# Patient Record
Sex: FEMALE | Race: WHITE | NOT HISPANIC OR LATINO | Employment: OTHER | URBAN - METROPOLITAN AREA
[De-identification: names, ages, dates, MRNs, and addresses within clinical notes are randomized per-mention and may not be internally consistent; named-entity substitution may affect disease eponyms.]

---

## 2018-04-03 ENCOUNTER — TELEPHONE (OUTPATIENT)
Dept: BARIATRICS | Facility: CLINIC | Age: 78
End: 2018-04-03

## 2018-04-05 ENCOUNTER — TELEPHONE (OUTPATIENT)
Dept: BARIATRICS | Facility: CLINIC | Age: 78
End: 2018-04-05

## 2018-04-20 DIAGNOSIS — Z12.39 SCREENING BREAST EXAMINATION: Primary | ICD-10-CM

## 2018-05-17 ENCOUNTER — HOSPITAL ENCOUNTER (OUTPATIENT)
Dept: RADIOLOGY | Facility: HOSPITAL | Age: 78
Discharge: HOME | End: 2018-05-17
Attending: OBSTETRICS & GYNECOLOGY
Payer: MEDICARE

## 2018-05-17 DIAGNOSIS — Z12.39 SCREENING BREAST EXAMINATION: ICD-10-CM

## 2018-05-17 PROCEDURE — 77067 SCR MAMMO BI INCL CAD: CPT | Mod: 26,,, | Performed by: RADIOLOGY

## 2018-05-17 PROCEDURE — 77067 SCR MAMMO BI INCL CAD: CPT | Mod: TC

## 2018-05-29 ENCOUNTER — LAB VISIT (OUTPATIENT)
Dept: LAB | Facility: HOSPITAL | Age: 78
End: 2018-05-29
Attending: INTERNAL MEDICINE
Payer: MEDICARE

## 2018-05-29 DIAGNOSIS — E78.5 HYPERLIPIDEMIA: Primary | ICD-10-CM

## 2018-05-29 DIAGNOSIS — R53.83 FATIGUE: ICD-10-CM

## 2018-05-29 DIAGNOSIS — M25.50 JOINT PAIN: ICD-10-CM

## 2018-05-29 DIAGNOSIS — R73.01 FASTING HYPERGLYCEMIA: ICD-10-CM

## 2018-05-29 DIAGNOSIS — Z79.899 DRUG THERAPY: ICD-10-CM

## 2018-05-29 DIAGNOSIS — E03.9 HYPOTHYROIDISM: ICD-10-CM

## 2018-05-29 LAB
ALBUMIN SERPL BCP-MCNC: 4.3 G/DL
ALP SERPL-CCNC: 55 U/L
ALT SERPL W/O P-5'-P-CCNC: 31 U/L
ANION GAP SERPL CALC-SCNC: 11 MMOL/L
AST SERPL-CCNC: 28 U/L
BASOPHILS # BLD AUTO: 0.02 K/UL
BASOPHILS NFR BLD: 0.4 %
BILIRUB SERPL-MCNC: 0.7 MG/DL
BUN SERPL-MCNC: 19 MG/DL
CALCIUM SERPL-MCNC: 9.5 MG/DL
CHLORIDE SERPL-SCNC: 98 MMOL/L
CHOLEST SERPL-MCNC: 147 MG/DL
CHOLEST/HDLC SERPL: 1.9 {RATIO}
CO2 SERPL-SCNC: 27 MMOL/L
CREAT SERPL-MCNC: 0.9 MG/DL
DIFFERENTIAL METHOD: ABNORMAL
EOSINOPHIL # BLD AUTO: 0.2 K/UL
EOSINOPHIL NFR BLD: 3.9 %
ERYTHROCYTE [DISTWIDTH] IN BLOOD BY AUTOMATED COUNT: 12.1 %
EST. GFR  (AFRICAN AMERICAN): >60 ML/MIN/1.73 M^2
EST. GFR  (NON AFRICAN AMERICAN): >60 ML/MIN/1.73 M^2
ESTIMATED AVG GLUCOSE: 108 MG/DL
GLUCOSE SERPL-MCNC: 106 MG/DL
HBA1C MFR BLD HPLC: 5.4 %
HCT VFR BLD AUTO: 40.4 %
HDLC SERPL-MCNC: 77 MG/DL
HDLC SERPL: 52.4 %
HGB BLD-MCNC: 13.7 G/DL
IMM GRANULOCYTES # BLD AUTO: 0.01 K/UL
IMM GRANULOCYTES NFR BLD AUTO: 0.2 %
LDLC SERPL CALC-MCNC: 56.6 MG/DL
LYMPHOCYTES # BLD AUTO: 1.4 K/UL
LYMPHOCYTES NFR BLD: 29.1 %
MCH RBC QN AUTO: 31.2 PG
MCHC RBC AUTO-ENTMCNC: 33.9 G/DL
MCV RBC AUTO: 92 FL
MONOCYTES # BLD AUTO: 0.4 K/UL
MONOCYTES NFR BLD: 8.6 %
NEUTROPHILS # BLD AUTO: 2.8 K/UL
NEUTROPHILS NFR BLD: 57.8 %
NONHDLC SERPL-MCNC: 70 MG/DL
NRBC BLD-RTO: 0 /100 WBC
PLATELET # BLD AUTO: 207 K/UL
PMV BLD AUTO: 10.9 FL
POTASSIUM SERPL-SCNC: 3.2 MMOL/L
PROT SERPL-MCNC: 6.9 G/DL
RBC # BLD AUTO: 4.39 M/UL
SODIUM SERPL-SCNC: 136 MMOL/L
TRIGL SERPL-MCNC: 67 MG/DL
TSH SERPL DL<=0.005 MIU/L-ACNC: 2.43 UIU/ML
WBC # BLD AUTO: 4.88 K/UL

## 2018-05-29 PROCEDURE — 80061 LIPID PANEL: CPT

## 2018-05-29 PROCEDURE — 80053 COMPREHEN METABOLIC PANEL: CPT

## 2018-05-29 PROCEDURE — 83036 HEMOGLOBIN GLYCOSYLATED A1C: CPT

## 2018-05-29 PROCEDURE — 36415 COLL VENOUS BLD VENIPUNCTURE: CPT

## 2018-05-29 PROCEDURE — 84443 ASSAY THYROID STIM HORMONE: CPT

## 2018-05-29 PROCEDURE — 85025 COMPLETE CBC W/AUTO DIFF WBC: CPT

## 2018-12-03 ENCOUNTER — LAB VISIT (OUTPATIENT)
Dept: LAB | Facility: HOSPITAL | Age: 78
End: 2018-12-03
Attending: INTERNAL MEDICINE
Payer: MEDICARE

## 2018-12-03 DIAGNOSIS — E78.5 HYPERLIPIDEMIA: Primary | ICD-10-CM

## 2018-12-03 LAB
CHOLEST SERPL-MCNC: 189 MG/DL
CHOLEST/HDLC SERPL: 2.3 {RATIO}
HDLC SERPL-MCNC: 83 MG/DL
HDLC SERPL: 43.9 %
LDLC SERPL CALC-MCNC: 77.6 MG/DL
NONHDLC SERPL-MCNC: 106 MG/DL
TRIGL SERPL-MCNC: 142 MG/DL

## 2018-12-03 PROCEDURE — 80061 LIPID PANEL: CPT

## 2018-12-03 PROCEDURE — 36415 COLL VENOUS BLD VENIPUNCTURE: CPT

## 2019-04-15 DIAGNOSIS — Z13.820 SCREENING FOR OSTEOPOROSIS: ICD-10-CM

## 2019-04-15 DIAGNOSIS — Z12.39 SCREENING BREAST EXAMINATION: Primary | ICD-10-CM

## 2019-04-15 DIAGNOSIS — M53.3 SACRAL MASS: Primary | ICD-10-CM

## 2019-04-15 DIAGNOSIS — Z78.0 POSTMENOPAUSAL STATUS: ICD-10-CM

## 2019-05-07 DIAGNOSIS — J43.9 EMPHYSEMA/COPD: ICD-10-CM

## 2019-05-07 DIAGNOSIS — R91.8 LUNG NODULES: Primary | ICD-10-CM

## 2019-05-20 ENCOUNTER — HOSPITAL ENCOUNTER (OUTPATIENT)
Dept: RADIOLOGY | Facility: HOSPITAL | Age: 79
Discharge: HOME | End: 2019-05-20
Attending: OBSTETRICS & GYNECOLOGY
Payer: MEDICARE

## 2019-05-20 VITALS — BODY MASS INDEX: 23.21 KG/M2 | WEIGHT: 131 LBS | HEIGHT: 63 IN

## 2019-05-20 DIAGNOSIS — Z12.39 SCREENING BREAST EXAMINATION: ICD-10-CM

## 2019-05-20 DIAGNOSIS — M53.3 SACRAL MASS: ICD-10-CM

## 2019-05-20 DIAGNOSIS — Z78.0 POSTMENOPAUSAL STATUS: ICD-10-CM

## 2019-05-20 DIAGNOSIS — Z13.820 SCREENING FOR OSTEOPOROSIS: ICD-10-CM

## 2019-05-20 DIAGNOSIS — J43.9 EMPHYSEMA/COPD: ICD-10-CM

## 2019-05-20 PROCEDURE — 25500020 PHARM REV CODE 255: Performed by: OBSTETRICS & GYNECOLOGY

## 2019-05-20 PROCEDURE — 71260 CT THORAX DX C+: CPT | Mod: 26,,, | Performed by: RADIOLOGY

## 2019-05-20 PROCEDURE — 77063 BREAST TOMOSYNTHESIS BI: CPT | Mod: 26,,, | Performed by: RADIOLOGY

## 2019-05-20 PROCEDURE — 72197 MRI PELVIS W/O & W/DYE: CPT | Mod: 26,,, | Performed by: RADIOLOGY

## 2019-05-20 PROCEDURE — 77080 DEXA BONE DENSITY SPINE HIP: ICD-10-PCS | Mod: 26,,, | Performed by: RADIOLOGY

## 2019-05-20 PROCEDURE — 77067 SCR MAMMO BI INCL CAD: CPT | Mod: 26,,, | Performed by: RADIOLOGY

## 2019-05-20 PROCEDURE — 77080 DXA BONE DENSITY AXIAL: CPT | Mod: TC

## 2019-05-20 PROCEDURE — 77067 MAMMO DIGITAL SCREENING BILAT WITH TOMOSYNTHESIS_CAD: ICD-10-PCS | Mod: 26,,, | Performed by: RADIOLOGY

## 2019-05-20 PROCEDURE — 72197 MRI PELVIS W/O & W/DYE: CPT | Mod: TC

## 2019-05-20 PROCEDURE — 77067 SCR MAMMO BI INCL CAD: CPT | Mod: TC

## 2019-05-20 PROCEDURE — A9585 GADOBUTROL INJECTION: HCPCS | Performed by: OBSTETRICS & GYNECOLOGY

## 2019-05-20 PROCEDURE — 72197 MRI PELVIS W WO CONTRAST: ICD-10-PCS | Mod: 26,,, | Performed by: RADIOLOGY

## 2019-05-20 PROCEDURE — 71260 CT THORAX DX C+: CPT | Mod: TC

## 2019-05-20 PROCEDURE — 77063 MAMMO DIGITAL SCREENING BILAT WITH TOMOSYNTHESIS_CAD: ICD-10-PCS | Mod: 26,,, | Performed by: RADIOLOGY

## 2019-05-20 PROCEDURE — 71260 CT CHEST WITH CONTRAST: ICD-10-PCS | Mod: 26,,, | Performed by: RADIOLOGY

## 2019-05-20 PROCEDURE — 77080 DXA BONE DENSITY AXIAL: CPT | Mod: 26,,, | Performed by: RADIOLOGY

## 2019-05-20 RX ORDER — GADOBUTROL 604.72 MG/ML
6 INJECTION INTRAVENOUS
Status: COMPLETED | OUTPATIENT
Start: 2019-05-20 | End: 2019-05-20

## 2019-05-20 RX ADMIN — GADOBUTROL 6 ML: 604.72 INJECTION INTRAVENOUS at 03:05

## 2019-05-20 RX ADMIN — IOHEXOL 75 ML: 350 INJECTION, SOLUTION INTRAVENOUS at 01:05

## 2019-05-22 DIAGNOSIS — E04.1 LEFT THYROID NODULE: Primary | ICD-10-CM

## 2019-05-23 ENCOUNTER — HOSPITAL ENCOUNTER (OUTPATIENT)
Dept: RADIOLOGY | Facility: HOSPITAL | Age: 79
Discharge: HOME | End: 2019-05-23
Attending: OBSTETRICS & GYNECOLOGY
Payer: MEDICARE

## 2019-05-23 DIAGNOSIS — E04.1 LEFT THYROID NODULE: ICD-10-CM

## 2019-05-23 PROCEDURE — 76536 US EXAM OF HEAD AND NECK: CPT | Mod: TC,PN

## 2019-05-23 PROCEDURE — 76536 US THYROID: ICD-10-PCS | Mod: 26,,, | Performed by: RADIOLOGY

## 2019-05-23 PROCEDURE — 76536 US EXAM OF HEAD AND NECK: CPT | Mod: 26,,, | Performed by: RADIOLOGY

## 2019-06-05 ENCOUNTER — TELEPHONE (OUTPATIENT)
Dept: HEMATOLOGY/ONCOLOGY | Facility: CLINIC | Age: 79
End: 2019-06-05

## 2019-06-05 NOTE — TELEPHONE ENCOUNTER
Message left instructing patient to call [phone] to schedule referral appointment on both lines,

## 2019-06-06 ENCOUNTER — TELEPHONE (OUTPATIENT)
Dept: HEMATOLOGY/ONCOLOGY | Facility: CLINIC | Age: 79
End: 2019-06-06

## 2019-06-06 NOTE — TELEPHONE ENCOUNTER
Message left instructing patient to call [phone] to schedule referral appointment for Multiple  Myeloma. (referral in media 6/3)

## 2019-06-12 ENCOUNTER — TELEPHONE (OUTPATIENT)
Dept: INFUSION THERAPY | Facility: HOSPITAL | Age: 79
End: 2019-06-12

## 2019-06-12 ENCOUNTER — TELEPHONE (OUTPATIENT)
Dept: HEMATOLOGY/ONCOLOGY | Facility: CLINIC | Age: 79
End: 2019-06-12

## 2019-06-12 NOTE — TELEPHONE ENCOUNTER
Spoke with patient and she is out of stated until Sunday and will call me Monday to schedule appointment.

## 2019-06-20 ENCOUNTER — LAB VISIT (OUTPATIENT)
Dept: LAB | Facility: HOSPITAL | Age: 79
End: 2019-06-20
Attending: SURGERY
Payer: MEDICARE

## 2019-06-20 ENCOUNTER — TELEPHONE (OUTPATIENT)
Dept: HEMATOLOGY/ONCOLOGY | Facility: CLINIC | Age: 79
End: 2019-06-20

## 2019-06-20 ENCOUNTER — OFFICE VISIT (OUTPATIENT)
Dept: SURGERY | Facility: CLINIC | Age: 79
End: 2019-06-20
Payer: MEDICARE

## 2019-06-20 VITALS
SYSTOLIC BLOOD PRESSURE: 123 MMHG | HEART RATE: 73 BPM | OXYGEN SATURATION: 97 % | HEIGHT: 63 IN | DIASTOLIC BLOOD PRESSURE: 71 MMHG | RESPIRATION RATE: 18 BRPM | TEMPERATURE: 98 F | BODY MASS INDEX: 24.77 KG/M2 | WEIGHT: 139.81 LBS

## 2019-06-20 DIAGNOSIS — E04.2 MULTIPLE THYROID NODULES: Primary | ICD-10-CM

## 2019-06-20 DIAGNOSIS — E04.2 MULTIPLE THYROID NODULES: ICD-10-CM

## 2019-06-20 LAB
T4 FREE SERPL-MCNC: 0.92 NG/DL (ref 0.71–1.51)
TSH SERPL DL<=0.005 MIU/L-ACNC: 1.88 UIU/ML (ref 0.34–5.6)

## 2019-06-20 PROCEDURE — 84481 FREE ASSAY (FT-3): CPT

## 2019-06-20 PROCEDURE — 99205 OFFICE O/P NEW HI 60 MIN: CPT | Mod: S$GLB,,, | Performed by: SURGERY

## 2019-06-20 PROCEDURE — 99205 PR OFFICE/OUTPT VISIT, NEW, LEVL V, 60-74 MIN: ICD-10-PCS | Mod: S$GLB,,, | Performed by: SURGERY

## 2019-06-20 PROCEDURE — 84439 ASSAY OF FREE THYROXINE: CPT

## 2019-06-20 PROCEDURE — 36415 COLL VENOUS BLD VENIPUNCTURE: CPT

## 2019-06-20 PROCEDURE — 84443 ASSAY THYROID STIM HORMONE: CPT

## 2019-06-20 RX ORDER — TRIAMTERENE AND HYDROCHLOROTHIAZIDE 75; 50 MG/1; MG/1
TABLET ORAL
COMMUNITY
Start: 2019-05-24 | End: 2019-06-20 | Stop reason: SDUPTHER

## 2019-06-20 RX ORDER — VALACYCLOVIR HYDROCHLORIDE 1 G/1
TABLET, FILM COATED ORAL
COMMUNITY
Start: 2019-04-22 | End: 2020-04-21

## 2019-06-20 RX ORDER — ROSUVASTATIN CALCIUM 20 MG/1
TABLET, COATED ORAL
COMMUNITY
End: 2021-08-02 | Stop reason: SDUPTHER

## 2019-06-20 RX ORDER — ESOMEPRAZOLE MAGNESIUM 40 MG/1
CAPSULE, DELAYED RELEASE ORAL
COMMUNITY
End: 2020-07-29

## 2019-06-20 NOTE — PROGRESS NOTES
Subjective:       Patient ID: Liliana Hermosillo is a 78 y.o. female.    Chief Complaint: Consult (Referral-Alondra-Thyroid Nodule)      HPI:  Ms. Hermosillo presents today as a consult from Dr. Cantu for an abnormal thyroid ultrasound.  She recently had a CT scan of her chest for surveillance of a pulmonary nodule.  CT scan detected a nodule in the left lobe of her thyroid.  This precipitated a thyroid ultrasound.  Thyroid ultrasound has confirmed to nodules in the left lobe and 1 nodule in the right lobe.  She is not experiencing any symptoms of hyperthyroidism or hypothyroidism.  She is not experiencing any compressive symptoms.    Thyroid ultrasound films and report reviewed from 5/23/2019.  A 2 mm hypoechoic nodule was found in the right lobe of the thyroid.  A 1.6 cm nodule and a 1.3 cm nodule were found in the left lobe.      Allergies & Meds:  Review of patient's allergies indicates:  No Known Allergies    Current Outpatient Medications   Medication Sig Dispense Refill    esomeprazole (NEXIUM) 40 MG capsule Nexium 40 mg capsule,delayed release   Take 0.5 capsules every day by oral route.      rosuvastatin (CRESTOR) 20 MG tablet rosuvastatin 20 mg tablet   Take 1 tablet every day by oral route.      triamterene-hydrochlorothiazide 37.5-25 mg (MAXZIDE-25) 37.5-25 mg per tablet Take 1 tablet by mouth once daily.      valACYclovir (VALTREX) 1000 MG tablet        No current facility-administered medications for this visit.        PMFSHx:  Past Medical History:   Diagnosis Date    Atypical ductal hyperplasia, breast     Edema     Hypertension     Ovarian cyst        Past Surgical History:   Procedure Laterality Date    ANKLE ARTHROSCOPY W/ OPEN REPAIR      BREAST BIOPSY Left     CHOLECYSTECTOMY      HYSTERECTOMY      KNEE SURGERY      OPEN REDUCTION INTERNAL FIXATION-HIP Right 7/2/2014    Performed by [facility]ites [facility]       Family History   Problem Relation Age of Onset    Colon  cancer Mother     Anuerysm Father     Breast cancer Sister     Breast cancer Maternal Aunt     Collagen disease Neg Hx     Ovarian cancer Neg Hx        Social History     Tobacco Use    Smoking status: Never Smoker    Smokeless tobacco: Never Used   Substance Use Topics    Alcohol use: Yes     Alcohol/week: 1.2 oz     Types: 2 Glasses of wine per week    Drug use: No       Review of Systems   Constitutional: Negative for appetite change, chills, fatigue, fever and unexpected weight change.   HENT: Negative for congestion, dental problem, ear pain, mouth sores, postnasal drip, rhinorrhea, sore throat, tinnitus, trouble swallowing and voice change.    Eyes: Negative for photophobia, pain, discharge and visual disturbance.   Respiratory: Negative for cough, chest tightness, shortness of breath and wheezing.    Cardiovascular: Negative for chest pain, palpitations and leg swelling.   Gastrointestinal: Negative for abdominal pain, blood in stool, constipation, diarrhea, nausea and vomiting.   Endocrine: Negative for cold intolerance, heat intolerance, polydipsia, polyphagia and polyuria.   Genitourinary: Negative for difficulty urinating, dysuria, flank pain, frequency, hematuria and urgency.   Musculoskeletal: Negative for arthralgias, joint swelling and myalgias.   Skin: Negative for color change and rash.   Allergic/Immunologic: Negative for immunocompromised state.   Neurological: Negative for dizziness, tremors, seizures, syncope, speech difficulty, weakness, numbness and headaches.   Hematological: Negative for adenopathy. Does not bruise/bleed easily.   Psychiatric/Behavioral: Negative for agitation, confusion, hallucinations, self-injury and suicidal ideas. The patient is not nervous/anxious.        Objective:      Physical Exam   Constitutional: She is oriented to person, place, and time. She appears well-developed and well-nourished. She is active.  Non-toxic appearance. No distress.   Body mass index  is 24.76 kg/m².     HENT:   Head: Normocephalic and atraumatic.   Right Ear: Hearing and external ear normal. No drainage or tenderness.   Left Ear: Hearing and external ear normal. No drainage or tenderness.   Nose: Nose normal. No rhinorrhea. No epistaxis.   Mouth/Throat: Uvula is midline, oropharynx is clear and moist and mucous membranes are normal. Mucous membranes are not pale, not dry and not cyanotic. No oropharyngeal exudate.   Eyes: Pupils are equal, round, and reactive to light. Conjunctivae and lids are normal. Right eye exhibits no discharge and no exudate. Left eye exhibits no discharge and no exudate. Right conjunctiva is not injected. Right eye exhibits no nystagmus. Left eye exhibits no nystagmus.   Neck: Trachea normal, full passive range of motion without pain and phonation normal. No JVD present. Carotid bruit is not present. No tracheal deviation present. No thyroid mass and no thyromegaly present.   Cardiovascular: Normal rate, regular rhythm, S1 normal, S2 normal, normal heart sounds and intact distal pulses. PMI is not displaced. Exam reveals no gallop and no friction rub.   No murmur heard.  Pulmonary/Chest: Effort normal and breath sounds normal. No accessory muscle usage. No respiratory distress. She exhibits no mass, no tenderness and no crepitus. Right breast exhibits no inverted nipple, no mass, no nipple discharge, no skin change and no tenderness. Left breast exhibits no inverted nipple, no mass, no nipple discharge, no skin change and no tenderness. Breasts are symmetrical.   Abdominal: Soft. Normal appearance and bowel sounds are normal. She exhibits no distension, no fluid wave, no abdominal bruit and no mass. There is no hepatosplenomegaly. There is no tenderness. There is no rebound, no guarding and negative Gomes's sign. No hernia.   Musculoskeletal:        Cervical back: Normal.        Thoracic back: Normal.        Lumbar back: Normal.        Right upper arm: Normal.         Left upper arm: Normal.        Right forearm: Normal.        Left forearm: Normal.        Right hand: Normal.        Left hand: Normal.        Right upper leg: Normal.        Left upper leg: Normal.        Right lower leg: Normal.        Left lower leg: Normal.        Right foot: Normal.        Left foot: Normal.   Lymphadenopathy:        Head (right side): No submental, no submandibular and no posterior auricular adenopathy present.        Head (left side): No submental, no submandibular and no posterior auricular adenopathy present.     She has no cervical adenopathy.     She has no axillary adenopathy.        Right: No inguinal and no supraclavicular adenopathy present.        Left: No inguinal and no supraclavicular adenopathy present.   Neurological: She is alert and oriented to person, place, and time. She has normal strength. No cranial nerve deficit or sensory deficit. Coordination and gait normal. GCS eye subscore is 4. GCS verbal subscore is 5. GCS motor subscore is 6.   Skin: Skin is warm, dry and intact. No rash noted. No cyanosis. Nails show no clubbing.   Psychiatric: She has a normal mood and affect. Her speech is normal. Judgment and thought content normal. Her mood appears not anxious. Her affect is not inappropriate. She is not actively hallucinating. She does not exhibit a depressed mood.         Test Results:  See above    Assessment:       Nontoxic multinodular goiter.  Options at this time include were not limited to nuclear medicine thyroid scan, ultrasound-guided FNA biopsy, second opinion, observation with repeat thyroid ultrasound in November, thyroidectomy.  She is due for TFTs.    1. Multiple thyroid nodules        Plan:   Multiple thyroid nodules  -     TSH; Future; Expected date: 06/20/2019  -     T3, free; Future; Expected date: 06/20/2019  -     T4, free; Future; Expected date: 06/20/2019  -     US Soft Tissue Head Neck Thyroid; Future; Expected date: 11/18/2019        Follow up in  about 5 months (around 11/20/2019) for results.    Counseling/Medical Decision Making:  Ms. Hermosillo was counseled regarding the results of her evaluation thus far, differential diagnosis, and therapeutic options.  Diagnoses discussed included not limited to multinodular goiter, adenomas, follicular carcinoma, papillary carcinoma, anaplastic carcinoma, etc.  Options discussed included were not limited to those outlined above. Risks and benefits of surgery were discussed in detail.  Risks discussed included were not limited to recurrent laryngeal nerve injury, incomplete thyroidectomy, parathyroid injury, etc.  Possible complications of observation with repeat ultrasound discussed included not limited to delay in diagnosis of a malignant thyroid tumor, loss of opportunity to care a thyroid malignancy, need for more radical/aggressive surgery and treatment, etc.  Possible risks of FNA biopsy discussed included not limited to pain, bleeding, infections, false-negative biopsy, etc.  Questions were solicited and answered.  Entire conversation was held in layman's terms.  At the conclusion of the conversation she voiced complete understanding of all we discussed, satisfaction all questions were answered, and stated that she felt fully informed completely capable of making an informed decision.  She requested to proceed with observation and repeat thyroid ultrasound in November.    Total face-to-face encounter time was 60 minutes, 40 minutes spent counseling as outlined/summarized above.

## 2019-06-20 NOTE — LETTER
June 20, 2019      Claudio Cantu MD  [address] [facility] [place] [address]           [facility] - [facility]  [address] [place] [address]  Phone: [phone]  Fax: [phone]          Patient: Liliana Hermosillo   MR Number: [id number]   YOB: 1940   Date of Visit: 6/20/2019       Dear Dr. Claudio Cantu:    Thank you for referring Liliana Hermosillo to me for evaluation. Attached you will find relevant portions of my assessment and plan of care.    If you have questions, please do not hesitate to call me. I look forward to following Liliana Hermosillo along with you.    Sincerely,    Maurice Lake MD    Enclosure  CC:  No Recipients    If you would like to receive this communication electronically, please contact [email or web address] or [phone] to request more information on [employer] Link access.    For providers and/or their staff who would like to refer a patient to [facility], please contact us through our one-stop-shop provider referral line, [facility], at [phone].    If you feel you have received this communication in error or would no longer like to receive these types of communications, please e-mail [email or web address]

## 2019-06-21 LAB — T3FREE SERPL-MCNC: 2.4 PG/ML (ref 2.3–4.2)

## 2019-06-24 ENCOUNTER — TELEPHONE (OUTPATIENT)
Dept: HEMATOLOGY/ONCOLOGY | Facility: CLINIC | Age: 79
End: 2019-06-24

## 2019-06-24 NOTE — TELEPHONE ENCOUNTER
----- Message from Tavo PASCUAL Tima sent at 6/24/2019  1:06 PM CDT -----  Contact: same  Patient called in and stated she wants Dr. Tucker to give her a test to see if she has Multiple Myloma.   Patient stated she missed a call last week from office.  Call back number is [phone]

## 2019-06-24 NOTE — TELEPHONE ENCOUNTER
Spoke to pt to schedule referral apt as requested. Pt confirmed apt date and time scheduled and verbalized understanding.

## 2019-07-05 ENCOUNTER — TELEPHONE (OUTPATIENT)
Dept: SURGERY | Facility: CLINIC | Age: 79
End: 2019-07-05

## 2019-07-05 NOTE — TELEPHONE ENCOUNTER
----- Message from Maurice Lake MD sent at 6/27/2019  5:49 PM CDT -----  Contact: [phone]    Please call the patient and tell her that her lab results are normal.  Also please let her know that she can check all her results on the computer through her MyChart    ----- Message -----  From: Ilene Elena LPN  Sent: 6/27/2019   2:52 PM  To: MD Dr Jaya Wolfe  ----- Message -----  From: Roseline Hinojosa  Sent: 6/27/2019  11:38 AM  To: Jaya Richards Staff    Patient is calling for lab results. Please call patient at [phone]. Thanks!

## 2019-08-14 ENCOUNTER — INITIAL CONSULT (OUTPATIENT)
Dept: HEMATOLOGY/ONCOLOGY | Facility: CLINIC | Age: 79
End: 2019-08-14
Payer: MEDICARE

## 2019-08-14 VITALS
WEIGHT: 136 LBS | RESPIRATION RATE: 20 BRPM | OXYGEN SATURATION: 99 % | BODY MASS INDEX: 24.1 KG/M2 | SYSTOLIC BLOOD PRESSURE: 154 MMHG | HEIGHT: 63 IN | HEART RATE: 72 BPM | TEMPERATURE: 98 F | DIASTOLIC BLOOD PRESSURE: 69 MMHG

## 2019-08-14 DIAGNOSIS — Z80.0 FAMILY HISTORY OF COLON CANCER: ICD-10-CM

## 2019-08-14 DIAGNOSIS — Z80.1 FAMILY HISTORY OF LUNG CANCER: ICD-10-CM

## 2019-08-14 DIAGNOSIS — T14.8XXA BROKEN BONES: Primary | ICD-10-CM

## 2019-08-14 PROCEDURE — 99213 OFFICE O/P EST LOW 20 MIN: CPT | Mod: PBBFAC | Performed by: INTERNAL MEDICINE

## 2019-08-14 PROCEDURE — 99999 PR PBB SHADOW E&M-EST. PATIENT-LVL III: ICD-10-PCS | Mod: PBBFAC,,, | Performed by: INTERNAL MEDICINE

## 2019-08-14 PROCEDURE — 99205 PR OFFICE/OUTPT VISIT, NEW, LEVL V, 60-74 MIN: ICD-10-PCS | Mod: S$PBB,,, | Performed by: INTERNAL MEDICINE

## 2019-08-14 PROCEDURE — 99205 OFFICE O/P NEW HI 60 MIN: CPT | Mod: S$PBB,,, | Performed by: INTERNAL MEDICINE

## 2019-08-14 PROCEDURE — 99999 PR PBB SHADOW E&M-EST. PATIENT-LVL III: CPT | Mod: PBBFAC,,, | Performed by: INTERNAL MEDICINE

## 2019-08-14 RX ORDER — TRIAMTERENE AND HYDROCHLOROTHIAZIDE 75; 50 MG/1; MG/1
TABLET ORAL
COMMUNITY
Start: 2019-07-22 | End: 2020-10-19 | Stop reason: SDUPTHER

## 2019-08-14 NOTE — LETTER
August 14, 2019      Claudio Cantu MD  [address] [address] [facility] [place] [address]           [facility] - Hematology Oncology  [address] [place] [address]  Phone: [phone]          Patient: Liliana Hermosillo   MR Number: [id number]   YOB: 1940   Date of Visit: 8/14/2019       Dear Dr. Claudio Cantu:    Thank you for referring Liliana Hermosillo to me for evaluation. Attached you will find relevant portions of my assessment and plan of care.    If you have questions, please do not hesitate to call me. I look forward to following Liliana Hermosillo along with you.    Sincerely,    Neha Tucker MD    Enclosure  CC:  No Recipients    If you would like to receive this communication electronically, please contact [email or web address] or [phone] to request more information on [employer] Link access.    For providers and/or their staff who would like to refer a patient to [facility], please contact us through our one-stop-shop provider referral line, [facility], at [phone].    If you feel you have received this communication in error or would no longer like to receive these types of communications, please e-mail [email or web address]

## 2019-08-14 NOTE — PROGRESS NOTES
CC I had a compression fracture in my back and I want to rule out myeloma    Liliana Hermosillo is a 79 y.o.    This is a 79 yr old female who was found to have a compression fracture in T7. She had a broken ankle as a child , a broken hip and a broken leg in the past. Last dexa was May 20 2019 revealing osteopenia. She is being monitored for a 1.3 cm thyroid nodule. Pt was offered prolia and pagankevin ornelasot want to take this medicine . She is on algae nichole    Past Medical History:   Diagnosis Date    Atypical ductal hyperplasia, breast     Edema     Hypertension     Ovarian cyst      Past Surgical History:   Procedure Laterality Date    ANKLE ARTHROSCOPY W/ OPEN REPAIR      BREAST BIOPSY Left     CHOLECYSTECTOMY      HYSTERECTOMY      KNEE SURGERY      OPEN REDUCTION INTERNAL FIXATION-HIP Right 7/2/2014    Performed by [facility]ites [facility]       Current Outpatient Medications:     esomeprazole (NEXIUM) 40 MG capsule, Nexium 40 mg capsule,delayed release  Take 0.5 capsules every day by oral route., Disp: , Rfl:     rosuvastatin (CRESTOR) 20 MG tablet, rosuvastatin 20 mg tablet  Take 1 tablet every day by oral route., Disp: , Rfl:     triamterene-hydrochlorothiazide 37.5-25 mg (MAXZIDE-25) 37.5-25 mg per tablet, Take 1 tablet by mouth once daily., Disp: , Rfl:     valACYclovir (VALTREX) 1000 MG tablet, , Disp: , Rfl:   Review of patient's allergies indicates:  No Known Allergies  Social History     Tobacco Use    Smoking status: Never Smoker    Smokeless tobacco: Never Used   Substance Use Topics    Alcohol use: Yes     Alcohol/week: 1.2 oz     Types: 2 Glasses of wine per week    Drug use: No     Family History   Problem Relation Age of Onset    Colon cancer Mother     Anuerysm Father     Breast cancer Sister     Breast cancer Maternal Aunt     Collagen disease Neg Hx     Ovarian cancer Neg Hx        CONSTITUTIONAL: No fevers, chills, night sweats, wt. loss, appetite changes  SKIN: no rashes  "or itching  ENT: No headaches, head trauma, vision changes, or eye pain  LYMPH NODES: None noticed   CV: No chest pain, palpitations.   RESP: No dyspnea on exertion, cough, wheezing, or hemoptysis  GI: No nausea, emesis, diarrhea, constipation, melena, hematochezia, pain.   : No dysuria, hematuria, urgency, or frequency   HEME: No easy bruising, bleeding problems  PSYCHIATRIC: No depression, anxiety, psychosis, hallucinations.  NEURO: No seizures, memory loss, dizziness or difficulty sleeping  MSK: No arthralgias or joint swelling         BP (!) 154/69   Pulse 72   Temp 98.1 °F (36.7 °C) (Oral)   Resp 20   Ht 5' 3" (1.6 m)   Wt 61.7 kg (136 lb)   SpO2 99%   BMI 24.09 kg/m²   Gen: NAD, A and O x3,   Psych: pleasant affect, normal thought process  Eyes: Pupils round and non dilated, EOM intact  Nose: Nares patent  OP clear, mucosa patent  Neck: suppple, no JVD, trachea midline, no palpable mass, no adenopathy  Lungs: CTAB, no wheezes, no use of accessory muscles  CV: S1S2 with RRR, No mrg  Abd: soft, NTND, + BS, No HSM, no ascites  Extr: No CCE, DANIELLE, strength normal, good capillary refill  Neuro: steady gait, CNs grossly intact  Skin: intact, no lesions noted  Rheum: No joint swelling    Lab Results   Component Value Date    WBC 4.88 05/29/2018    HGB 13.7 05/29/2018    HCT 40.4 05/29/2018    MCV 92 05/29/2018     05/29/2018     CMP  Sodium   Date Value Ref Range Status   05/29/2018 136 136 - 145 mmol/L Final     Potassium   Date Value Ref Range Status   05/29/2018 3.2 (L) 3.5 - 5.1 mmol/L Final     Chloride   Date Value Ref Range Status   05/29/2018 98 95 - 110 mmol/L Final     CO2   Date Value Ref Range Status   05/29/2018 27 23 - 29 mmol/L Final     Glucose   Date Value Ref Range Status   05/29/2018 106 70 - 110 mg/dL Final     BUN, Bld   Date Value Ref Range Status   05/20/2019 28 (H) 8 - 23 mg/dL Final     Creatinine   Date Value Ref Range Status   05/20/2019 1.1 0.5 - 1.4 mg/dL Final     Calcium "   Date Value Ref Range Status   05/29/2018 9.5 8.7 - 10.5 mg/dL Final     Total Protein   Date Value Ref Range Status   05/29/2018 6.9 6.0 - 8.4 g/dL Final     Albumin   Date Value Ref Range Status   05/29/2018 4.3 3.5 - 5.2 g/dL Final     Total Bilirubin   Date Value Ref Range Status   05/29/2018 0.7 0.1 - 1.0 mg/dL Final     Comment:     For infants and newborns, interpretation of results should be based  on gestational age, weight and in agreement with clinical  observations.  Premature Infant recommended reference ranges:  Up to 24 hours.............<8.0 mg/dL  Up to 48 hours............<12.0 mg/dL  3-5 days..................<15.0 mg/dL  6-29 days.................<15.0 mg/dL       Alkaline Phosphatase   Date Value Ref Range Status   05/29/2018 55 55 - 135 U/L Final     AST   Date Value Ref Range Status   05/29/2018 28 10 - 40 U/L Final     ALT   Date Value Ref Range Status   05/29/2018 31 10 - 44 U/L Final     Anion Gap   Date Value Ref Range Status   05/29/2018 11 8 - 16 mmol/L Final     eGFR if    Date Value Ref Range Status   05/20/2019 56.4 (A) >60 mL/min/1.73 m^2 Final     eGFR if non    Date Value Ref Range Status   05/20/2019 48.9 (A) >60 mL/min/1.73 m^2 Final     Comment:     Calculation used to obtain the estimated glomerular filtration  rate (eGFR) is the CKD-EPI equation.          Broken bones  -     Protein electrophoresis, serum; Future; Expected date: 08/14/2019  -     Immunofixation electrophoresis; Future; Expected date: 08/14/2019  -     Beta 2 Microglobulin, Serum; Future; Expected date: 08/14/2019  -     Lactate dehydrogenase; Future; Expected date: 08/14/2019  -     Immunoglobulins (IgG, IgA, IgM) Quantitative; Future; Expected date: 08/14/2019  -     FREE LT CHAIN ANAL; Future; Expected date: 08/14/2019  -     Viscosity, serum; Future; Expected date: 08/14/2019  -     X-Ray Metastic Survey; Future; Expected date: 08/14/2019  -     X-Ray Skull Complete Min 4  Views; Future; Expected date: 08/14/2019    Family history of colon cancer    Family history of lung cancer      workup in place for myeloma  Start K 2 for bone health   RTC 2 weeks   Thank you for allowing me to evaluate and participate in the care of this pleasant patient. Please fell free to call me with any questions or concerns.    Neha Bhakta MD    This note was dictated with Dragon and briefly proofread. Please excuse any sentences that may be unclear or words misspelled

## 2019-08-15 ENCOUNTER — HOSPITAL ENCOUNTER (OUTPATIENT)
Dept: RADIOLOGY | Facility: HOSPITAL | Age: 79
Discharge: HOME | End: 2019-08-15
Attending: INTERNAL MEDICINE
Payer: MEDICARE

## 2019-08-15 DIAGNOSIS — T14.8XXA BROKEN BONES: ICD-10-CM

## 2019-08-15 PROCEDURE — 77075 XR METASTATIC SURVEY: ICD-10-PCS | Mod: 26,,, | Performed by: RADIOLOGY

## 2019-08-15 PROCEDURE — 77075 RADEX OSSEOUS SURVEY COMPL: CPT | Mod: 26,,, | Performed by: RADIOLOGY

## 2019-08-15 PROCEDURE — 70260 XR SKULL COMPLETE MIN 4 VIEWS: ICD-10-PCS | Mod: 26,59,, | Performed by: RADIOLOGY

## 2019-08-15 PROCEDURE — 77075 RADEX OSSEOUS SURVEY COMPL: CPT | Mod: TC,PN

## 2019-08-15 PROCEDURE — 70260 X-RAY EXAM OF SKULL: CPT | Mod: 26,59,, | Performed by: RADIOLOGY

## 2019-08-15 PROCEDURE — 70260 X-RAY EXAM OF SKULL: CPT | Mod: TC,PN

## 2019-08-23 ENCOUNTER — LAB VISIT (OUTPATIENT)
Dept: LAB | Facility: HOSPITAL | Age: 79
End: 2019-08-23
Attending: INTERNAL MEDICINE
Payer: MEDICARE

## 2019-08-23 DIAGNOSIS — T14.8XXA BROKEN BONES: ICD-10-CM

## 2019-08-23 LAB
IGA SERPL-MCNC: 167 MG/DL (ref 40–350)
IGG SERPL-MCNC: 687 MG/DL (ref 650–1600)
IGM SERPL-MCNC: 72 MG/DL (ref 50–300)
LDH SERPL L TO P-CCNC: 149 U/L (ref 110–260)

## 2019-08-23 PROCEDURE — 85810 BLOOD VISCOSITY EXAMINATION: CPT

## 2019-08-23 PROCEDURE — 84165 PROTEIN E-PHORESIS SERUM: CPT | Mod: 26,,, | Performed by: PATHOLOGY

## 2019-08-23 PROCEDURE — 36415 COLL VENOUS BLD VENIPUNCTURE: CPT

## 2019-08-23 PROCEDURE — 86334 PATHOLOGIST INTERPRETATION IFE: ICD-10-PCS | Mod: 26,,, | Performed by: PATHOLOGY

## 2019-08-23 PROCEDURE — 83520 IMMUNOASSAY QUANT NOS NONAB: CPT

## 2019-08-23 PROCEDURE — 82232 ASSAY OF BETA-2 PROTEIN: CPT

## 2019-08-23 PROCEDURE — 82784 ASSAY IGA/IGD/IGG/IGM EACH: CPT | Mod: 59

## 2019-08-23 PROCEDURE — 84165 PROTEIN E-PHORESIS SERUM: CPT

## 2019-08-23 PROCEDURE — 86334 IMMUNOFIX E-PHORESIS SERUM: CPT

## 2019-08-23 PROCEDURE — 83615 LACTATE (LD) (LDH) ENZYME: CPT

## 2019-08-23 PROCEDURE — 86334 IMMUNOFIX E-PHORESIS SERUM: CPT | Mod: 26,,, | Performed by: PATHOLOGY

## 2019-08-23 PROCEDURE — 84165 PATHOLOGIST INTERPRETATION SPE: ICD-10-PCS | Mod: 26,,, | Performed by: PATHOLOGY

## 2019-08-24 LAB — B2 MICROGLOB SERPL-MCNC: 2.3 UG/ML (ref 0–2.5)

## 2019-08-26 LAB
ALBUMIN SERPL ELPH-MCNC: 4.68 G/DL (ref 3.35–5.55)
ALPHA1 GLOB SERPL ELPH-MCNC: 0.3 G/DL (ref 0.17–0.41)
ALPHA2 GLOB SERPL ELPH-MCNC: 0.72 G/DL (ref 0.43–0.99)
B-GLOBULIN SERPL ELPH-MCNC: 0.8 G/DL (ref 0.5–1.1)
GAMMA GLOB SERPL ELPH-MCNC: 0.7 G/DL (ref 0.67–1.58)
INTERPRETATION SERPL IFE-IMP: NORMAL
KAPPA LC SER QL IA: 1.32 MG/DL (ref 0.33–1.94)
KAPPA LC/LAMBDA SER IA: 1.13 (ref 0.26–1.65)
LAMBDA LC SER QL IA: 1.17 MG/DL (ref 0.57–2.63)
PATHOLOGIST INTERPRETATION IFE: NORMAL
PATHOLOGIST INTERPRETATION SPE: NORMAL
PROT SERPL-MCNC: 7.2 G/DL (ref 6–8.4)

## 2019-08-27 LAB — VISC SER: 1.27 CP (ref 1.1–1.8)

## 2019-08-28 ENCOUNTER — OFFICE VISIT (OUTPATIENT)
Dept: HEMATOLOGY/ONCOLOGY | Facility: CLINIC | Age: 79
End: 2019-08-28
Payer: MEDICARE

## 2019-08-28 VITALS
OXYGEN SATURATION: 98 % | DIASTOLIC BLOOD PRESSURE: 65 MMHG | HEART RATE: 68 BPM | RESPIRATION RATE: 16 BRPM | HEIGHT: 63 IN | SYSTOLIC BLOOD PRESSURE: 136 MMHG | WEIGHT: 135 LBS | BODY MASS INDEX: 23.92 KG/M2

## 2019-08-28 DIAGNOSIS — M48.50XA VERTEBRAL COMPRESSION FRACTURE: ICD-10-CM

## 2019-08-28 DIAGNOSIS — M85.80 OSTEOPENIA, UNSPECIFIED LOCATION: ICD-10-CM

## 2019-08-28 PROCEDURE — 99213 OFFICE O/P EST LOW 20 MIN: CPT | Mod: PBBFAC | Performed by: INTERNAL MEDICINE

## 2019-08-28 PROCEDURE — 99999 PR PBB SHADOW E&M-EST. PATIENT-LVL III: ICD-10-PCS | Mod: PBBFAC,,, | Performed by: INTERNAL MEDICINE

## 2019-08-28 PROCEDURE — 99214 PR OFFICE/OUTPT VISIT, EST, LEVL IV, 30-39 MIN: ICD-10-PCS | Mod: S$PBB,,, | Performed by: INTERNAL MEDICINE

## 2019-08-28 PROCEDURE — 99214 OFFICE O/P EST MOD 30 MIN: CPT | Mod: S$PBB,,, | Performed by: INTERNAL MEDICINE

## 2019-08-28 PROCEDURE — 99999 PR PBB SHADOW E&M-EST. PATIENT-LVL III: CPT | Mod: PBBFAC,,, | Performed by: INTERNAL MEDICINE

## 2019-08-28 NOTE — PROGRESS NOTES
CC I had a compression fracture in my back     Liliana Hermosillo is a 79 y.o.    This is a 79 yr old female who was found to have a compression fracture in T7. She had a broken ankle as a child , a broken hip and a broken leg in the past. Last dexa was May 20 2019 revealing osteopenia. She is being monitored for a 1.3 cm thyroid nodule. Pt was offered prolia and ej ornelasot want to take this medicine . She is on algae nichole    Here for results pertaining to myeloma   Negative workup   Dexa reveal osteopenia   Past Medical History:   Diagnosis Date    Atypical ductal hyperplasia, breast     Edema     Hypertension     Ovarian cyst      Past Surgical History:   Procedure Laterality Date    ANKLE ARTHROSCOPY W/ OPEN REPAIR      BREAST BIOPSY Left     CHOLECYSTECTOMY      HYSTERECTOMY      KNEE SURGERY      OPEN REDUCTION INTERNAL FIXATION-HIP Right 7/2/2014    Performed by Morteza Benites MD at [facility] OR       Current Outpatient Medications:     esomeprazole (NEXIUM) 40 MG capsule, Nexium 40 mg capsule,delayed release  Take 0.5 capsules every day by oral route., Disp: , Rfl:     rosuvastatin (CRESTOR) 20 MG tablet, rosuvastatin 20 mg tablet  Take 1 tablet twicw weekly by oral route., Disp: , Rfl:     triamterene-hydrochlorothiazide 75-50 mg (MAXZIDE) 75-50 mg per tablet, , Disp: , Rfl:     valACYclovir (VALTREX) 1000 MG tablet, , Disp: , Rfl:   Review of patient's allergies indicates:  No Known Allergies  Social History     Tobacco Use    Smoking status: Never Smoker    Smokeless tobacco: Never Used   Substance Use Topics    Alcohol use: Yes     Alcohol/week: 1.2 oz     Types: 2 Glasses of wine per week    Drug use: No     Family History   Problem Relation Age of Onset    Colon cancer Mother     Anuerysm Father     Breast cancer Sister     Breast cancer Maternal Aunt     Collagen disease Neg Hx     Ovarian cancer Neg Hx        CONSTITUTIONAL: No fevers, chills, night sweats, wt. loss, appetite  "changes  SKIN: no rashes or itching  ENT: No headaches, head trauma, vision changes, or eye pain  LYMPH NODES: None noticed   CV: No chest pain, palpitations.   RESP: No dyspnea on exertion, cough, wheezing, or hemoptysis  GI: No nausea, emesis, diarrhea, constipation, melena, hematochezia, pain.   : No dysuria, hematuria, urgency, or frequency   HEME: No easy bruising, bleeding problems  PSYCHIATRIC: No depression,  psychosis, hallucinations.  Positive anxiety  NEURO: No seizures, memory loss, dizziness or difficulty sleeping  MSK: No arthralgias or joint swelling  Asking why she had a fracture in her back and is this normal       /65   Pulse 68   Resp 16   Ht 5' 3" (1.6 m)   Wt 61.2 kg (135 lb)   SpO2 98%   BMI 23.91 kg/m²   Gen: NAD, A and O x3, well groomed conversant  Psych: pleasant affect, normal thought process  Eyes: Pupils round and non dilated, EOM intact  Nose: Nares patent  OP clear, mucosa patent  Neck: suppple, no JVD, trachea midline, no palpable mass, no adenopathy  Lungs:  No fremitus effort is normal  Abd:  Non protuberant  Extr: No CC nonpitting edema, DANIELLE, strength normal, good capillary refill  Neuro: steady gait, CNs grossly intact  Skin: intact, no lesions noted  Rheum: No joint swelling    Lab Results   Component Value Date    WBC 4.88 05/29/2018    HGB 13.7 05/29/2018    HCT 40.4 05/29/2018    MCV 92 05/29/2018     05/29/2018     CMP  Sodium   Date Value Ref Range Status   05/29/2018 136 136 - 145 mmol/L Final     Potassium   Date Value Ref Range Status   05/29/2018 3.2 (L) 3.5 - 5.1 mmol/L Final     Chloride   Date Value Ref Range Status   05/29/2018 98 95 - 110 mmol/L Final     CO2   Date Value Ref Range Status   05/29/2018 27 23 - 29 mmol/L Final     Glucose   Date Value Ref Range Status   05/29/2018 106 70 - 110 mg/dL Final     BUN, Bld   Date Value Ref Range Status   05/20/2019 28 (H) 8 - 23 mg/dL Final     Creatinine   Date Value Ref Range Status   05/20/2019 1.1 " 0.5 - 1.4 mg/dL Final     Calcium   Date Value Ref Range Status   05/29/2018 9.5 8.7 - 10.5 mg/dL Final     Total Protein   Date Value Ref Range Status   05/29/2018 6.9 6.0 - 8.4 g/dL Final     Albumin   Date Value Ref Range Status   05/29/2018 4.3 3.5 - 5.2 g/dL Final     Total Bilirubin   Date Value Ref Range Status   05/29/2018 0.7 0.1 - 1.0 mg/dL Final     Comment:     For infants and newborns, interpretation of results should be based  on gestational age, weight and in agreement with clinical  observations.  Premature Infant recommended reference ranges:  Up to 24 hours.............<8.0 mg/dL  Up to 48 hours............<12.0 mg/dL  3-5 days..................<15.0 mg/dL  6-29 days.................<15.0 mg/dL       Alkaline Phosphatase   Date Value Ref Range Status   05/29/2018 55 55 - 135 U/L Final     AST   Date Value Ref Range Status   05/29/2018 28 10 - 40 U/L Final     ALT   Date Value Ref Range Status   05/29/2018 31 10 - 44 U/L Final     Anion Gap   Date Value Ref Range Status   05/29/2018 11 8 - 16 mmol/L Final     eGFR if    Date Value Ref Range Status   05/20/2019 56.4 (A) >60 mL/min/1.73 m^2 Final     eGFR if non    Date Value Ref Range Status   05/20/2019 48.9 (A) >60 mL/min/1.73 m^2 Final     Comment:     Calculation used to obtain the estimated glomerular filtration  rate (eGFR) is the CKD-EPI equation.          Vertebral compression fracture    Osteopenia, unspecified location      Explain chronic kidney disease and she has better hydration.  Lengthy discussion that she should start some type of bisphosphonate to help prevent further fractures of her bones despite having only osteopenia.  The vertebral fracture likely was not provoked.  She is at high risk for having another fracture.  Lengthy discussion that she has no evidence of myeloma according to her labs her skull x-ray was normal under metastatic survey was normal. She will not consent to receiving Prolia  normal she take weekly oral bisphosphonates.  She has decided to continue with her calcium vitamin D and vitamin K 2.  She does not need to follow up with Heme-Onc at this time thank you for the ability for me to try and take care of this patient        Warmly,  Neha Tucker MD    This note was dictated with Dragon and briefly proofread. Please excuse any sentences that may be unclear or words misspelled

## 2019-09-12 ENCOUNTER — HOSPITAL ENCOUNTER (OUTPATIENT)
Dept: RADIOLOGY | Facility: HOSPITAL | Age: 79
Discharge: HOME | End: 2019-09-12
Attending: UROLOGY
Payer: MEDICARE

## 2019-09-12 DIAGNOSIS — N23 RENAL COLIC: ICD-10-CM

## 2019-09-12 DIAGNOSIS — N23 RENAL COLIC: Primary | ICD-10-CM

## 2019-09-12 PROCEDURE — 76770 US RETROPERITONEAL COMPLETE: ICD-10-PCS | Mod: 26,,, | Performed by: RADIOLOGY

## 2019-09-12 PROCEDURE — 76770 US EXAM ABDO BACK WALL COMP: CPT | Mod: TC,PN

## 2019-09-12 PROCEDURE — 76770 US EXAM ABDO BACK WALL COMP: CPT | Mod: 26,,, | Performed by: RADIOLOGY

## 2019-09-25 DIAGNOSIS — R10.32 ABDOMINAL PAIN, LEFT LOWER QUADRANT: Primary | ICD-10-CM

## 2019-09-26 DIAGNOSIS — R10.32 LLQ PAIN: ICD-10-CM

## 2019-10-01 ENCOUNTER — HOSPITAL ENCOUNTER (OUTPATIENT)
Dept: RADIOLOGY | Facility: HOSPITAL | Age: 79
Discharge: HOME | End: 2019-10-01
Attending: INTERNAL MEDICINE
Payer: MEDICARE

## 2019-10-01 DIAGNOSIS — R10.32 LLQ PAIN: ICD-10-CM

## 2019-10-01 PROCEDURE — 74177 CT ABDOMEN PELVIS WITH CONTRAST: ICD-10-PCS | Mod: 26,,, | Performed by: RADIOLOGY

## 2019-10-01 PROCEDURE — 25500020 PHARM REV CODE 255: Performed by: INTERNAL MEDICINE

## 2019-10-01 PROCEDURE — 74177 CT ABD & PELVIS W/CONTRAST: CPT | Mod: 26,,, | Performed by: RADIOLOGY

## 2019-10-01 PROCEDURE — 74177 CT ABD & PELVIS W/CONTRAST: CPT | Mod: TC

## 2019-10-01 RX ADMIN — IOHEXOL 15 ML: 300 INJECTION, SOLUTION INTRAVENOUS at 08:10

## 2019-10-01 RX ADMIN — IOHEXOL 15 ML: 300 INJECTION, SOLUTION INTRAVENOUS at 07:10

## 2019-10-01 RX ADMIN — IOHEXOL 75 ML: 350 INJECTION, SOLUTION INTRAVENOUS at 09:10

## 2019-10-10 ENCOUNTER — OFFICE VISIT (OUTPATIENT)
Dept: GASTROENTEROLOGY | Facility: CLINIC | Age: 79
End: 2019-10-10
Payer: MEDICARE

## 2019-10-10 VITALS
WEIGHT: 135 LBS | RESPIRATION RATE: 16 BRPM | HEART RATE: 73 BPM | DIASTOLIC BLOOD PRESSURE: 81 MMHG | BODY MASS INDEX: 23.92 KG/M2 | SYSTOLIC BLOOD PRESSURE: 136 MMHG | OXYGEN SATURATION: 98 % | HEIGHT: 63 IN

## 2019-10-10 DIAGNOSIS — K21.9 GASTROESOPHAGEAL REFLUX DISEASE, ESOPHAGITIS PRESENCE NOT SPECIFIED: Primary | ICD-10-CM

## 2019-10-10 DIAGNOSIS — K59.00 CONSTIPATION, UNSPECIFIED CONSTIPATION TYPE: ICD-10-CM

## 2019-10-10 DIAGNOSIS — E78.5 DYSLIPIDEMIA: ICD-10-CM

## 2019-10-10 DIAGNOSIS — Z80.0 FAMILY HISTORY OF COLON CANCER: ICD-10-CM

## 2019-10-10 DIAGNOSIS — R10.9 ABDOMINAL PAIN, UNSPECIFIED ABDOMINAL LOCATION: ICD-10-CM

## 2019-10-10 PROCEDURE — 99203 OFFICE O/P NEW LOW 30 MIN: CPT | Mod: S$PBB,,, | Performed by: INTERNAL MEDICINE

## 2019-10-10 PROCEDURE — 99203 PR OFFICE/OUTPT VISIT, NEW, LEVL III, 30-44 MIN: ICD-10-PCS | Mod: S$PBB,,, | Performed by: INTERNAL MEDICINE

## 2019-10-10 PROCEDURE — 99999 PR PBB SHADOW E&M-EST. PATIENT-LVL III: CPT | Mod: PBBFAC,,, | Performed by: INTERNAL MEDICINE

## 2019-10-10 PROCEDURE — 99999 PR PBB SHADOW E&M-EST. PATIENT-LVL III: ICD-10-PCS | Mod: PBBFAC,,, | Performed by: INTERNAL MEDICINE

## 2019-10-10 PROCEDURE — 99213 OFFICE O/P EST LOW 20 MIN: CPT | Mod: PBBFAC,PN | Performed by: INTERNAL MEDICINE

## 2019-10-10 RX ORDER — POLYETHYLENE GLYCOL 3350 17 G/17G
17 POWDER, FOR SOLUTION ORAL 2 TIMES DAILY
COMMUNITY
End: 2021-06-14

## 2019-10-10 NOTE — PATIENT INSTRUCTIONS
She will start back on her MiraLax for the constipation. She will add fiber and vegetables to the diet.  She will avoid fatty foods.  Will take colon medications as prescribed.  In retrospect is unclear whether the calcium supplement created the problems.  He would be best if she only takes medications that are prescribed.  She will continue her vitamins and minerals.

## 2019-10-10 NOTE — PROGRESS NOTES
"Subjective:       Patient ID: Liliana Hermosillo is a 79 y.o. female.    Chief Complaint: GI Problem    She states about 3 4 weeks ago she started with an over-the-counter calcium algae preparation that she got online.  At about this time she was having some abdominal pain and also took some Percocet.  She had a  evaluation.  She saw her GI who put her on the MiraLax.  She started it 3 times a day and then decreased  the amount.  She stated that the MiraLax started irritating her anus.  She was offered other medications such as the Linzess but states that she did not want take the medications because "I have over he has had difficulty taking medications ".  She remembers when  she was on the Lipitor she had side effects and it was thought that she had a stroke and now she can only take the Crestor twice a week.  She has small caliber bowel movements.  The right lower quadrant discomfort has resolved.  The CT scan showed she had a cholecystectomy and hysterectomy and extensive diverticulosis.  She has a family history of constipation. A brother had lung cancer.  Her mother had colon cancer.  She had a colonoscopy 2 years ago and was told that was negative but "do not have any more colonoscopies because your tissures are so thin you may have a perforation ".  She denies hematemesis hematochezia jaundice or bleeding.  She is having daily bowel movements with minimal straining.  She has been on various diets including the "keto ".  To reduce weight.  She is not eating the leafy vegetables.  She has minimized her vegetables and is ingesting limited fiber.  The abdominal pain has resolved.  She denies hematemesis hematochezia jaundice or bleeding.  The MiraLax did cause some anal irritation which  has resolved.      Allergies:  Review of patient's allergies indicates:  No Known Allergies    Medications:    Current Outpatient Medications:     esomeprazole (NEXIUM) 40 MG capsule, Nexium 40 mg capsule,delayed release  Take " 0.5 capsules every day by oral route., Disp: , Rfl:     phytonadione, vit K1, (MEPHYTON ORAL), vitamin K, Disp: , Rfl:     polyethylene glycol (GLYCOLAX) 17 gram PwPk, Take 17 g by mouth 2 (two) times daily., Disp: , Rfl:     rosuvastatin (CRESTOR) 20 MG tablet, rosuvastatin 20 mg tablet  Take 1 tablet twicw weekly by oral route., Disp: , Rfl:     triamterene-hydrochlorothiazide 75-50 mg (MAXZIDE) 75-50 mg per tablet, , Disp: , Rfl:     valACYclovir (VALTREX) 1000 MG tablet, , Disp: , Rfl:     Past Medical History:   Diagnosis Date    Atypical ductal hyperplasia, breast     Chronic constipation     Diverticulitis     Diverticulosis     Edema     Hypertension     Ovarian cyst        Past Surgical History:   Procedure Laterality Date    ANKLE ARTHROSCOPY W/ OPEN REPAIR      BREAST BIOPSY Left     CHOLECYSTECTOMY      COLONOSCOPY      HYSTERECTOMY      KNEE SURGERY      UPPER GASTROINTESTINAL ENDOSCOPY           Review of Systems   Constitutional: Negative for appetite change, fever and unexpected weight change.   HENT: Negative for trouble swallowing.    Respiratory: Negative for cough, shortness of breath and wheezing.         She is a former smoker.  She denies pulmonary symptoms.  She denies exertional dyspnea chronic cough aspiration or chronic sputum production.   Cardiovascular: Negative for chest pain.        She denies cardiopulmonary symptoms.  She denies exertional chest or rhythm disturbance.  She states her current medicines have resolved her hyperlipidemia and hypertension.   Gastrointestinal: Positive for constipation. Negative for abdominal distention, abdominal pain, anal bleeding, blood in stool, diarrhea and nausea.   Endocrine:        She is followed by the surgeon for the thyroid nodules.   Musculoskeletal: Positive for arthralgias and back pain. Negative for neck pain.   Skin: Negative for pallor and rash.   Neurological: Negative for dizziness, seizures, syncope, speech  difficulty, weakness and numbness.   Hematological: Negative for adenopathy.   Psychiatric/Behavioral: Negative for confusion.       Objective:      Physical Exam   Constitutional: She is oriented to person, place, and time. She appears well-developed and well-nourished.   Well-nourished well-hydrated thin nonicteric white female.   HENT:   Head: Normocephalic.   Eyes: Pupils are equal, round, and reactive to light. EOM are normal.   Neck: Normal range of motion. Neck supple. No tracheal deviation present. No thyromegaly present.   Cardiovascular: Normal rate, regular rhythm and normal heart sounds.   Pulmonary/Chest: Effort normal and breath sounds normal.   Abdominal: Soft. Bowel sounds are normal. She exhibits no distension and no mass. There is no tenderness. There is no guarding.   Musculoskeletal: Normal range of motion.   She can ambulate normally.  She can go from the sitting to the standing position without difficulty.   Lymphadenopathy:     She has no cervical adenopathy.   Neurological: She is alert and oriented to person, place, and time. No cranial nerve deficit.   Skin: Skin is warm and dry.   Psychiatric: She has a normal mood and affect. Her behavior is normal.   Vitals reviewed.        Plan:       Gastroesophageal reflux disease, esophagitis presence not specified  -     CBC auto differential; Future; Expected date: 10/10/2019    Family history of colon cancer  -     CBC auto differential; Future; Expected date: 10/10/2019  -     Comprehensive metabolic panel; Future; Expected date: 10/10/2019  -     Lipid panel; Future; Expected date: 10/10/2019  -     ESR (SEDIMENTATION RATE, MANUAL); Future; Expected date: 10/10/2019    Dyslipidemia  -     Lipid panel; Future; Expected date: 10/10/2019    Constipation, unspecified constipation type  -     Fecal Immunochemical Test (iFOBT); Future; Expected date: 10/10/2019    Abdominal pain, unspecified abdominal location  -     Fecal Immunochemical Test (iFOBT);  Future; Expected date: 10/10/2019     She will regulate the MiraLax produced daily bowel movements.  She will avoid the OTC medications.  She takes on the medications as prescribed.  Labs will be obtained.

## 2019-10-11 ENCOUNTER — LAB VISIT (OUTPATIENT)
Dept: LAB | Facility: HOSPITAL | Age: 79
End: 2019-10-11
Attending: INTERNAL MEDICINE
Payer: MEDICARE

## 2019-10-11 DIAGNOSIS — Z80.0 FAMILY HISTORY OF COLON CANCER: ICD-10-CM

## 2019-10-11 DIAGNOSIS — E78.5 DYSLIPIDEMIA: ICD-10-CM

## 2019-10-11 DIAGNOSIS — K21.9 GASTROESOPHAGEAL REFLUX DISEASE, ESOPHAGITIS PRESENCE NOT SPECIFIED: ICD-10-CM

## 2019-10-11 PROBLEM — K59.00 CONSTIPATION: Status: ACTIVE | Noted: 2019-10-11

## 2019-10-11 PROBLEM — R10.9 ABDOMINAL PAIN: Status: ACTIVE | Noted: 2019-10-11

## 2019-10-11 LAB
ALBUMIN SERPL BCP-MCNC: 4.4 G/DL (ref 3.5–5.2)
ALP SERPL-CCNC: 54 U/L (ref 55–135)
ALT SERPL W/O P-5'-P-CCNC: 24 U/L (ref 10–44)
ANION GAP SERPL CALC-SCNC: 15 MMOL/L (ref 8–16)
AST SERPL-CCNC: 22 U/L (ref 10–40)
BASOPHILS # BLD AUTO: 0.02 K/UL (ref 0–0.2)
BASOPHILS NFR BLD: 0.5 % (ref 0–1.9)
BILIRUB SERPL-MCNC: 0.8 MG/DL (ref 0.1–1)
BUN SERPL-MCNC: 19 MG/DL (ref 8–23)
CALCIUM SERPL-MCNC: 10.1 MG/DL (ref 8.7–10.5)
CHLORIDE SERPL-SCNC: 97 MMOL/L (ref 95–110)
CHOLEST SERPL-MCNC: 200 MG/DL (ref 120–199)
CHOLEST/HDLC SERPL: 2.4 {RATIO} (ref 2–5)
CO2 SERPL-SCNC: 30 MMOL/L (ref 23–29)
CREAT SERPL-MCNC: 1 MG/DL (ref 0.5–1.4)
DIFFERENTIAL METHOD: NORMAL
EOSINOPHIL # BLD AUTO: 0.2 K/UL (ref 0–0.5)
EOSINOPHIL NFR BLD: 3.6 % (ref 0–8)
ERYTHROCYTE [DISTWIDTH] IN BLOOD BY AUTOMATED COUNT: 11.8 % (ref 11.5–14.5)
ERYTHROCYTE [SEDIMENTATION RATE] IN BLOOD BY WESTERGREN METHOD: 20 MM/HR (ref 0–20)
EST. GFR  (AFRICAN AMERICAN): >60 ML/MIN/1.73 M^2
EST. GFR  (NON AFRICAN AMERICAN): 54.5 ML/MIN/1.73 M^2
GLUCOSE SERPL-MCNC: 106 MG/DL (ref 70–110)
HCT VFR BLD AUTO: 40.8 % (ref 37–48.5)
HDLC SERPL-MCNC: 85 MG/DL (ref 40–75)
HDLC SERPL: 42.5 % (ref 20–50)
HGB BLD-MCNC: 13.5 G/DL (ref 12–16)
IMM GRANULOCYTES # BLD AUTO: 0.01 K/UL (ref 0–0.04)
IMM GRANULOCYTES NFR BLD AUTO: 0.2 % (ref 0–0.5)
LDLC SERPL CALC-MCNC: 92 MG/DL (ref 63–159)
LYMPHOCYTES # BLD AUTO: 1.4 K/UL (ref 1–4.8)
LYMPHOCYTES NFR BLD: 32.6 % (ref 18–48)
MCH RBC QN AUTO: 30.5 PG (ref 27–31)
MCHC RBC AUTO-ENTMCNC: 33.1 G/DL (ref 32–36)
MCV RBC AUTO: 92 FL (ref 82–98)
MONOCYTES # BLD AUTO: 0.4 K/UL (ref 0.3–1)
MONOCYTES NFR BLD: 8.4 % (ref 4–15)
NEUTROPHILS # BLD AUTO: 2.4 K/UL (ref 1.8–7.7)
NEUTROPHILS NFR BLD: 54.7 % (ref 38–73)
NONHDLC SERPL-MCNC: 115 MG/DL
NRBC BLD-RTO: 0 /100 WBC
PLATELET # BLD AUTO: 186 K/UL (ref 150–350)
PMV BLD AUTO: 10.8 FL (ref 9.2–12.9)
POTASSIUM SERPL-SCNC: 3.6 MMOL/L (ref 3.5–5.1)
PROT SERPL-MCNC: 7.1 G/DL (ref 6–8.4)
RBC # BLD AUTO: 4.43 M/UL (ref 4–5.4)
SODIUM SERPL-SCNC: 142 MMOL/L (ref 136–145)
TRIGL SERPL-MCNC: 115 MG/DL (ref 30–150)
WBC # BLD AUTO: 4.42 K/UL (ref 3.9–12.7)

## 2019-10-11 PROCEDURE — 85025 COMPLETE CBC W/AUTO DIFF WBC: CPT

## 2019-10-11 PROCEDURE — 36415 COLL VENOUS BLD VENIPUNCTURE: CPT

## 2019-10-11 PROCEDURE — 85651 RBC SED RATE NONAUTOMATED: CPT

## 2019-10-11 PROCEDURE — 80061 LIPID PANEL: CPT

## 2019-10-11 PROCEDURE — 80053 COMPREHEN METABOLIC PANEL: CPT

## 2019-10-15 ENCOUNTER — LAB VISIT (OUTPATIENT)
Dept: LAB | Facility: HOSPITAL | Age: 79
End: 2019-10-15
Attending: INTERNAL MEDICINE
Payer: MEDICARE

## 2019-10-15 DIAGNOSIS — K59.00 CONSTIPATION, UNSPECIFIED CONSTIPATION TYPE: ICD-10-CM

## 2019-10-15 DIAGNOSIS — R10.9 ABDOMINAL PAIN, UNSPECIFIED ABDOMINAL LOCATION: ICD-10-CM

## 2019-10-15 PROCEDURE — 82274 ASSAY TEST FOR BLOOD FECAL: CPT

## 2019-10-22 LAB — HEMOCCULT STL QL IA: NEGATIVE

## 2019-10-31 ENCOUNTER — OFFICE VISIT (OUTPATIENT)
Dept: GASTROENTEROLOGY | Facility: CLINIC | Age: 79
End: 2019-10-31
Payer: MEDICARE

## 2019-10-31 DIAGNOSIS — K59.00 CONSTIPATION, UNSPECIFIED CONSTIPATION TYPE: ICD-10-CM

## 2019-10-31 DIAGNOSIS — E78.5 DYSLIPIDEMIA: ICD-10-CM

## 2019-10-31 DIAGNOSIS — Z80.0 FAMILY HISTORY OF COLON CANCER: Primary | ICD-10-CM

## 2019-10-31 DIAGNOSIS — K57.90 DIVERTICULOSIS: ICD-10-CM

## 2019-10-31 DIAGNOSIS — K21.9 GASTROESOPHAGEAL REFLUX DISEASE, ESOPHAGITIS PRESENCE NOT SPECIFIED: ICD-10-CM

## 2019-10-31 PROCEDURE — 99213 OFFICE O/P EST LOW 20 MIN: CPT | Mod: S$PBB,,, | Performed by: INTERNAL MEDICINE

## 2019-10-31 PROCEDURE — 99212 OFFICE O/P EST SF 10 MIN: CPT | Mod: PBBFAC,PN | Performed by: INTERNAL MEDICINE

## 2019-10-31 PROCEDURE — 99999 PR PBB SHADOW E&M-EST. PATIENT-LVL II: ICD-10-PCS | Mod: PBBFAC,,, | Performed by: INTERNAL MEDICINE

## 2019-10-31 PROCEDURE — 99999 PR PBB SHADOW E&M-EST. PATIENT-LVL II: CPT | Mod: PBBFAC,,, | Performed by: INTERNAL MEDICINE

## 2019-10-31 PROCEDURE — 99213 PR OFFICE/OUTPT VISIT, EST, LEVL III, 20-29 MIN: ICD-10-PCS | Mod: S$PBB,,, | Performed by: INTERNAL MEDICINE

## 2019-10-31 NOTE — PROGRESS NOTES
Subjective:       Patient ID: Liliana Hermosillo is a 79 y.o. female.    Chief Complaint: Follow-up    His she takes the MiraLax in the fiber she has daily bowel movements.  She tried the probiotics but had some abdominal cramping.  She will avoid medications over-the-counter medications that cause side effects.  She is having daily bowel movements.  She denies GI bleeding.  She has a family history of colon cancer and colon polyps.  With her last colonoscopy she was told by her gastroenterologist that her colon mucosa was to thin and never have another it colonoscopy.  The stool studies were negative for blood.  The labs were essentially normal. She denies hematemesis hematochezia jaundice or bleeding.  She has a history of reflux esophagitis and states that she cannot drink coffee or  chocolate in the evenings but can do so in the afternoons.  She will make a food diary and avoid the off ending foods.  She continues her vitamins and minerals she is usually doing noted daily basis.      Allergies:  Review of patient's allergies indicates:  No Known Allergies    Medications:    Current Outpatient Medications:     esomeprazole (NEXIUM) 40 MG capsule, Nexium 40 mg capsule,delayed release  Take 0.5 capsules every day by oral route., Disp: , Rfl:     phytonadione, vit K1, (MEPHYTON ORAL), vitamin K, Disp: , Rfl:     polyethylene glycol (GLYCOLAX) 17 gram PwPk, Take 17 g by mouth 2 (two) times daily., Disp: , Rfl:     rosuvastatin (CRESTOR) 20 MG tablet, rosuvastatin 20 mg tablet  Take 1 tablet twicw weekly by oral route., Disp: , Rfl:     triamterene-hydrochlorothiazide 75-50 mg (MAXZIDE) 75-50 mg per tablet, , Disp: , Rfl:     valACYclovir (VALTREX) 1000 MG tablet, , Disp: , Rfl:     Past Medical History:   Diagnosis Date    Atypical ductal hyperplasia, breast     Chronic constipation     Diverticulitis     Diverticulosis     Edema     Hypertension     Ovarian cyst        Past Surgical History:   Procedure  Laterality Date    ANKLE ARTHROSCOPY W/ OPEN REPAIR      BREAST BIOPSY Left     CHOLECYSTECTOMY      COLONOSCOPY      HYSTERECTOMY      KNEE SURGERY      UPPER GASTROINTESTINAL ENDOSCOPY           Review of Systems   Respiratory:        She has never used tobacco.  She denies cardiopulmonary symptoms.  She denies chronic cough or sputum production.  She denies hemoptysis she denies dyspnea on exertion.   Cardiovascular:        Her hyperlipidemia is well controlled on the Crestor twice week.  She denies exertional chest pain or rhythm disturbances.   Gastrointestinal: Positive for constipation. Negative for abdominal distention, abdominal pain, anal bleeding, blood in stool, diarrhea and nausea.        She has a history of diverticulosis.  Recent CT scan showed diverticulosis and large amount of stool.  She has had a cholecystectomy.  She denies dysphagia hematemesis hematochezia jaundice or bleeding.   Musculoskeletal: Positive for arthralgias.       Objective:      Physical Exam   Constitutional: She is oriented to person, place, and time. She appears well-developed and well-nourished.   Well-nourished well-hydrated thin nonicteric white female   HENT:   Head: Normocephalic.   Eyes: Pupils are equal, round, and reactive to light. EOM are normal.   Neck: Normal range of motion. Neck supple. No tracheal deviation present. No thyromegaly present.   Cardiovascular: Normal rate, regular rhythm and normal heart sounds.   Pulmonary/Chest: Effort normal and breath sounds normal.   Abdominal: Soft. Bowel sounds are normal. She exhibits no distension and no mass. There is no tenderness. There is no rebound and no guarding. No hernia.   Musculoskeletal: Normal range of motion.   She can ambulate normally.  She can go from the sitting to standing position without difficulty.   Lymphadenopathy:     She has no cervical adenopathy.   Neurological: She is alert and oriented to person, place, and time. No cranial nerve  deficit.   Skin: Skin is warm and dry.   Psychiatric: She has a normal mood and affect. Her behavior is normal.   Vitals reviewed.        Plan:       Family history of colon cancer    Gastroesophageal reflux disease, esophagitis presence not specified    Dyslipidemia    Constipation, unspecified constipation type    Diverticulosis     She will continue her reflux regimen current medications.  She continues her bowel program to avoid constipation. She continues her usual activities.  And vitamins and minerals.

## 2019-10-31 NOTE — PATIENT INSTRUCTIONS
She will continue the MiraLax and fiber.  She continues the Crestor twice a week.  Her lipids are well controlled.  The stool was negative for blood.  The other labs were normal.  She continues her vitamins minerals and usual activities.

## 2019-11-18 ENCOUNTER — IMMUNIZATION (OUTPATIENT)
Dept: FAMILY MEDICINE | Facility: CLINIC | Age: 79
End: 2019-11-18
Payer: MEDICARE

## 2019-11-18 ENCOUNTER — HOSPITAL ENCOUNTER (OUTPATIENT)
Dept: RADIOLOGY | Facility: HOSPITAL | Age: 79
Discharge: HOME | End: 2019-11-18
Attending: SURGERY
Payer: MEDICARE

## 2019-11-18 DIAGNOSIS — E04.2 MULTIPLE THYROID NODULES: ICD-10-CM

## 2019-11-18 PROCEDURE — 76536 US EXAM OF HEAD AND NECK: CPT | Mod: 26,,, | Performed by: RADIOLOGY

## 2019-11-18 PROCEDURE — 90662 IIV NO PRSV INCREASED AG IM: CPT | Mod: PBBFAC,PN

## 2019-11-18 PROCEDURE — 76536 US EXAM OF HEAD AND NECK: CPT | Mod: TC,PN

## 2019-11-18 PROCEDURE — 76536 US SOFT TISSUE HEAD NECK THYROID: ICD-10-PCS | Mod: 26,,, | Performed by: RADIOLOGY

## 2019-11-26 ENCOUNTER — OFFICE VISIT (OUTPATIENT)
Dept: SURGERY | Facility: CLINIC | Age: 79
End: 2019-11-26
Payer: MEDICARE

## 2019-11-26 VITALS
RESPIRATION RATE: 11 BRPM | SYSTOLIC BLOOD PRESSURE: 124 MMHG | BODY MASS INDEX: 24.29 KG/M2 | WEIGHT: 137.06 LBS | TEMPERATURE: 98 F | HEART RATE: 72 BPM | HEIGHT: 63 IN | DIASTOLIC BLOOD PRESSURE: 76 MMHG | OXYGEN SATURATION: 97 %

## 2019-11-26 DIAGNOSIS — E04.2 MULTIPLE THYROID NODULES: Primary | ICD-10-CM

## 2019-11-26 PROCEDURE — 1126F AMNT PAIN NOTED NONE PRSNT: CPT | Mod: S$GLB,,, | Performed by: SURGERY

## 2019-11-26 PROCEDURE — 1159F PR MEDICATION LIST DOCUMENTED IN MEDICAL RECORD: ICD-10-PCS | Mod: S$GLB,,, | Performed by: SURGERY

## 2019-11-26 PROCEDURE — 1159F MED LIST DOCD IN RCRD: CPT | Mod: S$GLB,,, | Performed by: SURGERY

## 2019-11-26 PROCEDURE — 99213 OFFICE O/P EST LOW 20 MIN: CPT | Mod: S$GLB,,, | Performed by: SURGERY

## 2019-11-26 PROCEDURE — 1126F PR PAIN SEVERITY QUANTIFIED, NO PAIN PRESENT: ICD-10-PCS | Mod: S$GLB,,, | Performed by: SURGERY

## 2019-11-26 PROCEDURE — 99213 PR OFFICE/OUTPT VISIT, EST, LEVL III, 20-29 MIN: ICD-10-PCS | Mod: S$GLB,,, | Performed by: SURGERY

## 2019-11-26 RX ORDER — OLOPATADINE HYDROCHLORIDE 2 MG/ML
SOLUTION/ DROPS OPHTHALMIC
COMMUNITY
Start: 2019-11-08 | End: 2020-04-21

## 2019-11-26 NOTE — PROGRESS NOTES
Subjective:       Patient ID: Liliana Hermosillo is a 79 y.o. female.    Chief Complaint: Follow-up (Thyroid US 11-18-19)      HPI:  Ms. Hermosillo returns today with no complaints.  She is followed for a multiple nontoxic thyroid nodules.  In the interval since her last visit she had a repeat thyroid ultrasound.  She is not experiencing any signs or symptoms of hyperthyroidism or hypothyroidism.  No compressive symptoms.  TFTs 6/20/2019 indicated she is euthyroid.  Thyroid ultrasound films and report reviewed from 11/18/2019.  Study was compared with images from 5/20 3/19.  Multiple bilateral cystic and solid nodules are present.  The dominant nodule on the right measures 0.4 cm in maximal diameter.  The dominant nodule on the left measures 1.  7 cm in maximal diameter.  None of the nodules me criteria for biopsy.  There has been no significant interval change in comparison with the prior study.      Allergies & Meds:  Review of patient's allergies indicates:  No Known Allergies    Current Outpatient Medications   Medication Sig Dispense Refill    esomeprazole (NEXIUM) 40 MG capsule Nexium 40 mg capsule,delayed release   Take 0.5 capsules every day by oral route.      olopatadine (PATADAY) 0.2 % Drop       rosuvastatin (CRESTOR) 20 MG tablet rosuvastatin 20 mg tablet   Take 1 tablet twicw weekly by oral route.      phytonadione, vit K1, (MEPHYTON ORAL) vitamin K      polyethylene glycol (GLYCOLAX) 17 gram PwPk Take 17 g by mouth 2 (two) times daily.      triamterene-hydrochlorothiazide 75-50 mg (MAXZIDE) 75-50 mg per tablet       valACYclovir (VALTREX) 1000 MG tablet        No current facility-administered medications for this visit.        PMFSHx:    Past Medical History:   Diagnosis Date    Atypical ductal hyperplasia, breast     Chronic constipation     Diverticulitis     Diverticulosis     Edema     Hypertension     Ovarian cyst        Past Surgical History:   Procedure Laterality Date    ANKLE  ARTHROSCOPY W/ OPEN REPAIR      BREAST BIOPSY Left     CHOLECYSTECTOMY      COLONOSCOPY      HYSTERECTOMY      KNEE SURGERY      UPPER GASTROINTESTINAL ENDOSCOPY         Family History   Problem Relation Age of Onset    Colon cancer Mother     Anuerysm Father     Breast cancer Sister     Breast cancer Maternal Aunt     Collagen disease Neg Hx     Ovarian cancer Neg Hx        Social History     Tobacco Use    Smoking status: Never Smoker    Smokeless tobacco: Never Used   Substance Use Topics    Alcohol use: Yes     Alcohol/week: 2.0 standard drinks     Types: 2 Glasses of wine per week    Drug use: No         Review of Systems   Constitutional: Negative for appetite change, chills, fatigue, fever and unexpected weight change.   HENT: Negative for congestion, dental problem, ear pain, mouth sores, postnasal drip, rhinorrhea, sore throat, tinnitus, trouble swallowing and voice change.    Eyes: Negative for photophobia, pain, discharge and visual disturbance.   Respiratory: Negative for cough, chest tightness, shortness of breath and wheezing.    Cardiovascular: Negative for chest pain, palpitations and leg swelling.   Gastrointestinal: Negative for abdominal pain, blood in stool, constipation, diarrhea, nausea and vomiting.   Endocrine: Negative for cold intolerance, heat intolerance, polydipsia, polyphagia and polyuria.   Genitourinary: Negative for difficulty urinating, dysuria, flank pain, frequency, hematuria and urgency.   Musculoskeletal: Negative for arthralgias, joint swelling and myalgias.   Skin: Negative for color change and rash.   Allergic/Immunologic: Negative for immunocompromised state.   Neurological: Negative for dizziness, tremors, seizures, syncope, speech difficulty, weakness, numbness and headaches.   Hematological: Negative for adenopathy. Does not bruise/bleed easily.   Psychiatric/Behavioral: Negative for agitation, confusion, hallucinations, self-injury and suicidal ideas. The  patient is not nervous/anxious.        Objective:      Physical Exam   Constitutional: She appears well-developed and well-nourished.  Non-toxic appearance. She does not appear ill. No distress.   HENT:   Head: Normocephalic and atraumatic.   Right Ear: Hearing and external ear normal.   Left Ear: Hearing and external ear normal.   Nose: Nose normal.   Eyes: Conjunctivae and lids are normal. No scleral icterus.       Neck: Trachea normal, normal range of motion and phonation normal. Neck supple. No JVD present. Carotid bruit is not present. No thyroid mass and no thyromegaly present.   Cardiovascular: Normal rate, regular rhythm and normal heart sounds. PMI is not displaced. Exam reveals no gallop.   No murmur heard.  Pulmonary/Chest: Effort normal and breath sounds normal. No accessory muscle usage. No tachypnea. No respiratory distress.   Abdominal: Soft. Normal appearance and bowel sounds are normal. There is no tenderness. No hernia.   Skin: Skin is warm, dry and intact. No rash noted.         Test Results:      Medical Records Review:      Assessment:       Multiple nontoxic thyroid nodules.  Euthyroid.  Stable thyroid nodules in comparison with prior ultrasound 6 months ago.    1. Multiple thyroid nodules        Plan:   Multiple thyroid nodules  -     US Soft Tissue Head Neck Thyroid; Future; Expected date: 11/01/2020        Follow up in about 1 year (around 11/26/2020).    Counseling/Medical Decision Making:  Options discussed included were not limited to repeat ultrasound in 6 months, repeat ultrasound in 12 months, thyroidectomy, FNA biopsy, second opinion, nuclear medicine thyroid scan with uptake, etc.  Recommendations were expressed for repeat ultrasound in 12 months.  Questions solicited and answered.  Risks and benefits of each option were discussed in detail.  She voiced understanding.  She voiced satisfaction all questions were answered.  She desires to repeat her ultrasound in 1 year.    Total  face-to-face encountered time was 15 minutes, 10 minutes spent counseling the patient as outlined/summarized above.

## 2020-04-14 ENCOUNTER — TELEPHONE (OUTPATIENT)
Dept: SURGERY | Facility: CLINIC | Age: 80
End: 2020-04-14

## 2020-04-14 NOTE — TELEPHONE ENCOUNTER
Writer spoke to patient and let her know that per Dr Lake's last office note, she is to schedule her US thyroid in 11/2020 and f/u 2 weeks later. Patient expressed verbal understanding.

## 2020-04-14 NOTE — TELEPHONE ENCOUNTER
----- Message from Antione Mariee sent at 4/14/2020  9:25 AM CDT -----  Type: Needs Medical Advice  Who Called:  self  Symptoms (please be specific):  NA How long has patient had these symptoms:  [facility]   Best Call Back Number:  [phone] Additional Information: Patient received a reminder to schedule ultrasound of the thyroid. Patient asking for clarification when to schedule the test.

## 2020-06-24 ENCOUNTER — LAB VISIT (OUTPATIENT)
Dept: LAB | Facility: HOSPITAL | Age: 80
End: 2020-06-24
Attending: OTOLARYNGOLOGY
Payer: MEDICARE

## 2020-06-24 DIAGNOSIS — J31.0 CHRONIC RHINITIS: Primary | ICD-10-CM

## 2020-06-24 LAB — IGA SERPL-MCNC: 134 MG/DL (ref 40–350)

## 2020-06-24 PROCEDURE — 82784 ASSAY IGA/IGD/IGG/IGM EACH: CPT

## 2020-06-24 PROCEDURE — 83516 IMMUNOASSAY NONANTIBODY: CPT | Mod: 59

## 2020-06-24 PROCEDURE — 36415 COLL VENOUS BLD VENIPUNCTURE: CPT

## 2020-06-24 PROCEDURE — 86003 ALLG SPEC IGE CRUDE XTRC EA: CPT

## 2020-06-29 LAB
DEPRECATED GLUTEN IGE RAST QL: NORMAL
GLIADIN PEPTIDE IGA SER-ACNC: 3 UNITS
GLIADIN PEPTIDE IGG SER-ACNC: 2 UNITS
GLUTEN IGE QN: <0.1 KU/L
IGA SERPL-MCNC: 135 MG/DL (ref 70–400)
TTG IGA SER-ACNC: 3 UNITS
TTG IGA SER-ACNC: 3 UNITS
TTG IGG SER-ACNC: 3 UNITS

## 2020-07-21 ENCOUNTER — LAB VISIT (OUTPATIENT)
Dept: LAB | Facility: HOSPITAL | Age: 80
End: 2020-07-21
Attending: OBSTETRICS & GYNECOLOGY
Payer: MEDICARE

## 2020-07-21 DIAGNOSIS — Z13.220 SCREENING FOR HYPERLIPIDEMIA: ICD-10-CM

## 2020-07-21 DIAGNOSIS — Z01.419 ENCOUNTER FOR WELL WOMAN EXAM: ICD-10-CM

## 2020-07-21 DIAGNOSIS — I10 ESSENTIAL (PRIMARY) HYPERTENSION: ICD-10-CM

## 2020-07-21 LAB
ALBUMIN SERPL BCP-MCNC: 4.2 G/DL (ref 3.5–5.2)
ALP SERPL-CCNC: 59 U/L (ref 55–135)
ALT SERPL W/O P-5'-P-CCNC: 18 U/L (ref 10–44)
ANION GAP SERPL CALC-SCNC: 9 MMOL/L (ref 8–16)
AST SERPL-CCNC: 19 U/L (ref 10–40)
BASOPHILS # BLD AUTO: 0.03 K/UL (ref 0–0.2)
BASOPHILS NFR BLD: 0.5 % (ref 0–1.9)
BILIRUB SERPL-MCNC: 0.6 MG/DL (ref 0.1–1)
BUN SERPL-MCNC: 25 MG/DL (ref 8–23)
CALCIUM SERPL-MCNC: 9.6 MG/DL (ref 8.7–10.5)
CHLORIDE SERPL-SCNC: 100 MMOL/L (ref 95–110)
CHOLEST SERPL-MCNC: 166 MG/DL (ref 120–199)
CHOLEST/HDLC SERPL: 2 {RATIO} (ref 2–5)
CO2 SERPL-SCNC: 31 MMOL/L (ref 23–29)
CREAT SERPL-MCNC: 0.9 MG/DL (ref 0.5–1.4)
DIFFERENTIAL METHOD: NORMAL
EOSINOPHIL # BLD AUTO: 0.2 K/UL (ref 0–0.5)
EOSINOPHIL NFR BLD: 2.7 % (ref 0–8)
ERYTHROCYTE [DISTWIDTH] IN BLOOD BY AUTOMATED COUNT: 11.9 % (ref 11.5–14.5)
EST. GFR  (AFRICAN AMERICAN): >60 ML/MIN/1.73 M^2
EST. GFR  (NON AFRICAN AMERICAN): >60 ML/MIN/1.73 M^2
GLUCOSE SERPL-MCNC: 105 MG/DL (ref 70–110)
HCT VFR BLD AUTO: 40.6 % (ref 37–48.5)
HDLC SERPL-MCNC: 85 MG/DL (ref 40–75)
HDLC SERPL: 51.2 % (ref 20–50)
HGB BLD-MCNC: 13.2 G/DL (ref 12–16)
IMM GRANULOCYTES # BLD AUTO: 0.02 K/UL (ref 0–0.04)
IMM GRANULOCYTES NFR BLD AUTO: 0.3 % (ref 0–0.5)
LDLC SERPL CALC-MCNC: 55.4 MG/DL (ref 63–159)
LYMPHOCYTES # BLD AUTO: 1.7 K/UL (ref 1–4.8)
LYMPHOCYTES NFR BLD: 26.4 % (ref 18–48)
MCH RBC QN AUTO: 29.5 PG (ref 27–31)
MCHC RBC AUTO-ENTMCNC: 32.5 G/DL (ref 32–36)
MCV RBC AUTO: 91 FL (ref 82–98)
MONOCYTES # BLD AUTO: 0.5 K/UL (ref 0.3–1)
MONOCYTES NFR BLD: 7.9 % (ref 4–15)
NEUTROPHILS # BLD AUTO: 3.9 K/UL (ref 1.8–7.7)
NEUTROPHILS NFR BLD: 62.2 % (ref 38–73)
NONHDLC SERPL-MCNC: 81 MG/DL
NRBC BLD-RTO: 0 /100 WBC
PLATELET # BLD AUTO: 195 K/UL (ref 150–350)
PMV BLD AUTO: 11.3 FL (ref 9.2–12.9)
POTASSIUM SERPL-SCNC: 3.6 MMOL/L (ref 3.5–5.1)
PROT SERPL-MCNC: 6.8 G/DL (ref 6–8.4)
RBC # BLD AUTO: 4.48 M/UL (ref 4–5.4)
SODIUM SERPL-SCNC: 140 MMOL/L (ref 136–145)
TRIGL SERPL-MCNC: 128 MG/DL (ref 30–150)
WBC # BLD AUTO: 6.33 K/UL (ref 3.9–12.7)

## 2020-07-21 PROCEDURE — 80053 COMPREHEN METABOLIC PANEL: CPT

## 2020-07-21 PROCEDURE — 36415 COLL VENOUS BLD VENIPUNCTURE: CPT

## 2020-07-21 PROCEDURE — 85025 COMPLETE CBC W/AUTO DIFF WBC: CPT

## 2020-07-21 PROCEDURE — 80061 LIPID PANEL: CPT

## 2020-07-28 ENCOUNTER — HOSPITAL ENCOUNTER (OUTPATIENT)
Dept: RADIOLOGY | Facility: HOSPITAL | Age: 80
Discharge: HOME | End: 2020-07-28
Attending: OTOLARYNGOLOGY
Payer: MEDICARE

## 2020-07-28 DIAGNOSIS — R42 DIZZINESS AND GIDDINESS: ICD-10-CM

## 2020-07-28 PROCEDURE — 70553 MRI BRAIN STEM W/O & W/DYE: CPT | Mod: TC

## 2020-07-28 PROCEDURE — 70553 MRI BRAIN STEM W/O & W/DYE: CPT | Mod: 26,,, | Performed by: RADIOLOGY

## 2020-07-28 PROCEDURE — 70553 MRI IAC/TEMPORAL BONES W W/O CONTRAST: ICD-10-PCS | Mod: 26,,, | Performed by: RADIOLOGY

## 2020-08-12 DIAGNOSIS — R10.13 EPIGASTRIC PAIN: Primary | ICD-10-CM

## 2020-08-17 ENCOUNTER — HOSPITAL ENCOUNTER (OUTPATIENT)
Dept: RADIOLOGY | Facility: HOSPITAL | Age: 80
Discharge: HOME | End: 2020-08-17
Attending: INTERNAL MEDICINE
Payer: MEDICARE

## 2020-08-17 DIAGNOSIS — R10.13 EPIGASTRIC PAIN: ICD-10-CM

## 2020-08-17 PROCEDURE — 76700 US EXAM ABDOM COMPLETE: CPT | Mod: TC,PN

## 2020-08-17 PROCEDURE — 76700 US EXAM ABDOM COMPLETE: CPT | Mod: 26,,, | Performed by: RADIOLOGY

## 2020-08-17 PROCEDURE — 76700 US ABDOMEN COMPLETE: ICD-10-PCS | Mod: 26,,, | Performed by: RADIOLOGY

## 2020-11-02 ENCOUNTER — HOSPITAL ENCOUNTER (OUTPATIENT)
Dept: RADIOLOGY | Facility: HOSPITAL | Age: 80
Discharge: HOME | End: 2020-11-02
Attending: SURGERY
Payer: MEDICARE

## 2020-11-02 DIAGNOSIS — E04.2 MULTIPLE THYROID NODULES: ICD-10-CM

## 2020-11-02 PROCEDURE — 76536 US EXAM OF HEAD AND NECK: CPT | Mod: 26,,, | Performed by: RADIOLOGY

## 2020-11-02 PROCEDURE — 76536 US EXAM OF HEAD AND NECK: CPT | Mod: TC,PN

## 2020-11-02 PROCEDURE — 76536 US SOFT TISSUE HEAD NECK THYROID: ICD-10-PCS | Mod: 26,,, | Performed by: RADIOLOGY

## 2020-11-11 ENCOUNTER — OFFICE VISIT (OUTPATIENT)
Dept: SURGERY | Facility: CLINIC | Age: 80
End: 2020-11-11
Payer: MEDICARE

## 2020-11-11 VITALS
TEMPERATURE: 98 F | WEIGHT: 139 LBS | HEIGHT: 63 IN | SYSTOLIC BLOOD PRESSURE: 115 MMHG | BODY MASS INDEX: 24.63 KG/M2 | OXYGEN SATURATION: 95 % | DIASTOLIC BLOOD PRESSURE: 76 MMHG | HEART RATE: 76 BPM | RESPIRATION RATE: 16 BRPM

## 2020-11-11 DIAGNOSIS — R91.1 SOLITARY PULMONARY NODULE: ICD-10-CM

## 2020-11-11 DIAGNOSIS — R91.1 LUNG NODULE: ICD-10-CM

## 2020-11-11 DIAGNOSIS — E04.2 MULTIPLE THYROID NODULES: Primary | ICD-10-CM

## 2020-11-11 PROCEDURE — 99215 PR OFFICE/OUTPT VISIT, EST, LEVL V, 40-54 MIN: ICD-10-PCS | Mod: S$GLB,,, | Performed by: SURGERY

## 2020-11-11 PROCEDURE — 99215 OFFICE O/P EST HI 40 MIN: CPT | Mod: S$GLB,,, | Performed by: SURGERY

## 2020-11-11 NOTE — PROGRESS NOTES
Subjective:       Patient ID: Liliana Hermosillo     Chief Complaint:  Follow-up (US Thyroid 11/02/20)      HPI:  Ms. Hermosillo returns today to review results of her thyroid ultrasound.  She is followed for multinodular goiter.  This appointment was incorrectly labeled as a appointment for abdominal pain.  She is not experiencing any symptoms of hyperthyroidism.  She is not experiencing any compressive symptoms.  Recent thyroid ultrasound indicated multiple nodules in both lobes, all nodules were stable in size.  Today she relates a history of an abnormal lung CT with multiple nodules.  She is a former smoker with a 50+ pack-year history.  She is not experiencing any dyspnea, shortness of breath, cough, hemoptysis, weight loss, fevers, chills, night sweats, etc.  She had a CT scan of her chest in the summer of 2017 that revealed several nodules and again in 2018 that showed several nodules stable in size.  These results are reported by Ms. Hermosillo.  The studies were performed in [place], images and reports are currently not available.  She is overdue for a surveillance CT of the chest for multiple nodules.      Allergies & Meds:  Review of patient's allergies indicates:  No Known Allergies    Current Outpatient Medications   Medication Sig Dispense Refill    clobetasoL (TEMOVATE) 0.05 % cream Apply topically every evening. 6-12 weeks 60 g 2    rosuvastatin (CRESTOR) 20 MG tablet rosuvastatin 20 mg tablet   Take 1 tablet twicw weekly by oral route.      triamterene-hydrochlorothiazide 75-50 mg (MAXZIDE) 75-50 mg per tablet Take 1 tablet by mouth once daily. 30 tablet 1    triamterene-hydrochlorothiazide 75-50 mg (MAXZIDE) 75-50 mg per tablet Take 1 tablet by mouth once daily. 90 tablet 3    ipratropium (ATROVENT) 0.03 % nasal spray       pantoprazole (PROTONIX) 40 MG tablet TAKE 1 TABLET IN THE MORNING ON AN EMPTY STOMACH (WAIT 30 MIN BEFORE EATING OR DRINKING) (Patient not taking: Reported on 11/11/2020) 30 tablet  11    phytonadione, vit K1, (MEPHYTON ORAL) vitamin K      polyethylene glycol (GLYCOLAX) 17 gram PwPk Take 17 g by mouth 2 (two) times daily.      zolpidem (AMBIEN) 5 MG Tab Take 1 tablet (5 mg total) by mouth nightly as needed. 30 tablet 0     No current facility-administered medications for this visit.        PMFSHx:  Past Medical History:   Diagnosis Date    Atypical ductal hyperplasia, breast     Chronic constipation     Diverticulitis     Diverticulosis     Edema     Hypertension     Ovarian cyst        Past Surgical History:   Procedure Laterality Date    ANKLE ARTHROSCOPY W/ OPEN REPAIR      BREAST BIOPSY Left     CHOLECYSTECTOMY      COLONOSCOPY      HYSTERECTOMY      KNEE SURGERY      UPPER GASTROINTESTINAL ENDOSCOPY         Family History   Problem Relation Age of Onset    Colon cancer Mother     Anuerysm Father     Breast cancer Sister     Breast cancer Maternal Aunt     Collagen disease Neg Hx     Ovarian cancer Neg Hx        Social History     Tobacco Use    Smoking status: Former Smoker    Smokeless tobacco: Never Used   Substance Use Topics    Alcohol use: Yes     Alcohol/week: 2.0 standard drinks     Types: 2 Glasses of wine per week    Drug use: No       Review of Systems   Constitutional: Negative for appetite change, chills, fatigue, fever and unexpected weight change.   HENT: Negative for congestion, dental problem, ear pain, mouth sores, postnasal drip, rhinorrhea, sore throat, tinnitus, trouble swallowing and voice change.    Eyes: Negative for photophobia, pain, discharge and visual disturbance.   Respiratory: Negative for cough, chest tightness, shortness of breath and wheezing.    Cardiovascular: Negative for chest pain, palpitations and leg swelling.   Gastrointestinal: Negative for abdominal pain, blood in stool, constipation, diarrhea, nausea and vomiting.   Endocrine: Negative for cold intolerance, heat intolerance, polydipsia, polyphagia and polyuria.    Genitourinary: Negative for difficulty urinating, dysuria, flank pain, frequency, hematuria and urgency.   Musculoskeletal: Negative for arthralgias, joint swelling and myalgias.   Skin: Negative for color change and rash.   Allergic/Immunologic: Negative for immunocompromised state.   Neurological: Negative for dizziness, tremors, seizures, syncope, speech difficulty, weakness, numbness and headaches.   Hematological: Negative for adenopathy. Does not bruise/bleed easily.   Psychiatric/Behavioral: Negative for agitation, confusion, hallucinations, self-injury and suicidal ideas. The patient is not nervous/anxious.             Physical Exam  Constitutional:       General: She is not in acute distress.     Appearance: Normal appearance. She is well-developed. She is not toxic-appearing.      Comments: Body mass index is 24.62 kg/m².     HENT:      Head: Normocephalic and atraumatic.      Right Ear: Hearing and external ear normal. No drainage or tenderness.      Left Ear: Hearing and external ear normal. No drainage or tenderness.      Nose: Nose normal. No rhinorrhea.      Mouth/Throat:      Mouth: Mucous membranes are not pale, not dry and not cyanotic.      Pharynx: Uvula midline. No oropharyngeal exudate.   Eyes:      General: Lids are normal.         Right eye: No discharge.         Left eye: No discharge.      Extraocular Movements:      Right eye: No nystagmus.      Left eye: No nystagmus.      Conjunctiva/sclera: Conjunctivae normal.      Right eye: Right conjunctiva is not injected. No exudate.     Left eye: No exudate.     Pupils: Pupils are equal, round, and reactive to light.   Neck:      Musculoskeletal: Full passive range of motion without pain.      Thyroid: No thyroid mass or thyromegaly.      Vascular: No carotid bruit or JVD.      Trachea: Trachea and phonation normal. No tracheal deviation.   Cardiovascular:      Rate and Rhythm: Normal rate and regular rhythm.      Chest Wall: PMI is not  displaced.      Heart sounds: Normal heart sounds, S1 normal and S2 normal. No murmur. No friction rub. No gallop.    Pulmonary:      Effort: Pulmonary effort is normal. No accessory muscle usage or respiratory distress.      Breath sounds: Normal breath sounds.   Chest:      Chest wall: No mass, tenderness or crepitus.      Breasts: Breasts are symmetrical.         Right: No inverted nipple, mass, nipple discharge, skin change or tenderness.         Left: No inverted nipple, mass, nipple discharge, skin change or tenderness.   Abdominal:      General: Bowel sounds are normal. There is no distension or abdominal bruit.      Palpations: Abdomen is soft. There is no fluid wave or mass.      Tenderness: There is no abdominal tenderness. There is no guarding or rebound. Negative signs include Gomes's sign.      Hernia: No hernia is present. There is no hernia in the left inguinal area.   Genitourinary:     Labia:         Right: No rash or lesion.         Left: No rash or lesion.       Vagina: Normal. No vaginal discharge.      Adnexa:         Right: No mass.          Left: No mass.        Rectum: Normal.   Musculoskeletal:      Cervical back: Normal.      Thoracic back: Normal.      Lumbar back: Normal.      Right upper arm: Normal.      Left upper arm: Normal.      Right forearm: Normal.      Left forearm: Normal.      Right hand: Normal.      Left hand: Normal.      Right upper leg: Normal.      Left upper leg: Normal.      Right lower leg: Normal.      Left lower leg: Normal.      Right foot: Normal.      Left foot: Normal.   Lymphadenopathy:      Head:      Right side of head: No submental, submandibular or posterior auricular adenopathy.      Left side of head: No submental, submandibular or posterior auricular adenopathy.      Cervical: No cervical adenopathy.      Upper Body:      Right upper body: No supraclavicular adenopathy.      Left upper body: No supraclavicular adenopathy.   Skin:     General: Skin is  warm and dry.      Findings: No rash.      Nails: There is no clubbing.     Neurological:      Mental Status: She is alert and oriented to person, place, and time.      GCS: GCS eye subscore is 4. GCS verbal subscore is 5. GCS motor subscore is 6.      Cranial Nerves: No cranial nerve deficit.      Sensory: No sensory deficit.      Coordination: Coordination normal.      Gait: Gait normal.   Psychiatric:         Mood and Affect: Mood is not anxious or depressed. Affect is not inappropriate.         Speech: Speech normal.         Thought Content: Thought content normal.         Judgment: Judgment normal.             Medical Records Review:  Thyroid ultrasound films and report reviewed from 11/2/2020.  Three nodules are present in the right lobe, the largest measuring 4 mm.  Two nodules are present in the left lobe.  One nodule measures 1.7 cm and the other measures 1.3 cm.  These nodules are unchanged in comparison with the ultrasound from November of 2019.    Assessment:       Multiple stable thyroid nodules, nontoxic.  Continued surveillance of the thyroid nodules is warranted with ultrasound and TFTs in 1 year.  Lung nodule, currently due for surveillance imaging.    1. Multiple thyroid nodules    2. Lung nodule    3. Solitary pulmonary nodule          Plan:     Multiple thyroid nodules  -     TSH; Future; Expected date: 11/11/2021  -     T3, Free; Future; Expected date: 11/11/2021  -     T4, Free; Future; Expected date: 11/11/2021  -     US Thyroid; Future; Expected date: 11/11/2020    Lung nodule    Solitary pulmonary nodule  -     CT Chest Without Contrast; Future; Expected date: 11/11/2020        Follow up in about 2 weeks (around 11/25/2020) for results.    Counseling/Medical Decision Making:  Ms. Hermosillo was counseled regarding results of her thyroid ultrasound.  She understands she has stable thyroid nodules which likely indicates benign lesions but cannot completely exclude thyroid cancer.  Options at this  time include thyroidectomy, FNA biopsy, continued observation with repeat imaging in 1 year, second opinion.  She was also counseled regarding the abnormal CT and the importance of repeat imaging.  We discussed the differential diagnosis of lung nodules including but not limited to noncalcified granuloma, calcified granuloma, lung cancer, focal consolidation, focal atelectasis, etc.  We also discussed the risk factor of her prior smoking.  Questions solicited and answered.  She voiced understanding.    Total face to face encounter time was 40 minutes, 30 minutes spent counseling as outlined/summarized above.

## 2020-11-17 ENCOUNTER — HOSPITAL ENCOUNTER (OUTPATIENT)
Dept: RADIOLOGY | Facility: HOSPITAL | Age: 80
Discharge: HOME | End: 2020-11-17
Attending: SURGERY
Payer: MEDICARE

## 2020-11-17 DIAGNOSIS — R91.1 SOLITARY PULMONARY NODULE: ICD-10-CM

## 2020-11-17 PROCEDURE — 71250 CT CHEST WITHOUT CONTRAST: ICD-10-PCS | Mod: 26,,, | Performed by: RADIOLOGY

## 2020-11-17 PROCEDURE — 71250 CT THORAX DX C-: CPT | Mod: 26,,, | Performed by: RADIOLOGY

## 2020-11-17 PROCEDURE — 71250 CT THORAX DX C-: CPT | Mod: TC,PN

## 2020-11-23 ENCOUNTER — OFFICE VISIT (OUTPATIENT)
Dept: SURGERY | Facility: CLINIC | Age: 80
End: 2020-11-23
Payer: MEDICARE

## 2020-11-23 VITALS
HEART RATE: 73 BPM | DIASTOLIC BLOOD PRESSURE: 77 MMHG | OXYGEN SATURATION: 97 % | BODY MASS INDEX: 24.27 KG/M2 | SYSTOLIC BLOOD PRESSURE: 130 MMHG | HEIGHT: 63 IN | WEIGHT: 137 LBS | TEMPERATURE: 98 F | RESPIRATION RATE: 14 BRPM

## 2020-11-23 DIAGNOSIS — K21.9 GASTROESOPHAGEAL REFLUX DISEASE WITHOUT ESOPHAGITIS: ICD-10-CM

## 2020-11-23 DIAGNOSIS — E04.2 MULTIPLE THYROID NODULES: ICD-10-CM

## 2020-11-23 DIAGNOSIS — R91.8 MULTIPLE PULMONARY NODULES: Primary | ICD-10-CM

## 2020-11-23 DIAGNOSIS — K44.9 HIATAL HERNIA: ICD-10-CM

## 2020-11-23 PROCEDURE — 99213 PR OFFICE/OUTPT VISIT, EST, LEVL III, 20-29 MIN: ICD-10-PCS | Mod: S$GLB,,, | Performed by: SURGERY

## 2020-11-23 PROCEDURE — 99213 OFFICE O/P EST LOW 20 MIN: CPT | Mod: S$GLB,,, | Performed by: SURGERY

## 2020-11-23 RX ORDER — PANTOPRAZOLE SODIUM 40 MG/1
40 TABLET, DELAYED RELEASE ORAL DAILY
Qty: 90 TABLET | Refills: 3 | Status: SHIPPED | OUTPATIENT
Start: 2020-11-23 | End: 2021-11-29

## 2020-11-23 RX ORDER — LIFITEGRAST 50 MG/ML
SOLUTION/ DROPS OPHTHALMIC
COMMUNITY
Start: 2020-11-20 | End: 2022-11-02

## 2020-11-23 NOTE — LETTER
November 23, 2020      Claudio Cantu MD  [address] [place] [address]           [facility]  [address] [place] [address]  Phone: [phone]  Fax: [phone]          Patient: Liliana Hermosillo   MR Number: [id number]   YOB: 1940   Date of Visit: 11/23/2020       Dear Dr. Claudio Cantu:    Thank you for referring Liliana Hermosillo to me for evaluation. Attached you will find relevant portions of my assessment and plan of care.    If you have questions, please do not hesitate to call me. I look forward to following Liliana Hermosillo along with you.    Sincerely,    Maurice Lake MD    Enclosure  CC:  No Recipients    If you would like to receive this communication electronically, please contact [email or web address] or [phone] to request more information on [employer] Link access.    For providers and/or their staff who would like to refer a patient to [facility], please contact us through our one-stop-shop provider referral line, [facility], at [phone].    If you feel you have received this communication in error or would no longer like to receive these types of communications, please e-mail [email or web address]

## 2020-11-23 NOTE — PROGRESS NOTES
Subjective:       Patient ID: Liliana Hermosillo is a 80 y.o. female.    Chief Complaint: Follow-up (CT 11/17/20)      HPI:  Ms. Hermosillo returns today with no new complaints.  She was last seen on 11/11/2020 for continued management of a multinodular goiter.  During that visit she had no complaints pertaining to the goiter.  That visit was originally labeled as a visit for abdominal pain but at the time of the visit she denied having any abdominal pain.  During that visit she expressed concerns regarding a previous CT of her chest that revealed pulmonary nodules and previous recommendations for a surveillance CT that she had not yet had.  In the interval since her visit she had a surveillance CT scan of her chest that is confirmed the pulmonary nodules are stable an un changing in comparison with a CT from May of 2019.  In the interval since her last visit she did experience epigastric abdominal pain that radiates straight through to her back.  She has a known history of a hiatal hernia and gastroesophageal reflux disease.  She discontinued the Nexium 20 mg that she was taking and resumed Protonix 40 mg that she had on hand at home.  This resolved her symptoms.  She is not experiencing any dysphagia.  No nausea or vomiting.  No melena, hematochezia, hematemesis.  No diarrhea or constipation.  No coughing or choking.  No regurgitation.  No other associated symptoms.  No aggravating or alleviating factors.  No triggering issues.  No association with eating.      Allergies & Meds:  Review of patient's allergies indicates:  No Known Allergies    Current Outpatient Medications   Medication Sig Dispense Refill    clobetasoL (TEMOVATE) 0.05 % cream Apply topically every evening. 6-12 weeks 60 g 2    ipratropium (ATROVENT) 0.03 % nasal spray       pantoprazole (PROTONIX) 40 MG tablet Take 1 tablet (40 mg total) by mouth once daily. 90 tablet 3    phytonadione, vit K1, (MEPHYTON ORAL) vitamin K      polyethylene glycol  (GLYCOLAX) 17 gram PwPk Take 17 g by mouth 2 (two) times daily.      rosuvastatin (CRESTOR) 20 MG tablet rosuvastatin 20 mg tablet   Take 1 tablet twicw weekly by oral route.      triamterene-hydrochlorothiazide 75-50 mg (MAXZIDE) 75-50 mg per tablet Take 1 tablet by mouth once daily. 90 tablet 3    triamterene-hydrochlorothiazide 75-50 mg (MAXZIDE) 75-50 mg per tablet TAKE 1 TABLET BY MOUTH ONCE DAILY.  30 tablet 1    XIIDRA 5 % Dpet       zolpidem (AMBIEN) 5 MG Tab Take 1 tablet (5 mg total) by mouth nightly as needed. 30 tablet 0     No current facility-administered medications for this visit.        PMFSHx:    Past Medical History:   Diagnosis Date    Atypical ductal hyperplasia, breast     Chronic constipation     Diverticulitis     Diverticulosis     Edema     Hypertension     Ovarian cyst        Past Surgical History:   Procedure Laterality Date    ANKLE ARTHROSCOPY W/ OPEN REPAIR      BREAST BIOPSY Left     CHOLECYSTECTOMY      COLONOSCOPY      HYSTERECTOMY      KNEE SURGERY      UPPER GASTROINTESTINAL ENDOSCOPY         Family History   Problem Relation Age of Onset    Colon cancer Mother     Anuerysm Father     Breast cancer Sister     Breast cancer Maternal Aunt     Collagen disease Neg Hx     Ovarian cancer Neg Hx        Social History     Tobacco Use    Smoking status: Former Smoker    Smokeless tobacco: Never Used   Substance Use Topics    Alcohol use: Yes     Alcohol/week: 2.0 standard drinks     Types: 2 Glasses of wine per week    Drug use: No         Review of Systems   Constitutional: Negative for appetite change, chills, fatigue, fever and unexpected weight change.   HENT: Negative for congestion, dental problem, ear pain, mouth sores, postnasal drip, rhinorrhea, sore throat, tinnitus, trouble swallowing and voice change.    Eyes: Negative for photophobia, pain, discharge and visual disturbance.   Respiratory: Negative for cough, chest tightness, shortness of breath  and wheezing.    Cardiovascular: Negative for chest pain, palpitations and leg swelling.   Gastrointestinal: Negative for abdominal distention, anal bleeding, blood in stool, constipation, diarrhea, nausea, rectal pain and vomiting.   Endocrine: Negative for cold intolerance, heat intolerance, polydipsia, polyphagia and polyuria.   Genitourinary: Negative for difficulty urinating, dysuria, flank pain, frequency, hematuria and urgency.   Musculoskeletal: Negative for arthralgias, joint swelling and myalgias.   Skin: Negative for color change and rash.   Allergic/Immunologic: Negative for immunocompromised state.   Neurological: Negative for dizziness, tremors, seizures, syncope, speech difficulty, weakness, numbness and headaches.   Hematological: Negative for adenopathy. Does not bruise/bleed easily.   Psychiatric/Behavioral: Negative for agitation, confusion, hallucinations, self-injury and suicidal ideas. The patient is not nervous/anxious.        Objective:      Physical Exam  Constitutional:       General: She is not in acute distress.     Appearance: Normal appearance. She is well-developed. She is not ill-appearing or toxic-appearing.      Comments: Body mass index is 24.27 kg/m².   HENT:      Head: Normocephalic and atraumatic.      Right Ear: Hearing and external ear normal.      Left Ear: Hearing and external ear normal.      Nose: Nose normal.   Eyes:      General: Lids are normal. No scleral icterus.     Conjunctiva/sclera: Conjunctivae normal.   Neck:      Musculoskeletal: Normal range of motion and neck supple.      Thyroid: No thyroid mass or thyromegaly.      Vascular: No carotid bruit or JVD.      Trachea: Trachea and phonation normal.   Cardiovascular:      Rate and Rhythm: Normal rate and regular rhythm.      Chest Wall: PMI is not displaced.      Heart sounds: Normal heart sounds. No murmur. No gallop.    Pulmonary:      Effort: Pulmonary effort is normal. No tachypnea, accessory muscle usage or  respiratory distress.      Breath sounds: Normal breath sounds.   Abdominal:      General: Bowel sounds are normal.      Palpations: Abdomen is soft.      Tenderness: There is no abdominal tenderness.      Hernia: No hernia is present.   Lymphadenopathy:      Cervical: No cervical adenopathy.      Upper Body:      Right upper body: No supraclavicular adenopathy.      Left upper body: No supraclavicular adenopathy.   Skin:     General: Skin is warm and dry.      Findings: No rash.      Nails: There is no clubbing.     Neurological:      Mental Status: She is alert and oriented to person, place, and time. She is not disoriented.      GCS: GCS eye subscore is 4. GCS verbal subscore is 5. GCS motor subscore is 6.   Psychiatric:         Attention and Perception: She is attentive.         Speech: Speech normal.         Behavior: Behavior normal.         Thought Content: Thought content normal.         Judgment: Judgment normal.           Medical Records Review:   CT scan of the chest films and report were reviewed from 11/17/2020 with the above summarized findings confirmed.      Assessment:        Hiatal hernia and gastroesophageal reflux disease, currently well controlled with Protonix.  No contraindication to continuing Protonix therapy.  Multiple pulmonary nodules, stable on CT.  Repeat CT warranted in 1 year.  Multiple thyroid nodules, stable.  Repeat thyroid ultrasound warranted in 1 year.  Thyroid ultrasound has already been scheduled for the fall of 2021.    1. Multiple pulmonary nodules    2. Hiatal hernia    3. Gastroesophageal reflux disease without esophagitis    4. Multiple thyroid nodules          Plan:     Multiple pulmonary nodules  -     CT Chest Without Contrast; Future; Expected date: 11/01/2021    Hiatal hernia    Gastroesophageal reflux disease without esophagitis    Multiple thyroid nodules    Other orders  -     pantoprazole (PROTONIX) 40 MG tablet; Take 1 tablet (40 mg total) by mouth once daily.   Dispense: 90 tablet; Refill: 3     Continue Protonix.    Follow up in about 1 year (around 11/23/2021) for results. of thyroid ultrasound and chest CT.  RTC sooner if needed.    Counseling/Medical Decision Making:  Repeat thyroid ultrasound is warranted as outlined above.  Repeat CT scan of the chest is warranted as outlined above.  May require repeat EGD if symptoms relapse while taking Protonix.

## 2021-01-07 PROBLEM — J01.11 ACUTE RECURRENT FRONTAL SINUSITIS: Status: ACTIVE | Noted: 2021-01-07

## 2021-01-07 PROBLEM — Z71.89 ADVICE GIVEN ABOUT COVID-19 VIRUS INFECTION: Status: ACTIVE | Noted: 2021-01-07

## 2021-01-12 ENCOUNTER — IMMUNIZATION (OUTPATIENT)
Dept: FAMILY MEDICINE | Facility: CLINIC | Age: 81
End: 2021-01-12
Payer: MEDICARE

## 2021-01-12 DIAGNOSIS — Z23 NEED FOR VACCINATION: ICD-10-CM

## 2021-01-12 PROCEDURE — 0011A COVID-19, MRNA, LNP-S, PF, 100 MCG/0.5 ML DOSE VACCINE: ICD-10-PCS | Mod: CV19,S$GLB,, | Performed by: FAMILY MEDICINE

## 2021-01-12 PROCEDURE — 91301 COVID-19, MRNA, LNP-S, PF, 100 MCG/0.5 ML DOSE VACCINE: CPT | Mod: S$GLB,,, | Performed by: FAMILY MEDICINE

## 2021-01-12 PROCEDURE — 91301 COVID-19, MRNA, LNP-S, PF, 100 MCG/0.5 ML DOSE VACCINE: ICD-10-PCS | Mod: S$GLB,,, | Performed by: FAMILY MEDICINE

## 2021-01-12 PROCEDURE — 0011A COVID-19, MRNA, LNP-S, PF, 100 MCG/0.5 ML DOSE VACCINE: CPT | Mod: CV19,S$GLB,, | Performed by: FAMILY MEDICINE

## 2021-02-03 ENCOUNTER — HOSPITAL ENCOUNTER (EMERGENCY)
Facility: HOSPITAL | Age: 81
Discharge: HOME | End: 2021-02-03
Attending: EMERGENCY MEDICINE
Payer: MEDICARE

## 2021-02-03 VITALS
DIASTOLIC BLOOD PRESSURE: 69 MMHG | TEMPERATURE: 98 F | HEIGHT: 63 IN | OXYGEN SATURATION: 97 % | WEIGHT: 136 LBS | RESPIRATION RATE: 18 BRPM | SYSTOLIC BLOOD PRESSURE: 133 MMHG | BODY MASS INDEX: 24.1 KG/M2 | HEART RATE: 89 BPM

## 2021-02-03 DIAGNOSIS — S06.0X0A CONCUSSION WITHOUT LOSS OF CONSCIOUSNESS, INITIAL ENCOUNTER: Primary | ICD-10-CM

## 2021-02-03 DIAGNOSIS — S09.90XA INJURY OF HEAD, INITIAL ENCOUNTER: ICD-10-CM

## 2021-02-03 DIAGNOSIS — S00.01XA ABRASION OF SCALP, INITIAL ENCOUNTER: ICD-10-CM

## 2021-02-03 PROCEDURE — 63600175 PHARM REV CODE 636 W HCPCS: Performed by: EMERGENCY MEDICINE

## 2021-02-03 PROCEDURE — 96372 THER/PROPH/DIAG INJ SC/IM: CPT

## 2021-02-03 PROCEDURE — 90714 TD VACC NO PRESV 7 YRS+ IM: CPT | Performed by: EMERGENCY MEDICINE

## 2021-02-03 PROCEDURE — 25000003 PHARM REV CODE 250: Performed by: EMERGENCY MEDICINE

## 2021-02-03 PROCEDURE — 70450 CT HEAD WITHOUT CONTRAST: ICD-10-PCS | Mod: 26,,, | Performed by: RADIOLOGY

## 2021-02-03 PROCEDURE — 70450 CT HEAD/BRAIN W/O DYE: CPT | Mod: 26,,, | Performed by: RADIOLOGY

## 2021-02-03 PROCEDURE — 70450 CT HEAD/BRAIN W/O DYE: CPT | Mod: TC

## 2021-02-03 PROCEDURE — 90471 IMMUNIZATION ADMIN: CPT | Performed by: EMERGENCY MEDICINE

## 2021-02-03 PROCEDURE — 99284 EMERGENCY DEPT VISIT MOD MDM: CPT | Mod: 25

## 2021-02-03 RX ORDER — ACETAMINOPHEN 325 MG/1
650 TABLET ORAL
Status: COMPLETED | OUTPATIENT
Start: 2021-02-03 | End: 2021-02-03

## 2021-02-03 RX ADMIN — TETANUS AND DIPHTHERIA TOXOIDS ADSORBED 0.5 ML: 2; 2 INJECTION INTRAMUSCULAR at 05:02

## 2021-02-03 RX ADMIN — ACETAMINOPHEN 650 MG: 325 TABLET ORAL at 05:02

## 2021-02-09 ENCOUNTER — IMMUNIZATION (OUTPATIENT)
Dept: FAMILY MEDICINE | Facility: CLINIC | Age: 81
End: 2021-02-09
Payer: COMMERCIAL

## 2021-02-09 DIAGNOSIS — Z23 NEED FOR VACCINATION: Primary | ICD-10-CM

## 2021-02-09 PROCEDURE — 0012A COVID-19, MRNA, LNP-S, PF, 100 MCG/0.5 ML DOSE VACCINE: ICD-10-PCS | Mod: CV19,S$GLB,, | Performed by: FAMILY MEDICINE

## 2021-02-09 PROCEDURE — 91301 COVID-19, MRNA, LNP-S, PF, 100 MCG/0.5 ML DOSE VACCINE: ICD-10-PCS | Mod: S$GLB,,, | Performed by: FAMILY MEDICINE

## 2021-02-09 PROCEDURE — 91301 COVID-19, MRNA, LNP-S, PF, 100 MCG/0.5 ML DOSE VACCINE: CPT | Mod: S$GLB,,, | Performed by: FAMILY MEDICINE

## 2021-02-09 PROCEDURE — 0012A COVID-19, MRNA, LNP-S, PF, 100 MCG/0.5 ML DOSE VACCINE: CPT | Mod: CV19,S$GLB,, | Performed by: FAMILY MEDICINE

## 2021-02-10 RX ORDER — POTASSIUM CHLORIDE 20 MEQ/1
20 TABLET, EXTENDED RELEASE ORAL 2 TIMES DAILY
Qty: 30 TABLET | Refills: 0 | Status: SHIPPED | OUTPATIENT
Start: 2021-02-10 | End: 2021-02-12

## 2021-02-19 ENCOUNTER — LAB VISIT (OUTPATIENT)
Dept: LAB | Facility: HOSPITAL | Age: 81
End: 2021-02-19
Attending: NURSE PRACTITIONER
Payer: MEDICARE

## 2021-02-19 DIAGNOSIS — Z01.419 WOMEN'S ANNUAL ROUTINE GYNECOLOGICAL EXAMINATION: ICD-10-CM

## 2021-02-19 DIAGNOSIS — E87.6 HYPOKALEMIA: ICD-10-CM

## 2021-02-19 LAB
ALBUMIN SERPL BCP-MCNC: 4.2 G/DL (ref 3.5–5.2)
ALBUMIN SERPL BCP-MCNC: 4.3 G/DL (ref 3.5–5.2)
ALP SERPL-CCNC: 96 U/L (ref 55–135)
ALP SERPL-CCNC: 96 U/L (ref 55–135)
ALT SERPL W/O P-5'-P-CCNC: 23 U/L (ref 10–44)
ALT SERPL W/O P-5'-P-CCNC: 23 U/L (ref 10–44)
ANION GAP SERPL CALC-SCNC: 12 MMOL/L (ref 8–16)
AST SERPL-CCNC: 19 U/L (ref 10–40)
AST SERPL-CCNC: 21 U/L (ref 10–40)
BILIRUB DIRECT SERPL-MCNC: 0.1 MG/DL (ref 0.1–0.3)
BILIRUB SERPL-MCNC: 0.8 MG/DL (ref 0.1–1)
BILIRUB SERPL-MCNC: 0.8 MG/DL (ref 0.1–1)
BUN SERPL-MCNC: 16 MG/DL (ref 8–23)
CALCIUM SERPL-MCNC: 9.8 MG/DL (ref 8.7–10.5)
CHLORIDE SERPL-SCNC: 101 MMOL/L (ref 95–110)
CO2 SERPL-SCNC: 30 MMOL/L (ref 23–29)
CREAT SERPL-MCNC: 1 MG/DL (ref 0.5–1.4)
EST. GFR  (AFRICAN AMERICAN): >60 ML/MIN/1.73 M^2
EST. GFR  (NON AFRICAN AMERICAN): 54.1 ML/MIN/1.73 M^2
GLUCOSE SERPL-MCNC: 96 MG/DL (ref 70–110)
POTASSIUM SERPL-SCNC: 3.6 MMOL/L (ref 3.5–5.1)
PROT SERPL-MCNC: 7.2 G/DL (ref 6–8.4)
PROT SERPL-MCNC: 7.2 G/DL (ref 6–8.4)
SODIUM SERPL-SCNC: 143 MMOL/L (ref 136–145)

## 2021-02-19 PROCEDURE — 36415 COLL VENOUS BLD VENIPUNCTURE: CPT

## 2021-02-19 PROCEDURE — 80053 COMPREHEN METABOLIC PANEL: CPT

## 2021-02-19 PROCEDURE — 80076 HEPATIC FUNCTION PANEL: CPT

## 2021-02-19 PROCEDURE — 82248 BILIRUBIN DIRECT: CPT

## 2021-03-17 ENCOUNTER — LAB VISIT (OUTPATIENT)
Dept: LAB | Facility: HOSPITAL | Age: 81
End: 2021-03-17
Attending: NURSE PRACTITIONER
Payer: MEDICARE

## 2021-03-17 DIAGNOSIS — E87.6 HYPOKALEMIA: ICD-10-CM

## 2021-03-17 LAB
ALBUMIN SERPL BCP-MCNC: 4.7 G/DL (ref 3.5–5.2)
ALP SERPL-CCNC: 59 U/L (ref 55–135)
ALT SERPL W/O P-5'-P-CCNC: 27 U/L (ref 10–44)
ANION GAP SERPL CALC-SCNC: 12 MMOL/L (ref 8–16)
AST SERPL-CCNC: 26 U/L (ref 10–40)
BILIRUB SERPL-MCNC: 1.2 MG/DL (ref 0.1–1)
BUN SERPL-MCNC: 24 MG/DL (ref 8–23)
CALCIUM SERPL-MCNC: 9.8 MG/DL (ref 8.7–10.5)
CHLORIDE SERPL-SCNC: 98 MMOL/L (ref 95–110)
CO2 SERPL-SCNC: 29 MMOL/L (ref 23–29)
CREAT SERPL-MCNC: 1 MG/DL (ref 0.5–1.4)
EST. GFR  (AFRICAN AMERICAN): >60 ML/MIN/1.73 M^2
EST. GFR  (NON AFRICAN AMERICAN): 54.1 ML/MIN/1.73 M^2
GLUCOSE SERPL-MCNC: 105 MG/DL (ref 70–110)
POTASSIUM SERPL-SCNC: 3.7 MMOL/L (ref 3.5–5.1)
PROT SERPL-MCNC: 7.4 G/DL (ref 6–8.4)
SODIUM SERPL-SCNC: 139 MMOL/L (ref 136–145)

## 2021-03-17 PROCEDURE — 80053 COMPREHEN METABOLIC PANEL: CPT | Performed by: NURSE PRACTITIONER

## 2021-03-17 PROCEDURE — 36415 COLL VENOUS BLD VENIPUNCTURE: CPT | Performed by: NURSE PRACTITIONER

## 2021-04-12 PROBLEM — J01.11 ACUTE RECURRENT FRONTAL SINUSITIS: Status: RESOLVED | Noted: 2021-01-07 | Resolved: 2021-04-12

## 2021-05-18 ENCOUNTER — HOSPITAL ENCOUNTER (OUTPATIENT)
Dept: RADIOLOGY | Facility: HOSPITAL | Age: 81
Discharge: HOME | End: 2021-05-18
Attending: NEUROLOGICAL SURGERY
Payer: MEDICARE

## 2021-05-18 DIAGNOSIS — M54.12 RADICULOPATHY, CERVICAL REGION: ICD-10-CM

## 2021-05-18 DIAGNOSIS — M79.2 NEURALGIA AND NEURITIS: ICD-10-CM

## 2021-05-18 PROCEDURE — 72141 MRI NECK SPINE W/O DYE: CPT | Mod: 26,,, | Performed by: RADIOLOGY

## 2021-05-18 PROCEDURE — 70450 CT HEAD/BRAIN W/O DYE: CPT | Mod: 26,,, | Performed by: RADIOLOGY

## 2021-05-18 PROCEDURE — 72141 MRI NECK SPINE W/O DYE: CPT | Mod: TC,PN

## 2021-05-18 PROCEDURE — 70450 CT HEAD/BRAIN W/O DYE: CPT | Mod: TC,PN

## 2021-05-18 PROCEDURE — 70450 CT HEAD WITHOUT CONTRAST: ICD-10-PCS | Mod: 26,,, | Performed by: RADIOLOGY

## 2021-05-18 PROCEDURE — 72141 MRI CERVICAL SPINE WITHOUT CONTRAST: ICD-10-PCS | Mod: 26,,, | Performed by: RADIOLOGY

## 2021-06-07 DIAGNOSIS — M54.2 CERVICALGIA: ICD-10-CM

## 2021-06-07 DIAGNOSIS — M54.12 RADICULOPATHY, CERVICAL: Primary | ICD-10-CM

## 2021-06-11 ENCOUNTER — LAB VISIT (OUTPATIENT)
Dept: LAB | Facility: HOSPITAL | Age: 81
End: 2021-06-11
Attending: NURSE PRACTITIONER
Payer: MEDICARE

## 2021-06-11 DIAGNOSIS — I10 ESSENTIAL (PRIMARY) HYPERTENSION: ICD-10-CM

## 2021-06-11 DIAGNOSIS — E87.6 HYPOKALEMIA: ICD-10-CM

## 2021-06-11 LAB
ALBUMIN SERPL BCP-MCNC: 3.8 G/DL (ref 3.5–5.2)
ALP SERPL-CCNC: 50 U/L (ref 55–135)
ALT SERPL W/O P-5'-P-CCNC: 16 U/L (ref 10–44)
ANION GAP SERPL CALC-SCNC: 9 MMOL/L (ref 8–16)
AST SERPL-CCNC: 18 U/L (ref 10–40)
BILIRUB SERPL-MCNC: 0.6 MG/DL (ref 0.1–1)
BUN SERPL-MCNC: 12 MG/DL (ref 8–23)
CALCIUM SERPL-MCNC: 9.3 MG/DL (ref 8.7–10.5)
CHLORIDE SERPL-SCNC: 107 MMOL/L (ref 95–110)
CO2 SERPL-SCNC: 29 MMOL/L (ref 23–29)
CREAT SERPL-MCNC: 0.7 MG/DL (ref 0.5–1.4)
EST. GFR  (AFRICAN AMERICAN): >60 ML/MIN/1.73 M^2
EST. GFR  (NON AFRICAN AMERICAN): >60 ML/MIN/1.73 M^2
GLUCOSE SERPL-MCNC: 94 MG/DL (ref 70–110)
POTASSIUM SERPL-SCNC: 3.7 MMOL/L (ref 3.5–5.1)
PROT SERPL-MCNC: 6.4 G/DL (ref 6–8.4)
SODIUM SERPL-SCNC: 145 MMOL/L (ref 136–145)

## 2021-06-11 PROCEDURE — 36415 COLL VENOUS BLD VENIPUNCTURE: CPT | Performed by: NURSE PRACTITIONER

## 2021-06-11 PROCEDURE — 80053 COMPREHEN METABOLIC PANEL: CPT | Performed by: NURSE PRACTITIONER

## 2021-06-18 ENCOUNTER — CLINICAL SUPPORT (OUTPATIENT)
Dept: REHABILITATION | Facility: HOSPITAL | Age: 81
End: 2021-06-18
Attending: NEUROLOGICAL SURGERY
Payer: MEDICARE

## 2021-06-18 DIAGNOSIS — M54.12 RADICULOPATHY, CERVICAL: ICD-10-CM

## 2021-06-18 DIAGNOSIS — M54.2 CERVICALGIA: ICD-10-CM

## 2021-06-18 PROCEDURE — 97161 PT EVAL LOW COMPLEX 20 MIN: CPT | Mod: PN

## 2021-06-18 PROCEDURE — 97110 THERAPEUTIC EXERCISES: CPT | Mod: PN

## 2021-06-28 ENCOUNTER — HOSPITAL ENCOUNTER (OUTPATIENT)
Dept: RADIOLOGY | Facility: HOSPITAL | Age: 81
Discharge: HOME | End: 2021-06-28
Attending: OBSTETRICS & GYNECOLOGY
Payer: MEDICARE

## 2021-06-28 ENCOUNTER — CLINICAL SUPPORT (OUTPATIENT)
Dept: REHABILITATION | Facility: HOSPITAL | Age: 81
End: 2021-06-28
Attending: INTERNAL MEDICINE
Payer: MEDICARE

## 2021-06-28 DIAGNOSIS — E04.1 THYROID NODULE: ICD-10-CM

## 2021-06-28 DIAGNOSIS — R91.1 SOLITARY PULMONARY NODULE: ICD-10-CM

## 2021-06-28 DIAGNOSIS — M54.2 NECK PAIN, MUSCULOSKELETAL: ICD-10-CM

## 2021-06-28 DIAGNOSIS — M43.6 STIFFNESS OF CERVICAL SPINE: ICD-10-CM

## 2021-06-28 DIAGNOSIS — R26.81 GAIT INSTABILITY: ICD-10-CM

## 2021-06-28 PROCEDURE — 76536 US EXAM OF HEAD AND NECK: CPT | Mod: TC,PN

## 2021-06-28 PROCEDURE — 76536 US EXAM OF HEAD AND NECK: CPT | Mod: 26,,, | Performed by: RADIOLOGY

## 2021-06-28 PROCEDURE — 97110 THERAPEUTIC EXERCISES: CPT | Mod: PN

## 2021-06-28 PROCEDURE — 76536 US THYROID: ICD-10-PCS | Mod: 26,,, | Performed by: RADIOLOGY

## 2021-06-28 PROCEDURE — 71250 CT THORAX DX C-: CPT | Mod: 26,,, | Performed by: RADIOLOGY

## 2021-06-28 PROCEDURE — 71250 CT THORAX DX C-: CPT | Mod: TC,PN

## 2021-06-28 PROCEDURE — 97140 MANUAL THERAPY 1/> REGIONS: CPT | Mod: PN

## 2021-06-28 PROCEDURE — 71250 CT CHEST WITHOUT CONTRAST: ICD-10-PCS | Mod: 26,,, | Performed by: RADIOLOGY

## 2021-06-30 ENCOUNTER — CLINICAL SUPPORT (OUTPATIENT)
Dept: REHABILITATION | Facility: HOSPITAL | Age: 81
End: 2021-06-30
Attending: NEUROLOGICAL SURGERY
Payer: MEDICARE

## 2021-06-30 DIAGNOSIS — M43.6 STIFFNESS OF CERVICAL SPINE: ICD-10-CM

## 2021-06-30 DIAGNOSIS — M54.2 NECK PAIN, MUSCULOSKELETAL: ICD-10-CM

## 2021-06-30 DIAGNOSIS — R26.81 GAIT INSTABILITY: ICD-10-CM

## 2021-06-30 PROCEDURE — 97140 MANUAL THERAPY 1/> REGIONS: CPT | Mod: PN

## 2021-06-30 PROCEDURE — 97110 THERAPEUTIC EXERCISES: CPT | Mod: PN

## 2021-07-06 ENCOUNTER — CLINICAL SUPPORT (OUTPATIENT)
Dept: REHABILITATION | Facility: HOSPITAL | Age: 81
End: 2021-07-06
Attending: NEUROLOGICAL SURGERY
Payer: MEDICARE

## 2021-07-06 DIAGNOSIS — M54.2 NECK PAIN, MUSCULOSKELETAL: Primary | ICD-10-CM

## 2021-07-06 DIAGNOSIS — R26.81 GAIT INSTABILITY: ICD-10-CM

## 2021-07-06 DIAGNOSIS — M43.6 STIFFNESS OF CERVICAL SPINE: ICD-10-CM

## 2021-07-06 PROCEDURE — 97112 NEUROMUSCULAR REEDUCATION: CPT | Mod: PN,CQ

## 2021-07-06 PROCEDURE — 97140 MANUAL THERAPY 1/> REGIONS: CPT | Mod: PN,CQ

## 2021-07-06 PROCEDURE — 97110 THERAPEUTIC EXERCISES: CPT | Mod: PN,CQ

## 2021-07-13 ENCOUNTER — CLINICAL SUPPORT (OUTPATIENT)
Dept: REHABILITATION | Facility: HOSPITAL | Age: 81
End: 2021-07-13
Attending: NEUROLOGICAL SURGERY
Payer: MEDICARE

## 2021-07-13 DIAGNOSIS — M43.6 STIFFNESS OF CERVICAL SPINE: Primary | ICD-10-CM

## 2021-07-13 DIAGNOSIS — R26.81 GAIT INSTABILITY: ICD-10-CM

## 2021-07-13 DIAGNOSIS — M54.2 NECK PAIN, MUSCULOSKELETAL: ICD-10-CM

## 2021-07-13 PROCEDURE — 97530 THERAPEUTIC ACTIVITIES: CPT | Mod: PN,CQ

## 2021-08-03 DIAGNOSIS — G45.9 TRANSIENT CEREBRAL ISCHEMIC ATTACK, UNSPECIFIED: Primary | ICD-10-CM

## 2021-08-03 DIAGNOSIS — G45.9 TRANSIENT CEREBRAL ISCHEMIC ATTACK: Primary | ICD-10-CM

## 2021-08-03 DIAGNOSIS — G45.9 TRANSIENT CEREBRAL ISCHEMIA, UNSPECIFIED TYPE: Primary | ICD-10-CM

## 2021-08-06 ENCOUNTER — HOSPITAL ENCOUNTER (OUTPATIENT)
Dept: RADIOLOGY | Facility: HOSPITAL | Age: 81
Discharge: HOME | End: 2021-08-06
Attending: NURSE PRACTITIONER
Payer: MEDICARE

## 2021-08-06 DIAGNOSIS — G45.9 TRANSIENT CEREBRAL ISCHEMIA: ICD-10-CM

## 2021-08-06 PROCEDURE — 70544 MR ANGIOGRAPHY HEAD W/O DYE: CPT | Mod: TC,59

## 2021-08-06 PROCEDURE — 70551 MRI BRAIN STEM W/O DYE: CPT | Mod: 26,,, | Performed by: RADIOLOGY

## 2021-08-06 PROCEDURE — 70551 MRI BRAIN WITHOUT CONTRAST: ICD-10-PCS | Mod: 26,,, | Performed by: RADIOLOGY

## 2021-08-06 PROCEDURE — 70544 MR ANGIOGRAPHY HEAD W/O DYE: CPT | Mod: 26,59,, | Performed by: RADIOLOGY

## 2021-08-06 PROCEDURE — 70544 MRA BRAIN WITHOUT CONTRAST: ICD-10-PCS | Mod: 26,59,, | Performed by: RADIOLOGY

## 2021-08-06 PROCEDURE — 70551 MRI BRAIN STEM W/O DYE: CPT | Mod: TC

## 2021-08-09 RX ORDER — AMOXICILLIN 500 MG/1
500 TABLET, FILM COATED ORAL EVERY 12 HOURS
Qty: 20 TABLET | Refills: 0 | Status: SHIPPED | OUTPATIENT
Start: 2021-08-09 | End: 2021-08-19

## 2021-08-10 ENCOUNTER — HOSPITAL ENCOUNTER (OUTPATIENT)
Dept: CARDIOLOGY | Facility: HOSPITAL | Age: 81
Discharge: HOME | End: 2021-08-10
Attending: NURSE PRACTITIONER
Payer: MEDICARE

## 2021-08-10 ENCOUNTER — HOSPITAL ENCOUNTER (OUTPATIENT)
Dept: RADIOLOGY | Facility: HOSPITAL | Age: 81
Discharge: HOME | End: 2021-08-10
Attending: NURSE PRACTITIONER
Payer: MEDICARE

## 2021-08-10 DIAGNOSIS — G45.9 TRANSIENT CEREBRAL ISCHEMIC ATTACK, UNSPECIFIED: ICD-10-CM

## 2021-08-10 DIAGNOSIS — G45.9 TRANSIENT CEREBRAL ISCHEMIA, UNSPECIFIED TYPE: ICD-10-CM

## 2021-08-10 LAB
AORTIC ROOT ANNULUS: 2.85 CM
AORTIC VALVE CUSP SEPERATION: 1.89 CM
AV INDEX (PROSTH): 0.76
AV MEAN GRADIENT: 3 MMHG
AV PEAK GRADIENT: 5 MMHG
AV VALVE AREA: 2.21 CM2
AV VELOCITY RATIO: 0.79
CV ECHO LV RWT: 0.56 CM
DOP CALC AO PEAK VEL: 1.08 M/S
DOP CALC AO VTI: 27.19 CM
DOP CALC LVOT AREA: 2.9 CM2
DOP CALC LVOT DIAMETER: 1.93 CM
DOP CALC LVOT PEAK VEL: 0.85 M/S
DOP CALC LVOT STROKE VOLUME: 60.09 CM3
DOP CALCLVOT PEAK VEL VTI: 20.55 CM
E WAVE DECELERATION TIME: 253.02 MSEC
E/A RATIO: 0.76
E/E' RATIO: 9.86 M/S
ECHO LV POSTERIOR WALL: 0.97 CM (ref 0.6–1.1)
EJECTION FRACTION: 65 %
FRACTIONAL SHORTENING: 35 % (ref 28–44)
INTERVENTRICULAR SEPTUM: 0.95 CM (ref 0.6–1.1)
LA MAJOR: 3.34 CM
LA MINOR: 2.73 CM
LEFT ATRIUM SIZE: 2.62 CM
LEFT ATRIUM VOLUME MOD: 8.29 CM3
LEFT INTERNAL DIMENSION IN SYSTOLE: 2.27 CM (ref 2.1–4)
LEFT VENTRICLE DIASTOLIC VOLUME: 50.39 ML
LEFT VENTRICLE SYSTOLIC VOLUME: 17.54 ML
LEFT VENTRICULAR INTERNAL DIMENSION IN DIASTOLE: 3.49 CM (ref 3.5–6)
LEFT VENTRICULAR MASS: 96.97 G
LV LATERAL E/E' RATIO: 9.86 M/S
LV SEPTAL E/E' RATIO: 9.86 M/S
MV MEAN GRADIENT: 1 MMHG
MV PEAK A VEL: 0.91 M/S
MV PEAK E VEL: 0.69 M/S
MV PEAK GRADIENT: 3 MMHG
MV STENOSIS PRESSURE HALF TIME: 73.38 MS
MV VALVE AREA P 1/2 METHOD: 3 CM2
PISA TR MAX VEL: 1.83 M/S
PV MV: 0.68 M/S
PV PEAK VELOCITY: 0.98 CM/S
RA MAJOR: 4.18 CM
RA PRESSURE: 3 MMHG
RA WIDTH: 2.2 CM
RIGHT VENTRICULAR END-DIASTOLIC DIMENSION: 2.7 CM
TDI LATERAL: 0.07 M/S
TDI SEPTAL: 0.07 M/S
TDI: 0.07 M/S
TR MAX PG: 13 MMHG
TV REST PULMONARY ARTERY PRESSURE: 16 MMHG

## 2021-08-10 PROCEDURE — 93880 EXTRACRANIAL BILAT STUDY: CPT | Mod: TC

## 2021-08-10 PROCEDURE — 93880 EXTRACRANIAL BILAT STUDY: CPT | Mod: 26,,, | Performed by: RADIOLOGY

## 2021-08-10 PROCEDURE — 93356 ECHO (CUPID ONLY): ICD-10-PCS | Mod: ,,, | Performed by: INTERNAL MEDICINE

## 2021-08-10 PROCEDURE — 93356 MYOCRD STRAIN IMG SPCKL TRCK: CPT | Mod: ,,, | Performed by: INTERNAL MEDICINE

## 2021-08-10 PROCEDURE — 93306 ECHO (CUPID ONLY): ICD-10-PCS | Mod: 26,,, | Performed by: INTERNAL MEDICINE

## 2021-08-10 PROCEDURE — 93306 TTE W/DOPPLER COMPLETE: CPT | Mod: 26,,, | Performed by: INTERNAL MEDICINE

## 2021-08-10 PROCEDURE — 93306 TTE W/DOPPLER COMPLETE: CPT

## 2021-08-10 PROCEDURE — 93880 US CAROTID BILATERAL: ICD-10-PCS | Mod: 26,,, | Performed by: RADIOLOGY

## 2021-10-29 ENCOUNTER — TELEPHONE (OUTPATIENT)
Dept: GASTROENTEROLOGY | Facility: CLINIC | Age: 81
End: 2021-10-29
Payer: MEDICARE

## 2021-11-08 ENCOUNTER — HOSPITAL ENCOUNTER (OUTPATIENT)
Dept: RADIOLOGY | Facility: HOSPITAL | Age: 81
Discharge: HOME | End: 2021-11-08
Attending: SURGERY
Payer: MEDICARE

## 2021-11-08 ENCOUNTER — HOSPITAL ENCOUNTER (OUTPATIENT)
Dept: RADIOLOGY | Facility: HOSPITAL | Age: 81
Discharge: HOME | End: 2021-11-08
Attending: OBSTETRICS & GYNECOLOGY
Payer: MEDICARE

## 2021-11-08 VITALS — BODY MASS INDEX: 25.16 KG/M2 | HEIGHT: 63 IN | WEIGHT: 142 LBS

## 2021-11-08 DIAGNOSIS — M81.0 AGE-RELATED OSTEOPOROSIS WITHOUT CURRENT PATHOLOGICAL FRACTURE: ICD-10-CM

## 2021-11-08 DIAGNOSIS — R91.8 MULTIPLE PULMONARY NODULES: ICD-10-CM

## 2021-11-08 DIAGNOSIS — Z13.820 SCREENING FOR OSTEOPOROSIS: ICD-10-CM

## 2021-11-08 DIAGNOSIS — Z12.31 SCREENING MAMMOGRAM, ENCOUNTER FOR: ICD-10-CM

## 2021-11-08 PROCEDURE — 77080 DXA BONE DENSITY AXIAL: CPT | Mod: TC

## 2021-11-08 PROCEDURE — 77063 MAMMO DIGITAL SCREENING BILAT WITH TOMO: ICD-10-PCS | Mod: 26,,, | Performed by: RADIOLOGY

## 2021-11-08 PROCEDURE — 77067 SCR MAMMO BI INCL CAD: CPT | Mod: 26,,, | Performed by: RADIOLOGY

## 2021-11-08 PROCEDURE — 77063 BREAST TOMOSYNTHESIS BI: CPT | Mod: 26,,, | Performed by: RADIOLOGY

## 2021-11-08 PROCEDURE — 71250 CT THORAX DX C-: CPT | Mod: 26,,, | Performed by: RADIOLOGY

## 2021-11-08 PROCEDURE — 71250 CT THORAX DX C-: CPT | Mod: TC

## 2021-11-08 PROCEDURE — 77067 SCR MAMMO BI INCL CAD: CPT | Mod: TC

## 2021-11-08 PROCEDURE — 77080 DXA BONE DENSITY AXIAL: CPT | Mod: 26,,, | Performed by: RADIOLOGY

## 2021-11-08 PROCEDURE — 77080 DEXA BONE DENSITY SPINE HIP: ICD-10-PCS | Mod: 26,,, | Performed by: RADIOLOGY

## 2021-11-08 PROCEDURE — 71250 CT CHEST WITHOUT CONTRAST: ICD-10-PCS | Mod: 26,,, | Performed by: RADIOLOGY

## 2021-11-08 PROCEDURE — 77067 MAMMO DIGITAL SCREENING BILAT WITH TOMO: ICD-10-PCS | Mod: 26,,, | Performed by: RADIOLOGY

## 2021-11-29 ENCOUNTER — OFFICE VISIT (OUTPATIENT)
Dept: FAMILY MEDICINE | Facility: CLINIC | Age: 81
End: 2021-11-29
Payer: MEDICARE

## 2021-11-29 ENCOUNTER — LAB VISIT (OUTPATIENT)
Dept: LAB | Facility: HOSPITAL | Age: 81
End: 2021-11-29
Attending: OBSTETRICS & GYNECOLOGY
Payer: MEDICARE

## 2021-11-29 VITALS
DIASTOLIC BLOOD PRESSURE: 72 MMHG | OXYGEN SATURATION: 98 % | WEIGHT: 142.88 LBS | BODY MASS INDEX: 25.32 KG/M2 | RESPIRATION RATE: 15 BRPM | HEIGHT: 63 IN | HEART RATE: 70 BPM | SYSTOLIC BLOOD PRESSURE: 131 MMHG

## 2021-11-29 DIAGNOSIS — I10 PRIMARY HYPERTENSION: Primary | ICD-10-CM

## 2021-11-29 DIAGNOSIS — M85.80 OSTEOPENIA, UNSPECIFIED LOCATION: ICD-10-CM

## 2021-11-29 DIAGNOSIS — Z23 NEED FOR VACCINATION: ICD-10-CM

## 2021-11-29 DIAGNOSIS — R00.2 PALPITATION: ICD-10-CM

## 2021-11-29 PROCEDURE — 99999 PR PBB SHADOW E&M-EST. PATIENT-LVL IV: CPT | Mod: PBBFAC,,, | Performed by: FAMILY MEDICINE

## 2021-11-29 PROCEDURE — G0008 ADMIN INFLUENZA VIRUS VAC: HCPCS | Mod: PBBFAC,PN

## 2021-11-29 PROCEDURE — 99999 PR PBB SHADOW E&M-EST. PATIENT-LVL IV: ICD-10-PCS | Mod: PBBFAC,,, | Performed by: FAMILY MEDICINE

## 2021-11-29 PROCEDURE — 90694 VACC AIIV4 NO PRSRV 0.5ML IM: CPT | Mod: PBBFAC,PN

## 2021-11-29 PROCEDURE — 99214 OFFICE O/P EST MOD 30 MIN: CPT | Mod: PBBFAC,PN,25 | Performed by: FAMILY MEDICINE

## 2021-11-29 PROCEDURE — 99204 OFFICE O/P NEW MOD 45 MIN: CPT | Mod: S$PBB,,, | Performed by: FAMILY MEDICINE

## 2021-11-29 PROCEDURE — 36415 COLL VENOUS BLD VENIPUNCTURE: CPT | Performed by: OBSTETRICS & GYNECOLOGY

## 2021-11-29 PROCEDURE — 99204 PR OFFICE/OUTPT VISIT, NEW, LEVL IV, 45-59 MIN: ICD-10-PCS | Mod: S$PBB,,, | Performed by: FAMILY MEDICINE

## 2021-11-29 PROCEDURE — 82306 VITAMIN D 25 HYDROXY: CPT | Performed by: OBSTETRICS & GYNECOLOGY

## 2021-11-29 RX ORDER — HYDROGEN PEROXIDE 3 %
20 SOLUTION, NON-ORAL MISCELLANEOUS
COMMUNITY
End: 2022-11-02

## 2021-11-29 RX ORDER — CYANOCOBALAMIN (VITAMIN B-12) 500 MCG
TABLET ORAL NIGHTLY PRN
COMMUNITY

## 2021-11-29 RX ORDER — TRIAMTERENE AND HYDROCHLOROTHIAZIDE 75; 50 MG/1; MG/1
0.5 TABLET ORAL DAILY
COMMUNITY
End: 2022-08-18 | Stop reason: DRUGHIGH

## 2021-11-29 RX ORDER — MULTIVITAMIN
1 TABLET ORAL DAILY
COMMUNITY

## 2021-11-30 LAB — 25(OH)D3+25(OH)D2 SERPL-MCNC: 37 NG/ML (ref 30–96)

## 2021-11-30 RX ORDER — ERGOCALCIFEROL 1.25 MG/1
50000 CAPSULE ORAL
Qty: 12 CAPSULE | Refills: 0 | Status: SHIPPED | OUTPATIENT
Start: 2021-11-30 | End: 2022-02-16

## 2021-12-02 DIAGNOSIS — Z11.59 NEED FOR HEPATITIS C SCREENING TEST: ICD-10-CM

## 2022-01-04 ENCOUNTER — LAB VISIT (OUTPATIENT)
Dept: FAMILY MEDICINE | Facility: CLINIC | Age: 82
End: 2022-01-04
Payer: MEDICARE

## 2022-01-04 DIAGNOSIS — Z11.52 ENCOUNTER FOR SCREENING FOR COVID-19: Primary | ICD-10-CM

## 2022-01-04 PROCEDURE — U0003 INFECTIOUS AGENT DETECTION BY NUCLEIC ACID (DNA OR RNA); SEVERE ACUTE RESPIRATORY SYNDROME CORONAVIRUS 2 (SARS-COV-2) (CORONAVIRUS DISEASE [COVID-19]), AMPLIFIED PROBE TECHNIQUE, MAKING USE OF HIGH THROUGHPUT TECHNOLOGIES AS DESCRIBED BY CMS-2020-01-R: HCPCS | Performed by: FAMILY MEDICINE

## 2022-01-04 NOTE — PROGRESS NOTES
---------- Liliana Lynnley presented to clinic for COVID-19 swab.   Liliana Hermosillo verified x2, name and  on label    Explained COVID-19 swab procedure to Liliana Lynnley.   Specimen obtained and label placed on specimen while Liliana Hermosillo was present  Liliana Hermosillo was given time to ask / answer any questions.   Liliana JOY Jaimee left in satisfactory condition./ nfairconeture, LPN

## 2022-01-07 LAB
SARS-COV-2 RNA RESP QL NAA+PROBE: NOT DETECTED
SARS-COV-2- CYCLE NUMBER: NORMAL

## 2022-02-15 ENCOUNTER — OFFICE VISIT (OUTPATIENT)
Dept: FAMILY MEDICINE | Facility: CLINIC | Age: 82
End: 2022-02-15
Payer: MEDICARE

## 2022-02-15 VITALS
HEART RATE: 74 BPM | HEIGHT: 63 IN | RESPIRATION RATE: 18 BRPM | WEIGHT: 139.63 LBS | BODY MASS INDEX: 24.74 KG/M2 | OXYGEN SATURATION: 96 % | DIASTOLIC BLOOD PRESSURE: 73 MMHG | SYSTOLIC BLOOD PRESSURE: 125 MMHG

## 2022-02-15 DIAGNOSIS — J32.9 SINUSITIS, UNSPECIFIED CHRONICITY, UNSPECIFIED LOCATION: Primary | ICD-10-CM

## 2022-02-15 DIAGNOSIS — M54.2 NECK PAIN: ICD-10-CM

## 2022-02-15 DIAGNOSIS — E78.5 DYSLIPIDEMIA: ICD-10-CM

## 2022-02-15 DIAGNOSIS — R73.9 HYPERGLYCEMIA: ICD-10-CM

## 2022-02-15 DIAGNOSIS — I10 PRIMARY HYPERTENSION: ICD-10-CM

## 2022-02-15 PROCEDURE — 99999 PR PBB SHADOW E&M-EST. PATIENT-LVL III: ICD-10-PCS | Mod: PBBFAC,,, | Performed by: FAMILY MEDICINE

## 2022-02-15 PROCEDURE — 99214 PR OFFICE/OUTPT VISIT, EST, LEVL IV, 30-39 MIN: ICD-10-PCS | Mod: S$PBB,,, | Performed by: FAMILY MEDICINE

## 2022-02-15 PROCEDURE — 99999 PR PBB SHADOW E&M-EST. PATIENT-LVL III: CPT | Mod: PBBFAC,,, | Performed by: FAMILY MEDICINE

## 2022-02-15 PROCEDURE — 96372 THER/PROPH/DIAG INJ SC/IM: CPT | Mod: PBBFAC,PN

## 2022-02-15 PROCEDURE — 99214 OFFICE O/P EST MOD 30 MIN: CPT | Mod: S$PBB,,, | Performed by: FAMILY MEDICINE

## 2022-02-15 PROCEDURE — 99213 OFFICE O/P EST LOW 20 MIN: CPT | Mod: PBBFAC,PN | Performed by: FAMILY MEDICINE

## 2022-02-15 RX ORDER — AZITHROMYCIN 250 MG/1
TABLET, FILM COATED ORAL
Qty: 6 TABLET | Refills: 0 | Status: SHIPPED | OUTPATIENT
Start: 2022-02-15 | End: 2022-08-18

## 2022-02-15 RX ORDER — PREDNISONE 20 MG/1
20 TABLET ORAL 2 TIMES DAILY
Qty: 10 TABLET | Refills: 0 | Status: SHIPPED | OUTPATIENT
Start: 2022-02-15 | End: 2022-02-20

## 2022-02-15 RX ORDER — HYDROCHLOROTHIAZIDE 12.5 MG/1
12.5 CAPSULE ORAL DAILY
COMMUNITY
Start: 2022-02-07 | End: 2022-02-15 | Stop reason: SDUPTHER

## 2022-02-15 RX ORDER — DEXAMETHASONE SODIUM PHOSPHATE 4 MG/ML
8 INJECTION, SOLUTION INTRA-ARTICULAR; INTRALESIONAL; INTRAMUSCULAR; INTRAVENOUS; SOFT TISSUE ONCE
Status: COMPLETED | OUTPATIENT
Start: 2022-02-15 | End: 2022-02-15

## 2022-02-15 RX ORDER — HYDROCHLOROTHIAZIDE 12.5 MG/1
12.5 CAPSULE ORAL DAILY
Qty: 90 CAPSULE | Refills: 3 | Status: SHIPPED | OUTPATIENT
Start: 2022-02-15 | End: 2022-08-18 | Stop reason: DRUGHIGH

## 2022-02-15 RX ADMIN — DEXAMETHASONE SODIUM PHOSPHATE 8 MG: 4 INJECTION, SOLUTION INTRAMUSCULAR; INTRAVENOUS at 01:02

## 2022-02-15 NOTE — PROGRESS NOTES
" Patient ID: Liliana Hermosillo is a 81 y.o. female.    Chief Complaint: Follow-up (Had covid 7 days ago, now thinks she has a sinus infection. Stated to have a lot of pressure in her head. ), Headache, and Sore Throat      Reviewed family, medical, surgical, and social history.    No cp/sob  No change in mental status  No fever  No asymmetrical limb swelling    Objective:      /73 (BP Location: Left arm, Patient Position: Sitting, BP Method: Medium (Automatic))   Pulse 74   Resp 18   Ht 5' 3" (1.6 m)   Wt 63.3 kg (139 lb 9.6 oz)   SpO2 96%   BMI 24.73 kg/m²   RRR, CTAB, s/nt/nd, no c/c/e, non-toxic appearing, no focal weakness  Assessment:       1. Sinusitis, unspecified chronicity, unspecified location    2. Primary hypertension    3. Neck pain    4. Dyslipidemia    5. Hyperglycemia        Plan:       Sinusitis, unspecified chronicity, unspecified location  -     dexamethasone injection 8 mg  -     azithromycin (Z-JENNIFER) 250 MG tablet; Take two tablets on day 1, then 1 tablet on days 2-5.  Dispense: 6 tablet; Refill: 0  -     predniSONE (DELTASONE) 20 MG tablet; Take 1 tablet (20 mg total) by mouth 2 (two) times daily. for 5 days  Dispense: 10 tablet; Refill: 0  -     CBC Auto Differential; Future; Expected date: 02/15/2022  -     Comprehensive Metabolic Panel; Future; Expected date: 02/15/2022  -     Lipid Panel; Future; Expected date: 02/15/2022  -     TSH; Future; Expected date: 02/15/2022  -     T4, Free; Future; Expected date: 02/15/2022  -     Hemoglobin A1C; Future; Expected date: 02/15/2022    Primary hypertension  -     CBC Auto Differential; Future; Expected date: 02/15/2022  -     Comprehensive Metabolic Panel; Future; Expected date: 02/15/2022  -     Lipid Panel; Future; Expected date: 02/15/2022  -     TSH; Future; Expected date: 02/15/2022  -     T4, Free; Future; Expected date: 02/15/2022  -     Hemoglobin A1C; Future; Expected date: 02/15/2022    Neck pain  -     MRI Cervical Spine Without " Contrast; Future; Expected date: 02/15/2022  -     CBC Auto Differential; Future; Expected date: 02/15/2022  -     Comprehensive Metabolic Panel; Future; Expected date: 02/15/2022  -     Lipid Panel; Future; Expected date: 02/15/2022  -     TSH; Future; Expected date: 02/15/2022  -     T4, Free; Future; Expected date: 02/15/2022  -     Hemoglobin A1C; Future; Expected date: 02/15/2022    Dyslipidemia  -     CBC Auto Differential; Future; Expected date: 02/15/2022  -     Comprehensive Metabolic Panel; Future; Expected date: 02/15/2022  -     Lipid Panel; Future; Expected date: 02/15/2022  -     TSH; Future; Expected date: 02/15/2022  -     T4, Free; Future; Expected date: 02/15/2022  -     Hemoglobin A1C; Future; Expected date: 02/15/2022    Hyperglycemia  -     CBC Auto Differential; Future; Expected date: 02/15/2022  -     Comprehensive Metabolic Panel; Future; Expected date: 02/15/2022  -     Lipid Panel; Future; Expected date: 02/15/2022  -     TSH; Future; Expected date: 02/15/2022  -     T4, Free; Future; Expected date: 02/15/2022  -     Hemoglobin A1C; Future; Expected date: 02/15/2022    Other orders  -     hydroCHLOROthiazide (MICROZIDE) 12.5 mg capsule; Take 1 capsule (12.5 mg total) by mouth once daily.  Dispense: 90 capsule; Refill: 3            Reviewed, monitored, evaluated, and assessed chronic conditions as outlined above  Continue current medicines, any changes noted above  As shown  Labs, radiology studies, and procedures/notes from the last 3 months were reviewed.    Risks, benefits, and side effects were discussed with the patient. All questions were answered to the fullest satisfaction of the patient, and pt verbalized understanding and agreement to treatment plan. Pt was to call with any new or worsening symptoms, or present to the ER.

## 2022-02-24 ENCOUNTER — TELEPHONE (OUTPATIENT)
Dept: FAMILY MEDICINE | Facility: CLINIC | Age: 82
End: 2022-02-24
Payer: MEDICARE

## 2022-02-24 NOTE — TELEPHONE ENCOUNTER
Attempted to contact Liliana Hermosillo to discuss   Left voice mail to return our call at [phone] on [phone]    Aissatou Ricketts LPN

## 2022-02-24 NOTE — TELEPHONE ENCOUNTER
----- Message from Shazia Delarosa sent at 2/24/2022 11:12 AM CST -----  Contact: Patient  Type:  Needs Medical Advice    Who Called:  Patient    Would the patient rather a call back or a response via [facility]?  Call    Best Call Back Number:  [phone]    Additional Information:  Patient states she has an Ulser and needs to speak to the nurse     Patient states she ate cereal and it made her sick and her legs are week     Please call to advise

## 2022-02-24 NOTE — TELEPHONE ENCOUNTER
----- Message from Meron Garcia sent at 2/24/2022  1:33 PM CST -----  Type: Needs Medical Advice  Who Called:  Pt  Best Call Back Number: [phone]  Additional Information: Pt requesting return call--please advise--thank you

## 2022-02-25 RX ORDER — ONDANSETRON 8 MG/1
8 TABLET, ORALLY DISINTEGRATING ORAL EVERY 8 HOURS PRN
Qty: 30 TABLET | Refills: 0 | Status: SHIPPED | OUTPATIENT
Start: 2022-02-25 | End: 2022-11-02

## 2022-02-25 NOTE — TELEPHONE ENCOUNTER
Patient states she has an upset stomach and would like something sent into the pharmacy. Please advise!

## 2022-02-25 NOTE — TELEPHONE ENCOUNTER
----- Message from Nai Mercado sent at 2/25/2022 10:11 AM CST -----  Contact: pt  Who Called: PT  Regarding: The pt states her stomach is upset and is requesting to have something called in to settle her stomach. Please contact the pt.   Would the patient rather a call back or a response via [facility]? Call back  Best Call Back Number: [phone]  Additional Information:

## 2022-03-07 ENCOUNTER — HOSPITAL ENCOUNTER (OUTPATIENT)
Dept: RADIOLOGY | Facility: HOSPITAL | Age: 82
Discharge: HOME | End: 2022-03-07
Attending: FAMILY MEDICINE
Payer: MEDICARE

## 2022-03-07 DIAGNOSIS — M54.2 NECK PAIN: ICD-10-CM

## 2022-03-07 PROCEDURE — 72141 MRI NECK SPINE W/O DYE: CPT | Mod: 26,,, | Performed by: RADIOLOGY

## 2022-03-07 PROCEDURE — 72141 MRI NECK SPINE W/O DYE: CPT | Mod: TC,PN

## 2022-03-07 PROCEDURE — 72141 MRI CERVICAL SPINE WITHOUT CONTRAST: ICD-10-PCS | Mod: 26,,, | Performed by: RADIOLOGY

## 2022-03-08 ENCOUNTER — PATIENT MESSAGE (OUTPATIENT)
Dept: FAMILY MEDICINE | Facility: CLINIC | Age: 82
End: 2022-03-08
Payer: MEDICARE

## 2022-03-08 DIAGNOSIS — M54.2 NECK PAIN: Primary | ICD-10-CM

## 2022-03-10 ENCOUNTER — TELEPHONE (OUTPATIENT)
Dept: PAIN MEDICINE | Facility: CLINIC | Age: 82
End: 2022-03-10
Payer: MEDICARE

## 2022-03-10 ENCOUNTER — OFFICE VISIT (OUTPATIENT)
Dept: SPINE | Facility: CLINIC | Age: 82
End: 2022-03-10
Payer: MEDICARE

## 2022-03-10 VITALS — WEIGHT: 139.56 LBS | HEIGHT: 63 IN | BODY MASS INDEX: 24.73 KG/M2

## 2022-03-10 DIAGNOSIS — M54.2 NECK PAIN: ICD-10-CM

## 2022-03-10 DIAGNOSIS — M50.30 DDD (DEGENERATIVE DISC DISEASE), CERVICAL: Primary | ICD-10-CM

## 2022-03-10 PROCEDURE — 99204 PR OFFICE/OUTPT VISIT, NEW, LEVL IV, 45-59 MIN: ICD-10-PCS | Mod: S$GLB,,, | Performed by: PHYSICAL MEDICINE & REHABILITATION

## 2022-03-10 PROCEDURE — 99204 OFFICE O/P NEW MOD 45 MIN: CPT | Mod: S$GLB,,, | Performed by: PHYSICAL MEDICINE & REHABILITATION

## 2022-03-10 NOTE — PROGRESS NOTES
SUBJECTIVE:    Patient ID: Liliana Hermosillo is a 81 y.o. female.    Chief Complaint: Neck Pain    This is a 81-year-old woman who sees Dr. Orr for her primary care.  Other than hyperlipidemia she has no chronic major medical problems.  She presents with complaints of posterior neck discomfort that radiates into the upper shoulders and when she extends her head radiates up the posterior portion of the head on both sides sometimes into the front of the head as well.  She denies any radicular arm pain or weakness.  No difficulty writing or walking.  No bowel or bladder dysfunction fever chills sweats or unexpected weight loss.  She saw Dr. Tu John, neurosurgery last year and no surgery was recommended.  He started her in physical therapy.  She attended for about 2 weeks but saw no significant improvement.  She had an MRI of the cervical spine on 03/07/2022 which is summarized below:    FINDINGS:  Image quality is degraded by patient motion on all imaging series.     The visualized posterior fossa structures including the kam, mid brain, and cerebellum are unremarkable.  No Chiari malformation.  The incidentally observed paranasal sinuses are clear.     There is reversal of the normal cervical lordosis, possibly due to neck muscle spasm.  The cervical vertebral bodies show normal height and signal intensity without evidence of acute compression fracture or pathologic marrow replacement process.  There is a 14 mm primarily T1 hyperintense probable hemangioma noted in the posterior aspect of the T4 vertebral body, unchanged.  No spondylolisthesis in the cervical spine.  There is a 3 mm anterolisthesis of T2 on T3 noted on the sagittal images.     There is degenerative disc desiccation at every cervical level.  There is anterior marginal osteophyte formation and disc space narrowing present at C5-C6 and C6-C7..     The cervical spinal cord is normal in signal intensity without evidence of cord edema, myelomalacia,  or cord syrinx.  No epidural fluid collections or masses.     There is a 12 mm STIR hyperintense focus of signal abnormality noted in the lower pole of the left thyroid lobe.  Previous ultrasound examinations have revealed a 13 mm solid nodule in the lower pole of the left thyroid lobe.  There is a 12 mm possible right supraclavicular lymph node at the base of the neck adjacent to the subclavian vasculature on series 8, image 9 and series 6, image 1, unchanged when compared to the previous MRI.     C2-C3: No disc protrusion or extrusion. No central canal stenosis or neuroforaminal stenosis.     C3-C4: There is a minimal posterior disc osteophyte complex which effaces the anterior CSF sleeve.  No disc protrusion or extrusion. No central canal stenosis or neuroforaminal stenosis.     C4-C5: There is a posterior disc osteophyte complex with a superimposed minimal broad central disc protrusion which contacts the central ventral cord.  No central canal stenosis or neuroforaminal stenosis.     C5-C6: There is a bulging posterior disc osteophyte complex which effaces the anterior CSF sleeve and flattens the ventral cord.  There is moderate-severe right neuroforaminal stenosis.  No left neuroforaminal stenosis.  No overall central canal stenosis.     C6-C7: There is a bulging posterior disc osteophyte complex and superimposed broad central disc protrusion.  There is effacement of the anterior CSF sleeve in flattening of the ventral cord.  There is prominent right facet arthropathy.  There is moderate right neuroforaminal stenosis.  No left neuroforaminal stenosis.     C7-T1: No disc protrusion or extrusion. No central canal stenosis or neuroforaminal stenosis.     Impression:     1. 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  Please correlate clinically  for symptoms referable to the right C6 and C7 nerves.  2. Reversal of the normal cervical lordosis which could relate to neck muscle spasm.  3. 14 mm probable hemangioma within the T4 vertebral body, unchanged.  4. 3 mm anterolisthesis of T2 on T3.  5. 12 mm nodule in the lower pole of the left thyroid lobe as demonstrated by previous thyroid ultrasound.  6. 12 mm probable right supraclavicular lymph node adjacent to the right subclavian vasculature, unchanged when compared to the previous MRI.        Past Medical History:   Diagnosis Date    Atypical ductal hyperplasia, breast     Chronic constipation     Diverticulitis     Diverticulosis     Edema     Hypertension     Ovarian cyst      Social History     Socioeconomic History    Marital status:    Tobacco Use    Smoking status: Former Smoker     Packs/day: 2.00     Years: 0.00     Pack years: 0.00     Types: Cigarettes     Start date: 1960     Quit date: 1985     Years since quittin.3    Smokeless tobacco: Never Used   Substance and Sexual Activity    Alcohol use: Yes     Alcohol/week: 2.0 standard drinks     Types: 2 Glasses of wine per week    Drug use: No    Sexual activity: Not Currently     Partners: Male     Birth control/protection: Abstinence   Social History Narrative    ADVANCED MD PLANS        GYN Visit & [id number] [facility]2019     Thyroid nodule.  Lung nodule.  T7 compression fracture.  Osteopenia.  Vulvodynia.    Consult Dr. Lake for recommendations.    Recommend Prolia injections.    Patient needs calcium and vitamin D level.    Consult Dr. Tucker for evaluation for multiple myeloma    Visit Summary    Prescriptions:    SIG: Percocet 5-325 mg oral tablet, 5 days, Dispense #20 Tablet, 0 Refills    Directions: Take 1/2 to 1 oral tablet every 4-6 hours as needed     Gyn Exam / Hysterectomy 10 [facility]2019     Annual exam.  History of a sacral mass.  Pudendal nerve neuropathy.    Mammogram and bone mineral density  at [facility].    MRI of the pubis/sacrum.    Medrol Dosepak.    Return to clinic in one week.    Visit Summary    Prescriptions:    SIG: Medrol (Johnny) 4 mg oral tablets,dose pack, 9 days, Dispense #1 Dose Pack, 0 Refills    Directions: take as directed     Gyn Exam / Hysterectomy 10 [facility]3/22/2018     Annual.  Pudendal neuropathy.  Vaginal atrophy.  Insomnia.  GERD.  Hyperlipidemia.    History of diverticulosis with diverticulitis.    Mammogram and bone mineral density at [facility].    Elavil 10 mg by mouth daily at bedtime.    Return to clinic in one month.    Awaiting results.  CT scan.    Consider Bentyl or Levsin for abdominal pain.    Consider Linzess at return to clinic.    PCP: Dr. Su.    Call patient and inform her that she needs annual labs performed at return to clinic.  Have her fasting.    Visit Summary     GYN Visit & [id number] [facility]4/27/2017     Pharyngitis.  Vaginal atrophy.    Visit Summary    Prescriptions:    SIG: Estring 2 mg (7.5 mcg /24 hour) ring, 90 days, Dispense #1 Ring, 4 Refills    Directions: insert vaginally every 3 months    SIG: Mucinex D  mg tablet extended release 12 hr, 10 days, Dispense #20 Tablet, 0 Refills    Directions: Take 1 oral tablet 2 times a day    SIG: Zithromax Z-Johnny 250 mg tablet, 5 days, Dispense #6 Tablet, 0 Refills    Directions: Take as directed        Plan:    11/1/2017 Annual     GYN Visit & [id number] [facility]3/9/2017     Vaginal atrophy.    Estring placed without difficulty.    Return to clinic in 8 weeks for followup.    Visit Summary    [facility]:    Mammogram-Complete ()        Plan:    11/1/2017 Annual     GYN Visit & [id number] [facility]/16[facility]     Vaginitis.  Vaginal atrophy.  HSV-2.    Return to clinic in 3 weeks.    Visit Summary    Ordered:    Affirm    Affirm        Prescriptions:    SIG: Premarin 0.625 mg/gram cream, 90 days, Dispense #3 Tube, 4 Refills    Directions: 0.5g intravaginally QHS x 2 weeks then  "biweekly    SIG: Valtrex 1 gram tablet, 90 days, Dispense #90 Tablet, 4 Refills    Directions: Take 1 oral tablet once a day        Plan:    11/1/2017 Annual     MLT Procedure #2/3/M1/24/2017     Vaginal Atrophy    Post treatment instructions and precautions explained    Questionnaire and consent forms signed    Gerson Procedure # 3 was Given    Follow up after for maintenance     MLT Procedure #2/3/M12/13/2016     Vaginal Atrophy    Gerson Procedure # 2 was Given    Follow up after 6 weeks for Procedure #3     GYN Exam/[id number] 201611/1/2016     Annual exam.  Vaginal atrophy.    Return to clinic in 6 weeks for Cara Kadi procedure #2.    Colonoscopy.  Next year.    Mammogram and bone mineral density at [facility].    Health maintenance information given to patient    Visit Summary    Plan:    12/15/2016 Mammogram    11/1/2017 Annual     MLT Procedure #2/3/M11/1/2016     Vaginal Atrophy    Gerson Procedure #  1 was Given    Follow up after 6 weeks for Procedure #2     GYN Visit & [id number] [facility]     Vaginal atrophy.  Family history of breast cancer.    Return to clinic for Cara Flanagana procedure an annual exam.    Face-to-face time with patient greater than 60 min. discussing treatment with Cara Wallis     Past Surgical History:   Procedure Laterality Date    ANKLE ARTHROSCOPY W/ OPEN REPAIR      APPENDECTOMY  1972    BREAST BIOPSY Left     CHOLECYSTECTOMY      COLONOSCOPY      EYE SURGERY  2012    HYSTERECTOMY      KNEE SURGERY      TONSILLECTOMY  1948    UPPER GASTROINTESTINAL ENDOSCOPY       Family History   Problem Relation Age of Onset    Colon cancer Mother     Anuerysm Father     Breast cancer Sister     Breast cancer Maternal Aunt     Collagen disease Neg Hx     Ovarian cancer Neg Hx      Vitals:    03/10/22 1444   Weight: 63.3 kg (139 lb 8.8 oz)   Height: 5' 3" (1.6 m)       Review of Systems   Constitutional: Negative for chills, diaphoresis, fatigue, fever and " unexpected weight change.   HENT: Negative for trouble swallowing.    Eyes: Negative for visual disturbance.   Respiratory: Negative for shortness of breath.    Cardiovascular: Negative for chest pain.   Gastrointestinal: Negative for abdominal pain, constipation, nausea and vomiting.   Genitourinary: Negative for difficulty urinating.   Musculoskeletal: Negative for arthralgias, back pain, gait problem, joint swelling, myalgias, neck pain and neck stiffness.   Neurological: Negative for dizziness, speech difficulty, weakness, light-headedness, numbness and headaches.          Objective:      Physical Exam  Neurological:      Mental Status: She is alert and oriented to person, place, and time.      Comments: She is awake and in no acute distress  No point tenderness or palpable masses about the cervical spine  Reflexes- +1-+2 reflexes at the following:   C5-Biceps   C6-Brachioradialis   C7-Triceps   L3/4-Patellar   S1-Achilles  Nita sign negative bilaterally  Strength testing- 5/5 strength in the following muscle groups:  C5-Elbow flexion  C6-Wrist extension  C7-Elbow extension  C8-Finger flexion  T1-Finger abduction  L2-Hip flexion  L3-Knee extension  L4-Ankle dorsiflexion  L5-Great toe extension  S1-Ankle plantar flexion                 Assessment:       1. Neck pain           Plan:     she has a nonfocal neurological examination and no historical red flags.  She has chronic neck pain on basis of cervical degenerative disc disease and facet arthropathy.  No significant improvement with physical therapy.  I recommend she start with interlaminar injections at C7-T1.  Consider medial branch blocks and radiofrequency ablation of the C2-3 facet joint and 3rd occipital nerves bilaterally.  Follow-up with me after the procedure      Neck pain  -     [facility]ulatory referral[facility]

## 2022-03-10 NOTE — H&P (VIEW-ONLY)
SUBJECTIVE:    Patient ID: Liliana Hermosillo is a 81 y.o. female.    Chief Complaint: Neck Pain    This is a 81-year-old woman who sees Dr. Orr for her primary care.  Other than hyperlipidemia she has no chronic major medical problems.  She presents with complaints of posterior neck discomfort that radiates into the upper shoulders and when she extends her head radiates up the posterior portion of the head on both sides sometimes into the front of the head as well.  She denies any radicular arm pain or weakness.  No difficulty writing or walking.  No bowel or bladder dysfunction fever chills sweats or unexpected weight loss.  She saw Dr. Tu John, neurosurgery last year and no surgery was recommended.  He started her in physical therapy.  She attended for about 2 weeks but saw no significant improvement.  She had an MRI of the cervical spine on 03/07/2022 which is summarized below:    FINDINGS:  Image quality is degraded by patient motion on all imaging series.     The visualized posterior fossa structures including the kam, mid brain, and cerebellum are unremarkable.  No Chiari malformation.  The incidentally observed paranasal sinuses are clear.     There is reversal of the normal cervical lordosis, possibly due to neck muscle spasm.  The cervical vertebral bodies show normal height and signal intensity without evidence of acute compression fracture or pathologic marrow replacement process.  There is a 14 mm primarily T1 hyperintense probable hemangioma noted in the posterior aspect of the T4 vertebral body, unchanged.  No spondylolisthesis in the cervical spine.  There is a 3 mm anterolisthesis of T2 on T3 noted on the sagittal images.     There is degenerative disc desiccation at every cervical level.  There is anterior marginal osteophyte formation and disc space narrowing present at C5-C6 and C6-C7..     The cervical spinal cord is normal in signal intensity without evidence of cord edema, myelomalacia,  or cord syrinx.  No epidural fluid collections or masses.     There is a 12 mm STIR hyperintense focus of signal abnormality noted in the lower pole of the left thyroid lobe.  Previous ultrasound examinations have revealed a 13 mm solid nodule in the lower pole of the left thyroid lobe.  There is a 12 mm possible right supraclavicular lymph node at the base of the neck adjacent to the subclavian vasculature on series 8, image 9 and series 6, image 1, unchanged when compared to the previous MRI.     C2-C3: No disc protrusion or extrusion. No central canal stenosis or neuroforaminal stenosis.     C3-C4: There is a minimal posterior disc osteophyte complex which effaces the anterior CSF sleeve.  No disc protrusion or extrusion. No central canal stenosis or neuroforaminal stenosis.     C4-C5: There is a posterior disc osteophyte complex with a superimposed minimal broad central disc protrusion which contacts the central ventral cord.  No central canal stenosis or neuroforaminal stenosis.     C5-C6: There is a bulging posterior disc osteophyte complex which effaces the anterior CSF sleeve and flattens the ventral cord.  There is moderate-severe right neuroforaminal stenosis.  No left neuroforaminal stenosis.  No overall central canal stenosis.     C6-C7: There is a bulging posterior disc osteophyte complex and superimposed broad central disc protrusion.  There is effacement of the anterior CSF sleeve in flattening of the ventral cord.  There is prominent right facet arthropathy.  There is moderate right neuroforaminal stenosis.  No left neuroforaminal stenosis.     C7-T1: No disc protrusion or extrusion. No central canal stenosis or neuroforaminal stenosis.     Impression:     1. 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  Please correlate clinically  for symptoms referable to the right C6 and C7 nerves.  2. Reversal of the normal cervical lordosis which could relate to neck muscle spasm.  3. 14 mm probable hemangioma within the T4 vertebral body, unchanged.  4. 3 mm anterolisthesis of T2 on T3.  5. 12 mm nodule in the lower pole of the left thyroid lobe as demonstrated by previous thyroid ultrasound.  6. 12 mm probable right supraclavicular lymph node adjacent to the right subclavian vasculature, unchanged when compared to the previous MRI.        Past Medical History:   Diagnosis Date    Atypical ductal hyperplasia, breast     Chronic constipation     Diverticulitis     Diverticulosis     Edema     Hypertension     Ovarian cyst      Social History     Socioeconomic History    Marital status:    Tobacco Use    Smoking status: Former Smoker     Packs/day: 2.00     Years: 0.00     Pack years: 0.00     Types: Cigarettes     Start date: 1960     Quit date: 1985     Years since quittin.3    Smokeless tobacco: Never Used   Substance and Sexual Activity    Alcohol use: Yes     Alcohol/week: 2.0 standard drinks     Types: 2 Glasses of wine per week    Drug use: No    Sexual activity: Not Currently     Partners: Male     Birth control/protection: Abstinence   Social History Narrative    ADVANCED MD PLANS        GYN Visit & [id number] [facility]2019     Thyroid nodule.  Lung nodule.  T7 compression fracture.  Osteopenia.  Vulvodynia.    Consult Dr. Lake for recommendations.    Recommend Prolia injections.    Patient needs calcium and vitamin D level.    Consult Dr. Tucker for evaluation for multiple myeloma    Visit Summary    Prescriptions:    SIG: Percocet 5-325 mg oral tablet, 5 days, Dispense #20 Tablet, 0 Refills    Directions: Take 1/2 to 1 oral tablet every 4-6 hours as needed     Gyn Exam / Hysterectomy 10 [facility]2019     Annual exam.  History of a sacral mass.  Pudendal nerve neuropathy.    Mammogram and bone mineral density  at [facility].    MRI of the pubis/sacrum.    Medrol Dosepak.    Return to clinic in one week.    Visit Summary    Prescriptions:    SIG: Medrol (Johnny) 4 mg oral tablets,dose pack, 9 days, Dispense #1 Dose Pack, 0 Refills    Directions: take as directed     Gyn Exam / Hysterectomy 10 [facility]     Annual.  Pudendal neuropathy.  Vaginal atrophy.  Insomnia.  GERD.  Hyperlipidemia.    History of diverticulosis with diverticulitis.    Mammogram and bone mineral density at [facility].    Elavil 10 mg by mouth daily at bedtime.    Return to clinic in one month.    Awaiting results.  CT scan.    Consider Bentyl or Levsin for abdominal pain.    Consider Linzess at return to clinic.    PCP: Dr. Su.    Call patient and inform her that she needs annual labs performed at return to clinic.  Have her fasting.    Visit Summary     GYN Visit & [id number] [facility]4/27/2017     Pharyngitis.  Vaginal atrophy.    Visit Summary    Prescriptions:    SIG: Estring 2 mg (7.5 mcg /24 hour) ring, 90 days, Dispense #1 Ring, 4 Refills    Directions: insert vaginally every 3 months    SIG: Mucinex D  mg tablet extended release 12 hr, 10 days, Dispense #20 Tablet, 0 Refills    Directions: Take 1 oral tablet 2 times a day    SIG: Zithromax Z-Johnny 250 mg tablet, 5 days, Dispense #6 Tablet, 0 Refills    Directions: Take as directed        Plan:    11/1/2017 Annual     GYN Visit & [id number] [facility]3/9/2017     Vaginal atrophy.    Estring placed without difficulty.    Return to clinic in 8 weeks for followup.    Visit Summary    [facility]:    Mammogram-Complete ()        Plan:    11/1/2017 Annual     GYN Visit & [id number] [facility]2/16/2017     Vaginitis.  Vaginal atrophy.  HSV-2.    Return to clinic in 3 weeks.    Visit Summary    Ordered:    Affirm    Affirm        Prescriptions:    SIG: Premarin 0.625 mg/gram cream, 90 days, Dispense #3 Tube, 4 Refills    Directions: 0.5g intravaginally QHS x 2 weeks then  "biweekly    SIG: Valtrex 1 gram tablet, 90 days, Dispense #90 Tablet, 4 Refills    Directions: Take 1 oral tablet once a day        Plan:    11/1/2017 Annual     MLT Procedure #2/3/M1/24/2017     Vaginal Atrophy    Post treatment instructions and precautions explained    Questionnaire and consent forms signed    Gerson Procedure # 3 was Given    Follow up after for maintenance     MLT Procedure #2/3/M12/13/2016     Vaginal Atrophy    Gerson Procedure # 2 was Given    Follow up after 6 weeks for Procedure #3     GYN Exam/[id number] 201611/1/2016     Annual exam.  Vaginal atrophy.    Return to clinic in 6 weeks for Cara Kadi procedure #2.    Colonoscopy.  Next year.    Mammogram and [facility] mineral density at [facility].    Health maintenance information given to patient    Visit Summary    Plan:    12/15/2016 Mammogram    11/1/2017 Annual     MLT Procedure #2/3/M11/1/2016     Vaginal Atrophy    Gerson Procedure #  1 was Given    Follow up after 6 weeks for Procedure #2     GYN Visit & [id number] [facility]/25[facility]     Vaginal atrophy.  Family history of breast cancer.    Return to clinic for Cara Flanagana procedure an annual exam.    Face-to-face time with patient greater than 60 min. discussing treatment with Cara Wallis     Past Surgical History:   Procedure Laterality Date    ANKLE ARTHROSCOPY W/ OPEN REPAIR      APPENDECTOMY  1972    BREAST BIOPSY Left     CHOLECYSTECTOMY      COLONOSCOPY      EYE SURGERY  2012    HYSTERECTOMY      KNEE SURGERY      TONSILLECTOMY  1948    UPPER GASTROINTESTINAL ENDOSCOPY       Family History   Problem Relation Age of Onset    Colon cancer Mother     Anuerysm Father     Breast cancer Sister     Breast cancer Maternal Aunt     Collagen disease Neg Hx     Ovarian cancer Neg Hx      Vitals:    03/10/22 1444   Weight: 63.3 kg (139 lb 8.8 oz)   Height: 5' 3" (1.6 m)       Review of Systems   Constitutional: Negative for chills, diaphoresis, fatigue, fever and " unexpected weight change.   HENT: Negative for trouble swallowing.    Eyes: Negative for visual disturbance.   Respiratory: Negative for shortness of breath.    Cardiovascular: Negative for chest pain.   Gastrointestinal: Negative for abdominal pain, constipation, nausea and vomiting.   Genitourinary: Negative for difficulty urinating.   Musculoskeletal: Negative for arthralgias, back pain, gait problem, joint swelling, myalgias, neck pain and neck stiffness.   Neurological: Negative for dizziness, speech difficulty, weakness, light-headedness, numbness and headaches.          Objective:      Physical Exam  Neurological:      Mental Status: She is alert and oriented to person, place, and time.      Comments: She is awake and in no acute distress  No point tenderness or palpable masses about the cervical spine  Reflexes- +1-+2 reflexes at the following:   C5-Biceps   C6-Brachioradialis   C7-Triceps   L3/4-Patellar   S1-Achilles  Nita sign negative bilaterally  Strength testing- 5/5 strength in the following muscle groups:  C5-Elbow flexion  C6-Wrist extension  C7-Elbow extension  C8-Finger flexion  T1-Finger abduction  L2-Hip flexion  L3-Knee extension  L4-Ankle dorsiflexion  L5-Great toe extension  S1-Ankle plantar flexion                 Assessment:       1. Neck pain           Plan:     she has a nonfocal neurological examination and no historical red flags.  She has chronic neck pain on basis of cervical degenerative disc disease and facet arthropathy.  No significant improvement with physical therapy.  I recommend she start with interlaminar injections at C7-T1.  Consider medial branch blocks and radiofrequency ablation of the C2-3 facet joint and 3rd occipital nerves bilaterally.  Follow-up with me after the procedure      Neck pain  -     Ambulatory referral/consult to [facility]

## 2022-03-28 RX ORDER — SODIUM CHLORIDE, SODIUM LACTATE, POTASSIUM CHLORIDE, CALCIUM CHLORIDE 600; 310; 30; 20 MG/100ML; MG/100ML; MG/100ML; MG/100ML
INJECTION, SOLUTION INTRAVENOUS CONTINUOUS
Status: CANCELLED | OUTPATIENT
Start: 2022-03-28

## 2022-03-30 ENCOUNTER — HOSPITAL ENCOUNTER (OUTPATIENT)
Facility: HOSPITAL | Age: 82
Discharge: HOME | End: 2022-03-30
Attending: ANESTHESIOLOGY | Admitting: ANESTHESIOLOGY
Payer: MEDICARE

## 2022-03-30 DIAGNOSIS — M50.30 DEGENERATIVE DISC DISEASE, CERVICAL: Primary | ICD-10-CM

## 2022-03-30 PROCEDURE — 25500020 PHARM REV CODE 255: Performed by: ANESTHESIOLOGY

## 2022-03-30 PROCEDURE — 62321 NJX INTERLAMINAR CRV/THRC: CPT | Mod: ,,, | Performed by: ANESTHESIOLOGY

## 2022-03-30 PROCEDURE — 64479 NJX AA&/STRD TFRM EPI C/T 1: CPT | Performed by: ANESTHESIOLOGY

## 2022-03-30 PROCEDURE — 63600175 PHARM REV CODE 636 W HCPCS: Performed by: ANESTHESIOLOGY

## 2022-03-30 PROCEDURE — 25000003 PHARM REV CODE 250: Performed by: ANESTHESIOLOGY

## 2022-03-30 PROCEDURE — 62321 NJX INTERLAMINAR CRV/THRC: CPT | Performed by: ANESTHESIOLOGY

## 2022-03-30 PROCEDURE — 62321 PR INJ CERV/THORAC, W/GUIDANCE: ICD-10-PCS | Mod: ,,, | Performed by: ANESTHESIOLOGY

## 2022-03-30 RX ORDER — IBUPROFEN 200 MG
400 TABLET ORAL EVERY 6 HOURS PRN
COMMUNITY

## 2022-03-30 RX ORDER — LIDOCAINE HYDROCHLORIDE 10 MG/ML
INJECTION INFILTRATION; PERINEURAL
Status: DISCONTINUED | OUTPATIENT
Start: 2022-03-30 | End: 2022-03-30 | Stop reason: HOSPADM

## 2022-03-30 RX ORDER — FENTANYL CITRATE 50 UG/ML
INJECTION, SOLUTION INTRAMUSCULAR; INTRAVENOUS
Status: DISCONTINUED | OUTPATIENT
Start: 2022-03-30 | End: 2022-03-30 | Stop reason: HOSPADM

## 2022-03-30 RX ORDER — MIDAZOLAM HYDROCHLORIDE 5 MG/ML
INJECTION INTRAMUSCULAR; INTRAVENOUS
Status: DISCONTINUED | OUTPATIENT
Start: 2022-03-30 | End: 2022-03-30 | Stop reason: HOSPADM

## 2022-03-30 RX ORDER — METHYLPREDNISOLONE ACETATE 80 MG/ML
INJECTION, SUSPENSION INTRA-ARTICULAR; INTRALESIONAL; INTRAMUSCULAR; SOFT TISSUE
Status: DISCONTINUED | OUTPATIENT
Start: 2022-03-30 | End: 2022-03-30 | Stop reason: HOSPADM

## 2022-03-30 NOTE — DISCHARGE INSTRUCTIONS
[phone]  [facility]   [phone]  [facility]      [phone]    Managing nausea    Some people have an upset stomach after surgery. This is often because of anesthesia, pain, or pain medicine, or the stress of surgery. These tips will help you handle nausea and eat healthy foods as you get better. If you were on a special food plan before surgery, ask your healthcare provider if you should follow it while you get better. These tips may help:  Do not push yourself to eat. Your body will tell you when to eat and how much.  Start off with clear liquids and soup. They are easier to digest.  Next try semi-solid foods, such as mashed potatoes, applesauce, and gelatin, as you feel ready.  Slowly move to solid foods. Dont eat fatty, rich, or spicy foods at first.  Do not force yourself to have 3 large meals a day. Instead eat smaller amounts more often.  Take pain medicines with a small amount of solid food, such as crackers or toast, to avoid nausea.

## 2022-03-30 NOTE — DISCHARGE SUMMARY
[facility] Surgery  Discharge Note  Short Stay    Procedure(s) (LRB):  Injection-steroid-epidural-cervical (N/A)    OUTCOME: Patient tolerated treatment/procedure well without complication and is now ready for discharge.    DISPOSITION: Home or Self Care    FINAL DIAGNOSIS:  Degenerative disc disease, cervical    FOLLOWUP: In clinic    DISCHARGE INSTRUCTIONS:    Discharge Procedure Orders   Diet general     Call MD for:  temperature >100.4     Call MD for:  persistent nausea and vomiting     Call MD for:  severe uncontrolled pain     Call MD for:  difficulty breathing, headache or visual disturbances     Call MD for:  redness, tenderness, or signs of infection (pain, swelling, redness, odor or green/yellow discharge around incision site)     Call MD for:  hives     Call MD for:  persistent dizziness or light-headedness     Call MD for:  extreme fatigue        TIME SPENT ON DISCHARGE: 30 minutes

## 2022-03-30 NOTE — OP NOTE
PROCEDURE DATE: 3/30/2022    PROCEDURE: C7-T1 cervical interlaminar epidural steroid injection under utilizing fluoroscopy.    DIAGNOSIS: Cervical Degenerative Disc Diease; Cervical Radiculitis  POSTOP DIAGNOSIS: SAME    PHYSICIAN: Kobe Rodríguez MD    MEDICATIONS INJECTED:  Depo-medrol 80 mg followed by a slow injection of 4 mL sterile, preservative-free normal saline.    LOCAL ANESTHETIC USED: Lidocaine 1%, 4 ml.    SEDATION MEDICATIONS: RN IV sedation    COMPLICATIONS:  none    ESTIMATED BLOOD LOSS: none    TECHNIQUE:  A time-out was taken to identify patient and procedure prior to starting the procedure.  With the patient laying in a prone position with the neck in a mid-flexed forward position, the area was prepped and draped in the usual sterile fashion using ChloraPrep and a fenestrated drape.  The area was determined under AP fluoroscopic guidance.  Local anesthetic was given using a 25-gauge 1.5 inch needle by raising a wheal and then infiltrating ventrally.  A 3.5 inch 20-gauge Touhy needle was introduced under fluoroscopic guidance to meet the lamina of C7.  The needle was then hinged under the lamina then advanced using loss of resistance technique.  Once the tip of the needle was in the desired position, the 2 mL contrast dye Omnipaque was injected to determine placement and no uptake.  The steroid was then injected slowly followed by a slow injection of 4 mL of the sterile preservative-free normal saline.  The patient tolerated the procedure well.    The patient was monitored after the procedure and was given post-procedure and discharge instructions to follow at home. The patient was discharged in a stable condition.

## 2022-03-30 NOTE — PLAN OF CARE
Has met unit/department guidelines for discharge from each phase of the post procedure continuum. Leaving floor per w/c with RN. RASHEED x3. Resp even and unlabored room air. No distress noted. All personal belongings returned to pt.

## 2022-03-31 VITALS
DIASTOLIC BLOOD PRESSURE: 55 MMHG | WEIGHT: 139 LBS | TEMPERATURE: 98 F | HEART RATE: 77 BPM | OXYGEN SATURATION: 96 % | BODY MASS INDEX: 24.63 KG/M2 | SYSTOLIC BLOOD PRESSURE: 117 MMHG | HEIGHT: 63 IN | RESPIRATION RATE: 13 BRPM

## 2022-04-29 ENCOUNTER — OFFICE VISIT (OUTPATIENT)
Dept: SPINE | Facility: CLINIC | Age: 82
End: 2022-04-29
Payer: MEDICARE

## 2022-04-29 VITALS — WEIGHT: 139 LBS | BODY MASS INDEX: 24.63 KG/M2 | HEIGHT: 63 IN

## 2022-04-29 DIAGNOSIS — M54.2 NECK PAIN: Primary | ICD-10-CM

## 2022-04-29 DIAGNOSIS — R20.0 BILATERAL LEG NUMBNESS: ICD-10-CM

## 2022-04-29 PROCEDURE — 99213 OFFICE O/P EST LOW 20 MIN: CPT | Mod: S$GLB,,, | Performed by: PHYSICAL MEDICINE & REHABILITATION

## 2022-04-29 PROCEDURE — 99213 PR OFFICE/OUTPT VISIT, EST, LEVL III, 20-29 MIN: ICD-10-PCS | Mod: S$GLB,,, | Performed by: PHYSICAL MEDICINE & REHABILITATION

## 2022-04-29 NOTE — PROGRESS NOTES
SUBJECTIVE:    Patient ID: Liliana Hermosillo is a 81 y.o. female.    Chief Complaint: Follow-up (LAZARO)    She is here for follow-up status post interlaminar injection C7-T1 on 2022 with Dr. Rodríguez.  Good response to that procedure.  About 75% improvement.  She has decreased shooting pains into the back of her head.  She still has problems with pain when rotating her head especially to the left.  Her current pain level with regards to her neck is 0/10.  Today she is also talking about decreased sensation in her legs below the knees.  She says she can not tell where her legs are going.  This is been a problem for about 2 years.  I get the impression she saw Neurology but I do not have the details.  Her leg symptoms tend to improve with walking.        Past Medical History:   Diagnosis Date    Atypical ductal hyperplasia, breast     Chronic constipation     Diverticulitis     Diverticulosis     Edema     Hypertension     Ovarian cyst      Social History     Socioeconomic History    Marital status:    Tobacco Use    Smoking status: Former Smoker     Packs/day: 2.00     Years: 0.00     Pack years: 0.00     Types: Cigarettes     Start date: 1960     Quit date: 1985     Years since quittin.4    Smokeless tobacco: Never Used   Substance and Sexual Activity    Alcohol use: Yes     Alcohol/week: 2.0 standard drinks     Types: 2 Glasses of wine per week    Drug use: No    Sexual activity: Not Currently     Partners: Male     Birth control/protection: Abstinence   Social History Narrative    ADVANCED MD PLANS        GYN Visit & [id number] [facility]2019     Thyroid nodule.  Lung nodule.  T7 compression fracture.  Osteopenia.  Vulvodynia.    Consult Dr. Lake for recommendations.    Recommend Prolia injections.    Patient needs calcium and vitamin D level.    Consult Dr. Tucker for evaluation for multiple myeloma    Visit Summary    Prescriptions:    SIG: Percocet 5-325 mg oral tablet, 5 days,  Dispense #20 Tablet, 0 Refills    Directions: Take 1/2 to 1 oral tablet every 4-6 hours as needed     [facility]     Annual exam.  History of a sacral mass.  Pudendal nerve neuropathy.    Mammogram and bone mineral density at [facility].    MRI of the pubis/sacrum.    Medrol Dosepak.    Return to clinic in one week.    Visit Summary    Prescriptions:    SIG: Medrol (Johnny) 4 mg oral tablets,dose pack, 9 days, Dispense #1 Dose Pack, 0 Refills    Directions: take as directed     Gyn Exam / Hysterectomy 10 [facility]     Annual.  Pudendal neuropathy.  Vaginal atrophy.  Insomnia.  GERD.  Hyperlipidemia.    History of diverticulosis with diverticulitis.    Mammogram and bone mineral density at [facility].    Elavil 10 mg by mouth daily at bedtime.    Return to clinic in one month.    Awaiting results.  CT scan.    Consider Bentyl or Levsin for abdominal pain.    Consider Linzess at return to clinic.    PCP: Dr. Su.    Call patient and inform her that she needs annual labs performed at return to clinic.  Have her fasting.    Visit Summary     GYN Visit & [id number] [facility]4/27/2017     Pharyngitis.  Vaginal atrophy.    Visit Summary    Prescriptions:    SIG: Estring 2 mg (7.5 mcg /24 hour) ring, 90 days, Dispense #1 Ring, 4 Refills    Directions: insert vaginally every 3 months    SIG: Mucinex D  mg tablet extended release 12 hr, 10 days, Dispense #20 Tablet, 0 Refills    Directions: Take 1 oral tablet 2 times a day    SIG: Zithromax Z-Johnny 250 mg tablet, 5 days, Dispense #6 Tablet, 0 Refills    Directions: Take as directed        Plan:    11/1/2017 Annual     GYN Visit & [id number] [facility]3/[facility]2017     Vaginal atrophy.    Estring placed without difficulty.    Return to clinic in 8 weeks for followup.    Visit Summary    [facility]:    Mammogram-Complete ()        Plan:    11/1/2017 Annual     GYN Visit & [id number] [facility]     Vaginitis.  Vaginal  atrophy.  HSV-2.    Return to clinic in 3 weeks.    Visit Summary    Ordered:    Affirm    Affirm        Prescriptions:    SIG: Premarin 0.625 mg/gram cream, 90 days, Dispense #3 Tube, 4 Refills    Directions: 0.5g intravaginally QHS x 2 weeks then biweekly    SIG: Valtrex 1 gram tablet, 90 days, Dispense #90 Tablet, 4 Refills    Directions: Take 1 oral tablet once a day        Plan:    11/1/2017 Annual     MLT Procedure #2/3/M1/24/2017     Vaginal Atrophy    Post treatment instructions and precautions explained    Questionnaire and consent forms signed    Gerson Procedure # 3 was Given    Follow up after for maintenance     MLT Procedure #2/3/M12/13/2016     Vaginal Atrophy    Gerson Procedure # 2 was Given    Follow up after 6 weeks for Procedure #3     GYN Exam/[id number] 201611/1/2016     Annual exam.  Vaginal atrophy.    Return to clinic in 6 weeks for Cara Wallis procedure #2.    Colonoscopy.  Next year.    Mammogram and bone mineral density at [facility].    Health maintenance information given to patient    Visit Summary    Plan:    12/15/2016 Mammogram    11/1/2017 Annual     MLT Procedure #2/3/M11/1/2016     Vaginal Atrophy    Gerson Procedure #  1 was Given    Follow up after 6 weeks for Procedure #2     [id number]     V[facility]inal [facility].  Family history of breast cancer.    Return to clinic for Cara Wallis procedure an annual exam.    Face-to-face time with patient greater than 60 min. discussing treatment with Cara Wallis     Past Surgical History:   Procedure Laterality Date    ANKLE ARTHROSCOPY W/ OPEN REPAIR      APPENDECTOMY  1972    BREAST BIOPSY Left     CHOLECYSTECTOMY      COLONOSCOPY      EPIDURAL STEROID INJECTION INTO CERVICAL SPINE N/A 3/30/2022    Procedure: Injection-steroid-epidural-cervical;  Surgeon: Kobe Rodríguez MD;  Location: [facility] OR;  Service: Pain Management;  Laterality: N/A;  c7-t1    EYE SURGERY  2012    HYSTERECTOMY      KNEE  "SURGERY      TONSILLECTOMY  1948    UPPER GASTROINTESTINAL ENDOSCOPY       Family History   Problem Relation Age of Onset    Colon cancer Mother     Anuerysm Father     Breast cancer Sister     Breast cancer Maternal Aunt     Collagen disease Neg Hx     Ovarian cancer Neg Hx      Vitals:    04/29/22 1059   Weight: 63 kg (139 lb)   Height: 5' 3" (1.6 m)       Review of Systems   Constitutional: Negative for chills, diaphoresis, fatigue, fever and unexpected weight change.   HENT: Negative for trouble swallowing.    Eyes: Negative for visual disturbance.   Respiratory: Negative for shortness of breath.    Cardiovascular: Negative for chest pain.   Gastrointestinal: Negative for abdominal pain, constipation, nausea and vomiting.   Genitourinary: Negative for difficulty urinating.   Musculoskeletal: Negative for arthralgias, back pain, gait problem, joint swelling, myalgias, neck pain and neck stiffness.   Neurological: Negative for dizziness, speech difficulty, weakness, light-headedness, numbness and headaches.          Objective:      Physical Exam  Neurological:      Mental Status: She is alert and oriented to person, place, and time.             Assessment:       1. Neck pain    2. Bilateral leg numbness           Plan:     improved following interlaminar injection C7-T1.  We can repeat that procedure as needed.  We can consider medial branch blocks and radiofrequency ablation of the C5-6 and C6-7 facet joints.  For symptoms radiating into the back of the head we may consider medial branch blocks of the C2-3 facet joints along with the 3rd occipital nerves.  I can report the MRI to her over the phone.  If I do not see anything that would explain her leg symptoms then I would recommend follow-up with neurology.  Otherwise she can follow up with me on an as-needed basis      Neck pain    Bilateral leg numbness  -     MRI Lumbar Spine Without Contrast; Future; Expected date: 04/29/2022          "

## 2022-05-13 ENCOUNTER — TELEPHONE (OUTPATIENT)
Dept: PAIN MEDICINE | Facility: CLINIC | Age: 82
End: 2022-05-13
Payer: MEDICARE

## 2022-05-13 ENCOUNTER — HOSPITAL ENCOUNTER (OUTPATIENT)
Dept: RADIOLOGY | Facility: HOSPITAL | Age: 82
Discharge: HOME | End: 2022-05-13
Attending: PHYSICAL MEDICINE & REHABILITATION
Payer: MEDICARE

## 2022-05-13 DIAGNOSIS — R20.0 BILATERAL LEG NUMBNESS: ICD-10-CM

## 2022-05-13 PROCEDURE — 72148 MRI LUMBAR SPINE WITHOUT CONTRAST: ICD-10-PCS | Mod: 26,,, | Performed by: RADIOLOGY

## 2022-05-13 PROCEDURE — 72148 MRI LUMBAR SPINE W/O DYE: CPT | Mod: TC

## 2022-05-13 PROCEDURE — 72148 MRI LUMBAR SPINE W/O DYE: CPT | Mod: 26,,, | Performed by: RADIOLOGY

## 2022-05-13 NOTE — TELEPHONE ENCOUNTER
----- Message from Charanjit Conner MD sent at 5/13/2022  4:39 PM CDT -----  Please inform her that I reviewed her lumbar MRI and there is nothing there that explains the numbness in her legs.  There is an incidentally noted kidney cyst which needs no further evaluation.  I recommend she follow-up with her neurologist for ongoing complaints of leg numbness

## 2022-05-26 ENCOUNTER — TELEPHONE (OUTPATIENT)
Dept: PAIN MEDICINE | Facility: CLINIC | Age: 82
End: 2022-05-26
Payer: MEDICARE

## 2022-05-26 ENCOUNTER — TELEPHONE (OUTPATIENT)
Dept: PHYSICAL MEDICINE AND REHAB | Facility: CLINIC | Age: 82
End: 2022-05-26
Payer: MEDICARE

## 2022-05-26 NOTE — TELEPHONE ENCOUNTER
----- Message from Charanjit Conner MD sent at 5/26/2022  4:40 PM CDT -----  I submitted the request for cervical epidural steroid injection.  Also I reviewed her lumbar MRI and there is nothing on there that would explain the symptoms in her legs.  I recommend she follow-up with neurology

## 2022-05-26 NOTE — TELEPHONE ENCOUNTER
Pt states she would like the neck dominga ordered. She said she was told that when she was ready to do the epidural you could put in orders. She would like it done in [facility]. Please advise

## 2022-05-26 NOTE — TELEPHONE ENCOUNTER
----- Message from Charanjit Conner MD sent at 5/26/2022  4:40 PM CDT -----  Please schedule for interlaminar injection C7-T1.  She would like the procedure done in [place]

## 2022-05-27 ENCOUNTER — PATIENT MESSAGE (OUTPATIENT)
Dept: PAIN MEDICINE | Facility: CLINIC | Age: 82
End: 2022-05-27
Payer: MEDICARE

## 2022-05-27 DIAGNOSIS — M54.12 CERVICAL RADICULITIS: ICD-10-CM

## 2022-05-27 DIAGNOSIS — M50.30 DDD (DEGENERATIVE DISC DISEASE), CERVICAL: Primary | ICD-10-CM

## 2022-07-06 ENCOUNTER — HOSPITAL ENCOUNTER (OUTPATIENT)
Facility: HOSPITAL | Age: 82
Discharge: HOME | End: 2022-07-06
Attending: ANESTHESIOLOGY | Admitting: ANESTHESIOLOGY
Payer: MEDICARE

## 2022-07-06 VITALS
WEIGHT: 140 LBS | HEIGHT: 63 IN | SYSTOLIC BLOOD PRESSURE: 116 MMHG | HEART RATE: 70 BPM | BODY MASS INDEX: 24.8 KG/M2 | DIASTOLIC BLOOD PRESSURE: 60 MMHG | TEMPERATURE: 98 F | RESPIRATION RATE: 15 BRPM | OXYGEN SATURATION: 98 %

## 2022-07-06 DIAGNOSIS — M50.30 DEGENERATIVE DISC DISEASE, CERVICAL: Primary | ICD-10-CM

## 2022-07-06 PROCEDURE — 25000003 PHARM REV CODE 250: Performed by: ANESTHESIOLOGY

## 2022-07-06 PROCEDURE — 62321 NJX INTERLAMINAR CRV/THRC: CPT | Mod: ,,, | Performed by: ANESTHESIOLOGY

## 2022-07-06 PROCEDURE — 62321 NJX INTERLAMINAR CRV/THRC: CPT | Performed by: ANESTHESIOLOGY

## 2022-07-06 PROCEDURE — 62321 PR INJ CERV/THORAC, W/GUIDANCE: ICD-10-PCS | Mod: ,,, | Performed by: ANESTHESIOLOGY

## 2022-07-06 PROCEDURE — 63600175 PHARM REV CODE 636 W HCPCS: Performed by: ANESTHESIOLOGY

## 2022-07-06 PROCEDURE — 25500020 PHARM REV CODE 255: Performed by: ANESTHESIOLOGY

## 2022-07-06 RX ORDER — MIDAZOLAM HYDROCHLORIDE 1 MG/ML
INJECTION, SOLUTION INTRAMUSCULAR; INTRAVENOUS
Status: DISCONTINUED | OUTPATIENT
Start: 2022-07-06 | End: 2022-07-06 | Stop reason: HOSPADM

## 2022-07-06 RX ORDER — METHYLPREDNISOLONE ACETATE 80 MG/ML
INJECTION, SUSPENSION INTRA-ARTICULAR; INTRALESIONAL; INTRAMUSCULAR; SOFT TISSUE
Status: DISCONTINUED | OUTPATIENT
Start: 2022-07-06 | End: 2022-07-06 | Stop reason: HOSPADM

## 2022-07-06 RX ORDER — SODIUM CHLORIDE, SODIUM LACTATE, POTASSIUM CHLORIDE, CALCIUM CHLORIDE 600; 310; 30; 20 MG/100ML; MG/100ML; MG/100ML; MG/100ML
INJECTION, SOLUTION INTRAVENOUS CONTINUOUS
Status: ACTIVE | OUTPATIENT
Start: 2022-07-06

## 2022-07-06 RX ORDER — FENTANYL CITRATE 50 UG/ML
INJECTION, SOLUTION INTRAMUSCULAR; INTRAVENOUS
Status: DISCONTINUED | OUTPATIENT
Start: 2022-07-06 | End: 2022-07-06 | Stop reason: HOSPADM

## 2022-07-06 RX ORDER — LIDOCAINE HYDROCHLORIDE 10 MG/ML
INJECTION INFILTRATION; PERINEURAL
Status: DISCONTINUED | OUTPATIENT
Start: 2022-07-06 | End: 2022-07-06 | Stop reason: HOSPADM

## 2022-07-06 NOTE — PLAN OF CARE
Has met unit/department guidelines for discharge from each phase of the post procedure continuum. Leaving floor per w/c with RN and friend. AAO x3. Resp even and unlabored room air. No distress noted. Tolerating Po fluids without c/o nausea/vomiting. All personal belongings returned to pt.

## 2022-07-06 NOTE — H&P
"FOLLOW UP NOTE:     CHIEF COMPLAINT: neck pain    INTERVAL HISTORY OF PRESENT ILLNESS: Liliana Hermosillo is a 82 y.o. female who presents for C7-T1 cervical interlaminar epidural steroid injection. The patient denies of any significant changes in her health since her last appointment. The patient also denies of any changes in the character of her pain since her last appointment.     ROS:  Review of Systems   Constitutional: Negative for chills and fever.   HENT: Negative for sore throat.    Eyes: Negative for visual disturbance.   Respiratory: Negative for shortness of breath.    Cardiovascular: Negative for chest pain.   Gastrointestinal: Negative for nausea and vomiting.   Genitourinary: Negative for difficulty urinating.   Musculoskeletal: Positive for neck pain.   Skin: Negative for rash.   Allergic/Immunologic: Negative for immunocompromised state.   Neurological: Negative for syncope.   Hematological: Does not bruise/bleed easily.   Psychiatric/Behavioral: Negative for suicidal ideas.        MEDICAL, SURGICAL, FAMILY, SOCIAL HX: reviewed    MEDICATIONS/ALLERGIES: reviewed    PHYSICAL EXAM:    VITALS: Vitals reviewed.   Vitals:    07/06/22 0827   BP: (!) 147/69   Pulse: 74   Resp: 15   Temp: 97.4 °F (36.3 °C)   SpO2: 99%   Weight: 63.5 kg (140 lb)   Height: 5' 3" (1.6 m)       Physical Exam  Vitals and nursing note reviewed.   Constitutional:       Appearance: She is not diaphoretic.   HENT:      Head: Normocephalic and atraumatic.   Eyes:      General:         Right eye: No discharge.         Left eye: No discharge.      Conjunctiva/sclera: Conjunctivae normal.   Cardiovascular:      Rate and Rhythm: Normal rate.   Pulmonary:      Effort: Pulmonary effort is normal. No respiratory distress.      Breath sounds: Normal breath sounds.   Abdominal:      Palpations: Abdomen is soft.   Skin:     General: Skin is warm and dry.      Findings: No rash.   Neurological:      Mental Status: She is alert and oriented to " person, place, and time.   Psychiatric:         Mood and Affect: Mood and affect normal.         Cognition and Memory: Memory normal.         Judgment: Judgment normal.          EXTREMITIES:    Gen: No cyanosis, edema, varicosities, or tenderness to palpation BLE   Skin: Warm, pink, dry, no rashes, no lesions BLE   Strength: 5/5 motor strength BLE   ROM: hips, knees and ankles without pain or instability.     NEUROLOGICAL:    Gen: No clonus or spasticity.   Gait: Normal without antalgic lean   DTR's: 2+ in bilateral patellar, and ankle   BABINSKI: Absent bilaterally  Sensory: Intact to light touch and proprioception BLE    ASSESSMENT: Liliana Hermosillo is a 82 y.o. female who presents for C7-T1 cervical interlaminar epidural steroid injection.     PLAN:  1. Proceed with C7-T1 cervical interlaminar epidural steroid injection as previously discussed.    This patient has been cleared for surgery in an ambulatory surgical facility    ASA 3,  Mallampatti Score 3  No history of anesthetic complications  Plan for RN IV sedation    Kobe Rodríguez MD  Pain Management

## 2022-07-06 NOTE — OP NOTE
PROCEDURE DATE: 7/6/2022    PROCEDURE: C7-T1 cervical interlaminar epidural steroid injection under utilizing fluoroscopy.    DIAGNOSIS: Cervical Degenerative Disc Diease; Cervical Radiculitis  POSTOP DIAGNOSIS: SAME    PHYSICIAN: Kobe Rodríguez MD    MEDICATIONS INJECTED:  Depo-medrol 80 mg followed by a slow injection of 4 mL sterile, preservative-free normal saline.    LOCAL ANESTHETIC USED: Lidocaine 1%, 4 ml.    SEDATION MEDICATIONS: RN IV sedation    COMPLICATIONS:  none    ESTIMATED BLOOD LOSS: none    TECHNIQUE:  A time-out was taken to identify patient and procedure prior to starting the procedure.  With the patient laying in a prone position with the neck in a mid-flexed forward position, the area was prepped and draped in the usual sterile fashion using ChloraPrep and a fenestrated drape.  The area was determined under AP fluoroscopic guidance.  Local anesthetic was given using a 25-gauge 1.5 inch needle by raising a wheal and then infiltrating ventrally.  A 3.5 inch 20-gauge Touhy needle was introduced under fluoroscopic guidance to meet the lamina of C7.  The needle was then hinged under the lamina then advanced using loss of resistance technique.  Once the tip of the needle was in the desired position, the 2 mL contrast dye Omnipaque was injected to determine placement and no uptake.  The steroid was then injected slowly followed by a slow injection of 4 mL of the sterile preservative-free normal saline.  The patient tolerated the procedure well.    The patient was monitored after the procedure and was given post-procedure and discharge instructions to follow at home. The patient was discharged in a stable condition.

## 2022-07-06 NOTE — DISCHARGE INSTRUCTIONS
[phone]  [facility]   [phone]  [facility]      [phone]

## 2022-07-06 NOTE — DISCHARGE SUMMARY
[facility]  Discharge Note  Short Stay    Procedure(s) (LRB):  Injection, Steroid, Epidural NAIDA C7-T1 (N/A)    OUTCOME: Patient tolerated treatment/procedure well without complication and is now ready for discharge.    DISPOSITION: Home or Self Care    FINAL DIAGNOSIS:  Degenerative disc disease, cervical    FOLLOWUP: In clinic    DISCHARGE INSTRUCTIONS:    Discharge Procedure Orders   Diet general     Call MD for:  temperature >100.4     Call MD for:  persistent nausea and vomiting     Call MD for:  severe uncontrolled pain     Call MD for:  difficulty breathing, headache or visual disturbances     Call MD for:  redness, tenderness, or signs of infection (pain, swelling, redness, odor or green/yellow discharge around incision site)     Call MD for:  hives     Call MD for:  persistent dizziness or light-headedness     Call MD for:  extreme fatigue        TIME SPENT ON DISCHARGE: 30 minutes

## 2022-07-09 ENCOUNTER — NURSE TRIAGE (OUTPATIENT)
Dept: ADMINISTRATIVE | Facility: CLINIC | Age: 82
End: 2022-07-09
Payer: MEDICARE

## 2022-07-09 NOTE — TELEPHONE ENCOUNTER
"Epidural Wednesday. Nausea that evening. Called a friend that's a retired nurse and was told to take something for nausea.  Pt had zofran from 2018. Nausea resolved and slept.   Could hardly talk upon waking up on Thursday, felt as though her voice was strained. Vision was blurry and "didn't feel right".   Yesterday was working on paperwork and heart started jumping-"I didn't think I was gonna make it to 81"- HR was 75- happened 3-4x, vision blurry-difficulty reading papers at the time, began perspiring, nausea. No CONNIE. Took another zofran and nausea resolved. All other symptoms resolved, but states "it took awhile".   Still nauseous today. Took 2 nexium. Not resolving. Has not taken any of the zofran.  Took BP 3x, but unable to provide all readings. 141 SBP, 129 SBP. Machine indicated that BP was not high.   Had epidural 4 months ago and did not experience any of these symptoms.   Had routine visit with cardiologist recently, [facility], no record in EMR, and was told her heart was great.  No MD on call for pain management. Attempt to connect to MD Rodríguez cell phone . Left  for call back and further advice. No call back received.    Explained to pt that she should go to the ED for evaluation. Pt VU, States she will attempt to find a friend to drive her.     Reason for Disposition   Nursing judgment or information in reference    Protocols used: NO GUIDELINE [id number]      "

## 2022-07-09 NOTE — TELEPHONE ENCOUNTER
Reason for Disposition   Unable to walk, or can only walk with assistance (e.g., requires support)    Additional Information   Negative: Shock suspected (e.g., cold/pale/clammy skin, too weak to stand, low BP, rapid pulse)   Negative: Sounds like a life-threatening emergency to the triager   Negative: [1] Nausea or vomiting AND [2] pregnancy < 20 weeks   Negative: Menstrual Period - Missed or Late (i.e., pregnancy suspected)   Negative: Heat exhaustion suspected (i.e., dehydration from heat exposure)   Negative: Motion sickness suspected (i.e., nausea with car, plane, boat, or train travel)   Negative: Anxiety or stress suspected (i.e., nausea with anxiety attacks or stressful situations)   Negative: Traumatic Brain Injury (TBI) suspected   Negative: Nausea (or Vomiting) in a cancer patient who is currently (or recently) receiving chemotherapy or radiation therapy, or cancer patient who has metastatic or end-stage cancer and is receiving palliative care   Negative: Vomiting occurs   Negative: Other symptom is present, see that guideline.  (e.g., chest pain, headache, dizziness, abdominal pain, colds, sore throat, etc.).    Protocols used: NAUSEA-A-AH   pt called re speaking with on call for [place]. Pt notified no one on call. pt had epidural last week. Pt admits to feeling sick to stomach ever since. pt states had Zofran and took that and still sick. very nausea. shaky sweaty. BG= not checked. no CP, no SOB,. Pt states her heart was lifting out of chest yest. BP normal. O2 sat 94% but has on artificial nails. sl HA. lightheaded from Zofran. rec ED. call EMS if no ride. Pt agrees. call back with questions.

## 2022-07-11 ENCOUNTER — TELEPHONE (OUTPATIENT)
Dept: PAIN MEDICINE | Facility: CLINIC | Age: 82
End: 2022-07-11
Payer: MEDICARE

## 2022-07-11 NOTE — TELEPHONE ENCOUNTER
Spoke with pt advised of   Trans message. Pt is upset that there is no Dr On call. She was very upset about this.

## 2022-07-11 NOTE — TELEPHONE ENCOUNTER
"MD Bella Church LPN  Caller: Unspecified (2 days ago, 11:59 AM)  Just reviewed the saved images from her most recent epidural steroid injection, her procedure was uneventful from my end. That being said, she should present to the ED if the symptoms she reported is still persisting. Thank you.             Previous Messages          Patient Calls    MD Paddy Church 7 minutes ago (7:52 AM)         Just reviewed the saved images from her most recent epidural steroid injection, her procedure was uneventful from my end. That being said, she should present to the ED if the symptoms she reported is still persisting. Thank you.     Message text       You  Kobe Rodríguez MD 10 minutes ago (7:49 AM)     TC    Any advice?    Message text       REBA Longo Staff; Kirby Wayne Staff 2 days ago     NG    Please see triage note and follow up with pt. Thank you, nurses on call, REBA Peace    Routing comment      Nata Banks RN 2 days ago     NG       Epidural Wednesday. Nausea that evening. Called a friend that's a retired nurse and was told to take something for nausea.  Pt had zofran from 2018. Nausea resolved and slept.   Could hardly talk upon waking up on Thursday, felt as though her voice was strained. Vision was blurry and "didn't feel right".   Yesterday was working on paperwork and heart started jumping-"I didn't think I was gonna make it to 81"- HR was 75- happened 3-4x, vision blurry-difficulty reading papers at the time, began perspiring, nausea. No CONNIE. Took another zofran and nausea resolved. All other symptoms resolved, but states "it took awhile".   Still nauseous today. Took 2 nexium. Not resolving. Has not taken any of the zofran.  Took BP 3x, but unable to provide all readings. 141 SBP, 129 SBP. Machine indicated that BP was not high.   Had epidural 4 months ago and did not experience any of these symptoms.   Had routine visit with " cardiologist recently, [facility], no record in EMR, and was told her heart was great.  No MD on call for pain management. Attempt to connect to MD Rodríguez cell phone . Left VM for call back and further advice. No call back received.    Explained to pt that she should go to the ED for evaluation. Pt VU, States she will attempt to find a friend to drive her.      Reason for Disposition   Nursing judgment or information in reference

## 2022-08-03 ENCOUNTER — TELEPHONE (OUTPATIENT)
Dept: FAMILY MEDICINE | Facility: CLINIC | Age: 82
End: 2022-08-03
Payer: MEDICARE

## 2022-08-03 NOTE — TELEPHONE ENCOUNTER
----- Message from Eve Anton sent at 8/3/2022  4:31 PM CDT -----  .Type:  Same Day Appointment Request     Caller is requesting an appointment for tomorrow Pt thinks she may be diabetic she gets so weak she can't stand she had to be helped to her car she wants to be seen in the morning for lab work please reach out to pt she will explain further       Best Call Back Number: [phone]    Additional Information: None

## 2022-08-03 NOTE — TELEPHONE ENCOUNTER
Pt thinks she may be diabetic she gets so weak she can't stand she had to be helped to her car she wants to be seen tomorrow. Appointment scheduled.

## 2022-08-04 ENCOUNTER — OFFICE VISIT (OUTPATIENT)
Dept: FAMILY MEDICINE | Facility: CLINIC | Age: 82
End: 2022-08-04
Payer: MEDICARE

## 2022-08-04 VITALS
BODY MASS INDEX: 25.18 KG/M2 | HEART RATE: 76 BPM | HEIGHT: 63 IN | SYSTOLIC BLOOD PRESSURE: 139 MMHG | TEMPERATURE: 98 F | DIASTOLIC BLOOD PRESSURE: 79 MMHG | OXYGEN SATURATION: 97 % | WEIGHT: 142.13 LBS | RESPIRATION RATE: 19 BRPM

## 2022-08-04 DIAGNOSIS — R53.1 WEAKNESS: Primary | ICD-10-CM

## 2022-08-04 DIAGNOSIS — M60.9 MYOSITIS, UNSPECIFIED MYOSITIS TYPE, UNSPECIFIED SITE: ICD-10-CM

## 2022-08-04 DIAGNOSIS — G47.00 INSOMNIA, UNSPECIFIED TYPE: ICD-10-CM

## 2022-08-04 DIAGNOSIS — R42 DIZZINESS: ICD-10-CM

## 2022-08-04 DIAGNOSIS — F41.9 ANXIETY: ICD-10-CM

## 2022-08-04 DIAGNOSIS — E16.2 HYPOGLYCEMIA: ICD-10-CM

## 2022-08-04 DIAGNOSIS — E83.10 DISORDER OF IRON METABOLISM, UNSPECIFIED: ICD-10-CM

## 2022-08-04 PROCEDURE — 99214 PR OFFICE/OUTPT VISIT, EST, LEVL IV, 30-39 MIN: ICD-10-PCS | Mod: S$PBB,,,

## 2022-08-04 PROCEDURE — 99999 PR PBB SHADOW E&M-EST. PATIENT-LVL III: ICD-10-PCS | Mod: PBBFAC,,,

## 2022-08-04 PROCEDURE — 99213 OFFICE O/P EST LOW 20 MIN: CPT | Mod: PBBFAC,PN

## 2022-08-04 PROCEDURE — 99999 PR PBB SHADOW E&M-EST. PATIENT-LVL III: CPT | Mod: PBBFAC,,,

## 2022-08-04 PROCEDURE — 99214 OFFICE O/P EST MOD 30 MIN: CPT | Mod: S$PBB,,,

## 2022-08-04 RX ORDER — HYDROXYZINE PAMOATE 25 MG/1
25 CAPSULE ORAL EVERY 8 HOURS PRN
Qty: 90 CAPSULE | Refills: 0 | Status: SHIPPED | OUTPATIENT
Start: 2022-08-04 | End: 2022-09-20 | Stop reason: SDUPTHER

## 2022-08-04 RX ORDER — HYDROXYZINE PAMOATE 25 MG/1
25 CAPSULE ORAL EVERY 8 HOURS PRN
Qty: 90 CAPSULE | Refills: 0 | Status: SHIPPED | OUTPATIENT
Start: 2022-08-04 | End: 2022-08-04 | Stop reason: CLARIF

## 2022-08-04 RX ORDER — ZOLPIDEM TARTRATE 5 MG/1
5 TABLET ORAL NIGHTLY PRN
Qty: 30 TABLET | Refills: 0 | Status: SHIPPED | OUTPATIENT
Start: 2022-08-04 | End: 2022-08-04 | Stop reason: CLARIF

## 2022-08-04 RX ORDER — ZOLPIDEM TARTRATE 5 MG/1
5 TABLET ORAL NIGHTLY PRN
Qty: 30 TABLET | Refills: 0 | Status: SHIPPED | OUTPATIENT
Start: 2022-08-04 | End: 2023-10-23 | Stop reason: SDUPTHER

## 2022-08-04 NOTE — PROGRESS NOTES
"[facility] - Clinic Visit Note    Subjective:           Chief Complaint: Extremity Weakness      HPI  Liliana Hermosillo is a 82 y.o. female presenting to clinic today with c/o extreme, intermittent weakness and dizziness. She has attributed it to her blood sugar being low, she admits to being told she is hypoglycemic before. She claims to eat or drink something with sugar and it makes her feel better. It is becoming more frequent and has made her scared to drive or be alone.       Review of Systems   Constitutional: Positive for activity change and fatigue. Negative for chills and fever.   HENT: Negative for congestion.    Respiratory: Negative for cough and shortness of breath.    Cardiovascular: Negative for chest pain and leg swelling.   Gastrointestinal: Negative for abdominal pain, constipation, diarrhea, nausea and vomiting.   Genitourinary: Negative for difficulty urinating.   Neurological: Positive for dizziness, weakness and light-headedness. Negative for headaches.   All other systems reviewed and are negative.          Objective:      /79 (BP Location: Left arm, Patient Position: Sitting, BP Method: Medium (Manual))   Pulse 76   Temp 98.3 °F (36.8 °C) (Oral)   Resp 19   Ht 5' 3" (1.6 m)   Wt 64.5 kg (142 lb 1.6 oz)   SpO2 97%   BMI 25.17 kg/m²   Physical Exam  Vitals and nursing note reviewed.   Constitutional:       General: She is not in acute distress.     Appearance: Normal appearance. She is not ill-appearing or diaphoretic.   HENT:      Head: Normocephalic and atraumatic.      Nose: Nose normal.   Eyes:      Pupils: Pupils are equal, round, and reactive to light.   Cardiovascular:      Rate and Rhythm: Normal rate and regular rhythm.      Heart sounds: Normal heart sounds.   Pulmonary:      Effort: Pulmonary effort is normal.      Breath sounds: Normal breath sounds.   Abdominal:      General: Abdomen is flat.   Musculoskeletal:         General: Normal range of motion.      Cervical " back: Normal range of motion.   Skin:     General: Skin is warm and dry.   Neurological:      Mental Status: She is alert and oriented to person, place, and time.   Psychiatric:         Mood and Affect: Mood normal.         Behavior: Behavior normal.         Thought Content: Thought content normal.         Judgment: Judgment normal.         Assessment and Plan:       1. Weakness  -     CBC Auto Differential  -     Comprehensive Metabolic Panel  -     Hemoglobin A1C  -     Vitamin D  -     hydrOXYzine pamoate (VISTARIL) 25 MG Cap    2. Anxiety  -     CBC Auto Differential  -     Comprehensive Metabolic Panel  -     Hemoglobin A1C  -     Vitamin D  -     hydrOXYzine pamoate (VISTARIL) 25 MG Cap    3. Dizziness  -     CBC Auto Differential  -     Comprehensive Metabolic Panel  -     Hemoglobin A1C  -     Vitamin D  -     hydrOXYzine pamoate (VISTARIL) 25 MG Cap    4. Disorder of iron metabolism, unspecified   -     Hemoglobin A1C    5. Hypoglycemia  -     CBC Auto Differential  -     Comprehensive Metabolic Panel  -     Hemoglobin A1C  -     Vitamin D  -     hydrOXYzine pamoate (VISTARIL) 25 MG Cap  -     Glucose Tolerance 2 Hour    6. Myositis, unspecified myositis type, unspecified site   -     Vitamin D    7. Insomnia, unspecified type  -     zolpidem (AMBIEN) 5 MG Tab        PLAN: Applies to all diagnosis and orders listed above.  - fasting labs ordered  - eat frequent small, high protein meals  - carry hard candies to eat if episodes occur.   - Follow up in about 2 weeks (around 8/18/2022) for lab review.     Discussed with patient the plan of care. Medications reviewed. Medication side effects discussed. Patient has no questions or concerns at this time. Reviewed, monitored, evaluated, and assessed chronic conditions as outlined above. Reviewed family, medical, surgical, and social history. Reviewed and discussed labs and imaging from the last 3 months.  Informed patient to please notify me with any questions or  concerns at anytime.    Thank you,  APRIL Cespedes  Family Medicine  [facility]

## 2022-08-05 ENCOUNTER — TELEPHONE (OUTPATIENT)
Dept: PHYSICAL MEDICINE AND REHAB | Facility: CLINIC | Age: 82
End: 2022-08-05
Payer: MEDICARE

## 2022-08-05 NOTE — TELEPHONE ENCOUNTER
Spoke with patient advised that Dr Conner said he would rather have her come in and go over her options in clinic where he can discuss things with her once she is feeling better. Pt states she will call back once she is better in order to make an appt

## 2022-08-05 NOTE — TELEPHONE ENCOUNTER
I am considering a couple of different options depending on her current symptoms.  It would be best if she follow-up in the office when she feels comfortable doing so

## 2022-08-05 NOTE — TELEPHONE ENCOUNTER
----- Message from Stacey M Lefort sent at 8/5/2022  9:03 AM CDT -----  Pt is requesting a callback at [phone].  Thank you.

## 2022-08-05 NOTE — TELEPHONE ENCOUNTER
Pt states her LAZARO did not help as much as the first one. She has good days and bad days. She was exposed to covid and did not want to come in on Monday to expose anyone. So she  Wanted to let you know her results of the epidural. She would like to know what is the next step?

## 2022-08-08 ENCOUNTER — OFFICE VISIT (OUTPATIENT)
Dept: GASTROENTEROLOGY | Facility: CLINIC | Age: 82
End: 2022-08-08
Payer: MEDICARE

## 2022-08-08 VITALS
DIASTOLIC BLOOD PRESSURE: 76 MMHG | BODY MASS INDEX: 24.8 KG/M2 | HEART RATE: 82 BPM | SYSTOLIC BLOOD PRESSURE: 122 MMHG | TEMPERATURE: 99 F | WEIGHT: 140 LBS | OXYGEN SATURATION: 100 % | HEIGHT: 63 IN

## 2022-08-08 DIAGNOSIS — K21.9 GASTROESOPHAGEAL REFLUX DISEASE WITHOUT ESOPHAGITIS: ICD-10-CM

## 2022-08-08 DIAGNOSIS — R10.9 ABDOMINAL PAIN, UNSPECIFIED ABDOMINAL LOCATION: Primary | ICD-10-CM

## 2022-08-08 DIAGNOSIS — K59.00 CONSTIPATION, UNSPECIFIED CONSTIPATION TYPE: ICD-10-CM

## 2022-08-08 DIAGNOSIS — K57.90 DIVERTICULOSIS: ICD-10-CM

## 2022-08-08 PROCEDURE — 99999 PR PBB SHADOW E&M-EST. PATIENT-LVL IV: ICD-10-PCS | Mod: PBBFAC,,, | Performed by: INTERNAL MEDICINE

## 2022-08-08 PROCEDURE — 99214 OFFICE O/P EST MOD 30 MIN: CPT | Mod: S$PBB,,, | Performed by: INTERNAL MEDICINE

## 2022-08-08 PROCEDURE — 99214 PR OFFICE/OUTPT VISIT, EST, LEVL IV, 30-39 MIN: ICD-10-PCS | Mod: S$PBB,,, | Performed by: INTERNAL MEDICINE

## 2022-08-08 PROCEDURE — 99214 OFFICE O/P EST MOD 30 MIN: CPT | Mod: PBBFAC,PN | Performed by: INTERNAL MEDICINE

## 2022-08-08 PROCEDURE — 99999 PR PBB SHADOW E&M-EST. PATIENT-LVL IV: CPT | Mod: PBBFAC,,, | Performed by: INTERNAL MEDICINE

## 2022-08-08 RX ORDER — PROMETHAZINE HYDROCHLORIDE 25 MG/1
25 TABLET ORAL EVERY 6 HOURS PRN
Qty: 30 TABLET | Refills: 0 | Status: SHIPPED | OUTPATIENT
Start: 2022-08-08 | End: 2022-11-02

## 2022-08-08 NOTE — PATIENT INSTRUCTIONS
She will continue her reflux regimen and current medications.  She is scheduled for ultrasound an [facility] GI x-rays.  She follows up with other physicians.  She will make a food diary and avoid the offending foods.

## 2022-08-08 NOTE — PROGRESS NOTES
Subjective:       Patient ID: Liliana Hermosillo is a 82 y.o. female.    Chief Complaint: GI Problem        Office Visit    10/31/2019  [facility]    Lul Orr MD      Gastroenterology Family history of colon cancer +4 more      Dx Follow-up; Referred by Aaareferral Self      Reason for Visit      Progress Notes  Lul Orr MD (Physician)   Gastroenterology       Subjective:      Patient ID: Liliana Hermosillo is a 79 y.o. female.     Chief Complaint: Follow-up     His she takes the MiraLax in the fiber she has daily bowel movements.  She tried the probiotics but had some abdominal cramping.  She will avoid medications over-the-counter medications that cause side effects.  She is having daily bowel movements.  She denies GI bleeding.  She has a family history of colon cancer and colon polyps.  With her last colonoscopy she was told by her gastroenterologist that her colon mucosa was to thin and never have another it colonoscopy.  The stool studies were negative for blood.  The labs were essentially normal. She denies hematemesis hematochezia jaundice or bleeding.  She has a history of reflux esophagitis and states that she cannot drink coffee or  chocolate in the evenings but can do so in the afternoons.  She will make a food diary and avoid the off ending foods.  She continues her vitamins and minerals she is usually doing noted daily basis.        Allergies:  Review of patient's allergies indicates:  No Known Allergies     Medications:     Current Outpatient Medications:     esomeprazole (NEXIUM) 40 MG capsule, Nexium 40 mg capsule,delayed release  Take 0.5 capsules every day by oral route., Disp: , Rfl:     phytonadione, vit K1, (MEPHYTON ORAL), vitamin K, Disp: , Rfl:     polyethylene glycol (GLYCOLAX) 17 gram PwPk, Take 17 g by mouth 2 (two) times daily., Disp: , Rfl:     rosuvastatin (CRESTOR) 20 MG tablet, rosuvastatin 20 mg tablet  Take 1 tablet twicw weekly by oral route., Disp: ,  Rfl:     triamterene-hydrochlorothiazide 75-50 mg (MAXZIDE) 75-50 mg per tablet, , Disp: , Rfl:     valACYclovir (VALTREX) 1000 MG tablet, , Disp: , Rfl:        Past Medical History:  Diagnosis Date   Atypical ductal hyperplasia, breast     Chronic constipation     Diverticulitis     Diverticulosis     Edema     Hypertension     Ovarian cyst            Past Surgical History:  Procedure Laterality Date   ANKLE ARTHROSCOPY W/ OPEN REPAIR       BREAST BIOPSY Left     CHOLECYSTECTOMY       COLONOSCOPY       HYSTERECTOMY       KNEE SURGERY       UPPER GASTROINTESTINAL ENDOSCOPY               Review of Systems   Respiratory:        She has never used tobacco.  She denies cardiopulmonary symptoms.  She denies chronic cough or sputum production.  She denies hemoptysis she denies dyspnea on exertion.   Cardiovascular:        Her hyperlipidemia is well controlled on the Crestor twice week.  She denies exertional chest pain or rhythm disturbances.   Gastrointestinal: Positive for constipation. Negative for abdominal distention, abdominal pain, anal bleeding, blood in stool, diarrhea and nausea.        She has a history of diverticulosis.  Recent CT scan showed diverticulosis and large amount of stool.  She has had a cholecystectomy.  She denies dysphagia hematemesis hematochezia jaundice or bleeding.   Musculoskeletal: Positive for arthralgias.         Objective:  Physical Exam   Constitutional: She is oriented to person, place, and time. She appears well-developed and well-nourished.   Well-nourished well-hydrated thin nonicteric white female   HENT:   Head: Normocephalic.   Eyes: Pupils are equal, round, and reactive to light. EOM are normal.   Neck: Normal range of motion. Neck supple. No tracheal deviation present. No thyromegaly present.   Cardiovascular: Normal rate, regular rhythm and normal heart sounds.   Pulmonary/Chest: Effort normal and breath sounds normal.   Abdominal: Soft. Bowel sounds are  normal. She exhibits no distension and no mass. There is no tenderness. There is no rebound and no guarding. No hernia.   Musculoskeletal: Normal range of motion.   She can ambulate normally.  She can go from the sitting to standing position without difficulty.   Lymphadenopathy:     She has no cervical adenopathy.   Neurological: She is alert and oriented to person, place, and time. No cranial nerve deficit.   Skin: Skin is warm and dry.   Psychiatric: She has a normal mood and affect. Her behavior is normal.   Vitals reviewed.           Plan:      Family history of colon cancer     Gastroesophageal reflux disease, esophagitis presence not specified     Dyslipidemia     Constipation, unspecified constipation type     Diverticulosis      She will continue her reflux regimen current medications.  She continues her bowel program to avoid constipation. She continues her usual activities.  And vitamins and minerals.              Other Notes    All notes        Instructions        Follow up in about 6 months (around 4/30/2020).    She will continue the MiraLax and fiber.  She continues the Crestor twice a week.  Her lipids are well controlled.  The stool was negative for blood.  The other labs were normal.  She continues her vitamins minerals and usual activities.           After Visit Summary (Printed 10/31/2019)      Additional Documentation    SmartForms:  [facility] AMB - FALL RISK      Encounter Info: Billing Info,    History,    Allergies,    Detailed Report      Not recorded      All Charges for This Encounter    Code Description Service Date Service Provider Modifiers Qty  24268 AL OFFICE/OUTPT VISIT,EST,LEVL III 10/31/2019 Lul Orr MD S$PBB 1  [id number]  E&M-EST. PATIENT-LVL II 10/31/2019 Lul Orr MD PBBFAC, PN 1  [id number] AL PBB SHADOW E&M-EST. PATIENT-LVL II 10/31/2019 Lul Orr MD PBBFAC 1    Level of Service    Level of Service  AL OFFICE/OUTPT VISIT,EST,LEVL III (49326)    BestPractice  Advisories    Click to view BestPractice Advisory history    AVS Reports    Date/Time Report Action User  10/31/2019  1:59 PM After Visit Summary Printed Meli Balderas RN    Encounter-Level Documents on 10/31/2019:    After Visit Summary - Document on 10/31/2019 1:59 PM by Meli Balderas RN: After Visit Summary      Orders Placed    None      Medication Changes        None      Medication List    Visit Diagnoses        Family history of colon cancer        Gastroesophageal reflux disease, esophagitis presence not specified        Dyslipidemia        Constipation, unspecified constipation type        Diverticulosis      Problem List      She has had a cholecystectomy and has gastroesophageal reflux.  She is on the Nexium.  She has a history of diverticulosis.  She has had occasional bowel irregularity.  She denies hematemesis hematochezia jaundice or bleeding.  She never wants another colonoscopy.  Approximately 2 weeks ago she developed increasing abdominal pain with pyrosis and dyspepsia nausea.  In the past she took the Protonix which she believes because the side effects.  She has been on the Nexium 20 mg.  Several years ago upper endoscopy in GI evaluation revealed a hiatal hernia and gastroesophageal reflux.  She was told to take the OTC Nexium.  She has tried to avoid the fried and  the fatty foods.  She has tried to be more physically active.  Possibly she states she has been too physically active with her exercises.  She has had epigastric pain with pyrosis and dyspepsia.  She has had left upper quadrant discomfort without radiation.  She has not consumed alcohol in 2 weeks.  She denies dysphagia aspiration hematemesis hematochezia jaundice or bleeding.  She is extremely depressed and anxious.  She believes that the anxiety is influential.  She lives alone.  She nourished her ill  for 5 years and then she had a male friend who dropped dead in the room in front of me, she has a history of  diverticulosis and daily daily bowel movements.  She has tried to avoid the offending foods particularly the fried in the fatty foods.  She states the Zofran caused abdominal pain with nausea although she can tolerate the Phenergan.  She has had nausea without vomiting but cannot pinpoint with a precipitating factor.      Allergies:  Review of patient's allergies indicates:   Allergen Reactions    Gabapentin Swelling     Swelling of face    Pantoprazole     Monosodium glutamate Palpitations and Rash       Medications:    Current Outpatient Medications:     clobetasoL (TEMOVATE) 0.05 % cream, Apply topically every evening. 6-12 weeks, Disp: 60 g, Rfl: 2    esomeprazole (NEXIUM) 20 MG capsule, Take 20 mg by mouth before breakfast., Disp: , Rfl:     hydroCHLOROthiazide (MICROZIDE) 12.5 mg capsule, Take 1 capsule (12.5 mg total) by mouth once daily., Disp: 90 capsule, Rfl: 3    hydrOXYzine pamoate (VISTARIL) 25 MG Cap, Take 1 capsule (25 mg total) by mouth every 8 (eight) hours as needed (anxiety)., Disp: 90 capsule, Rfl: 0    ibuprofen (ADVIL,MOTRIN) 200 MG tablet, Take 400 mg by mouth every 6 (six) hours as needed for Pain., Disp: , Rfl:     melatonin 1 mg Tab, Take by mouth nightly as needed., Disp: , Rfl:     multivitamin (THERAGRAN) per tablet, Take 1 tablet by mouth once daily., Disp: , Rfl:     ondansetron (ZOFRAN-ODT) 8 MG TbDL, Take 1 tablet (8 mg total) by mouth every 8 (eight) hours as needed (nausea)., Disp: 30 tablet, Rfl: 0    rosuvastatin (CRESTOR) 20 MG tablet, Take 1 tablet (20 mg total) by mouth once daily. (Patient taking differently: Take 20 mg by mouth twice a week.), Disp: 90 tablet, Rfl: 1    triamterene-hydrochlorothiazide 75-50 mg (MAXZIDE) 75-50 mg per tablet, Take 0.5 tablets by mouth once daily., Disp: , Rfl:     TURMERIC ORAL, Take 1 tablet by mouth 2 (two) times a day., Disp: , Rfl:     XIIDRA 5 % Dpet, , Disp: , Rfl:     zolpidem (AMBIEN) 5 MG Tab, Take 1 tablet (5 mg  total) by mouth nightly as needed (insomnia)., Disp: 30 tablet, Rfl: 0    azithromycin (Z-JENNIFER) 250 MG tablet, Take two tablets on day 1, then 1 tablet on days 2-5. (Patient not taking: Reported on 8/8/2022), Disp: 6 tablet, Rfl: 0    promethazine (PHENERGAN) 25 MG tablet, Take 1 tablet (25 mg total) by mouth every 6 (six) hours as needed for Nausea., Disp: 30 tablet, Rfl: 0  No current facility-administered medications for this visit.    Facility-Administered Medications Ordered in Other Visits:     lactated ringers infusion, , Intravenous, Continuous, Kobe Rodríguez MD    Past Medical History:   Diagnosis Date    Atypical ductal hyperplasia, breast     Chronic constipation     Diverticulitis     Diverticulosis     Edema     Hypertension     Ovarian cyst        Past Surgical History:   Procedure Laterality Date    ANKLE ARTHROSCOPY W/ OPEN REPAIR      APPENDECTOMY  1972    BREAST BIOPSY Left     CHOLECYSTECTOMY      COLONOSCOPY      EPIDURAL STEROID INJECTION N/A 7/6/2022    Procedure: Injection, Steroid, Epidural NAIDA C7-T1;  Surgeon: Kobe Rodríguez MD;  Location: [facility] OR;  Service: Pain Management;  Laterality: N/A;  Medicare    EPIDURAL STEROID INJECTION INTO CERVICAL SPINE N/A 3/30/2022    Procedure: Injection-steroid-epidural-cervical;  Surgeon: Kobe Rodríguez MD;  Location: [facility] OR;  Service: Pain Management;  Laterality: N/A;  c7-t1    EYE SURGERY  2012    HYSTERECTOMY      KNEE SURGERY      TONSILLECTOMY  1948    UPPER GASTROINTESTINAL ENDOSCOPY           Review of Systems   Constitutional: Negative for appetite change, fever and unexpected weight change.   HENT: Negative for trouble swallowing.         No jaundice.   Respiratory: Negative for cough, shortness of breath and wheezing.         She is a former cigarette smoker.  Currently she does not use tobacco.  She has not consumed alcohol in 2 weeks.  She denies dysphagia aspiration hemoptysis chronic cough chronic sputum  production or dyspnea   Cardiovascular: Negative for chest pain.        She denies exertional chest pain.  She denies rhythm disturbance.   Gastrointestinal: Positive for abdominal pain, constipation and nausea. Negative for abdominal distention, anal bleeding, blood in stool, diarrhea, rectal pain and vomiting.        She has history of diverticulosis.  Her constipation has responded to the current medications also the diet with increased fiber and vegetables.   Musculoskeletal: Negative for back pain and neck pain.   Skin: Positive for color change. Negative for pallor and rash.   Neurological: Negative for dizziness, seizures, syncope, speech difficulty, weakness and numbness.   Hematological: Negative for adenopathy.   Psychiatric/Behavioral: Negative for confusion.        She has had depression and anxiety and her PCP is addressing this issue.       Objective:      Physical Exam  Vitals reviewed.   Constitutional:       Appearance: She is well-developed.      Comments: Well-nourished well-hydrated afebrile nonicteric white female.  She is sitting comfortably in the chair.  She is breathing normally.  She is normocephalic.  Pupils are normal.  She appears to be oriented x3 and can relate her history and answer questions appropriately.   HENT:      Head: Normocephalic.   Eyes:      Pupils: Pupils are equal, round, and reactive to light.   Neck:      Thyroid: No thyromegaly.      Trachea: No tracheal deviation.   Cardiovascular:      Rate and Rhythm: Normal rate and regular rhythm.      Heart sounds: Normal heart sounds.   Pulmonary:      Effort: Pulmonary effort is normal.      Breath sounds: Normal breath sounds.   Abdominal:      General: Bowel sounds are normal. There is no distension.      Palpations: Abdomen is soft. There is no mass.      Tenderness: There is abdominal tenderness. There is no guarding or rebound.      Hernia: No hernia is present.      Comments: The abdomen is soft with mild tenderness in  the epigastrium and right upper quadrant.  Organomegaly or masses are not detected.  Bowel sounds are normal.   Musculoskeletal:      Cervical back: Normal range of motion and neck supple.      Comments: She ambulates slowly.  She can go from the sitting to the standing positions slowly.   Lymphadenopathy:      Cervical: No cervical adenopathy.   Skin:     General: Skin is warm and dry.   Neurological:      Mental Status: She is alert and oriented to person, place, and time.      Cranial Nerves: No cranial nerve deficit.   Psychiatric:         Behavior: Behavior normal.           Plan:       Abdominal pain, unspecified abdominal location  -     promethazine (PHENERGAN) 25 MG tablet; Take 1 tablet (25 mg total) by mouth every 6 (six) hours as needed for Nausea.  Dispense: 30 tablet; Refill: 0    Gastroesophageal reflux disease without esophagitis    Constipation, unspecified constipation type    Diverticulosis    She will continue her reflux regimen.  She will make a food diary and avoid the offending foods especially the fried and the fatty foods.  She continues her nutritious diet takes her vitamins and minerals and also fiber supplements and increased vegetables in the diet to produced daily bowel movements.  Ultrasound and upper GI x-rays will be scheduled.  She follows up with her other physicians.  Labs are pending.

## 2022-08-09 ENCOUNTER — PATIENT MESSAGE (OUTPATIENT)
Dept: FAMILY MEDICINE | Facility: CLINIC | Age: 82
End: 2022-08-09
Payer: MEDICARE

## 2022-08-09 ENCOUNTER — LAB VISIT (OUTPATIENT)
Dept: LAB | Facility: HOSPITAL | Age: 82
End: 2022-08-09
Payer: MEDICARE

## 2022-08-09 DIAGNOSIS — R53.1 WEAKNESS: ICD-10-CM

## 2022-08-09 DIAGNOSIS — E83.10 DISORDER OF IRON METABOLISM, UNSPECIFIED: ICD-10-CM

## 2022-08-09 DIAGNOSIS — R42 DIZZINESS: ICD-10-CM

## 2022-08-09 DIAGNOSIS — F41.9 ANXIETY: ICD-10-CM

## 2022-08-09 DIAGNOSIS — M60.9 MYOSITIS, UNSPECIFIED MYOSITIS TYPE, UNSPECIFIED SITE: ICD-10-CM

## 2022-08-09 DIAGNOSIS — E16.2 HYPOGLYCEMIA: ICD-10-CM

## 2022-08-09 LAB
25(OH)D3+25(OH)D2 SERPL-MCNC: 63 NG/ML (ref 30–96)
ALBUMIN SERPL BCP-MCNC: 3.9 G/DL (ref 3.5–5.2)
ALP SERPL-CCNC: 53 U/L (ref 55–135)
ALT SERPL W/O P-5'-P-CCNC: 18 U/L (ref 10–44)
ANION GAP SERPL CALC-SCNC: 12 MMOL/L (ref 8–16)
AST SERPL-CCNC: 18 U/L (ref 10–40)
BASOPHILS # BLD AUTO: 0.03 K/UL (ref 0–0.2)
BASOPHILS NFR BLD: 0.6 % (ref 0–1.9)
BILIRUB SERPL-MCNC: 0.5 MG/DL (ref 0.1–1)
BUN SERPL-MCNC: 16 MG/DL (ref 8–23)
CALCIUM SERPL-MCNC: 9.5 MG/DL (ref 8.7–10.5)
CHLORIDE SERPL-SCNC: 105 MMOL/L (ref 95–110)
CO2 SERPL-SCNC: 27 MMOL/L (ref 23–29)
CREAT SERPL-MCNC: 0.9 MG/DL (ref 0.5–1.4)
DIFFERENTIAL METHOD: NORMAL
EOSINOPHIL # BLD AUTO: 0.2 K/UL (ref 0–0.5)
EOSINOPHIL NFR BLD: 3.8 % (ref 0–8)
ERYTHROCYTE [DISTWIDTH] IN BLOOD BY AUTOMATED COUNT: 12.2 % (ref 11.5–14.5)
EST. GFR  (NO RACE VARIABLE): >60 ML/MIN/1.73 M^2
ESTIMATED AVG GLUCOSE: 108 MG/DL (ref 68–131)
GLUCOSE SERPL-MCNC: 155 MG/DL
GLUCOSE SERPL-MCNC: 198 MG/DL
GLUCOSE SERPL-MCNC: 97 MG/DL (ref 70–110)
GLUCOSE SERPL-MCNC: 98 MG/DL (ref 70–110)
HBA1C MFR BLD: 5.4 % (ref 4–5.6)
HCT VFR BLD AUTO: 38.7 % (ref 37–48.5)
HGB BLD-MCNC: 12.7 G/DL (ref 12–16)
IMM GRANULOCYTES # BLD AUTO: 0.01 K/UL (ref 0–0.04)
IMM GRANULOCYTES NFR BLD AUTO: 0.2 % (ref 0–0.5)
LYMPHOCYTES # BLD AUTO: 1.8 K/UL (ref 1–4.8)
LYMPHOCYTES NFR BLD: 38.4 % (ref 18–48)
MCH RBC QN AUTO: 29.6 PG (ref 27–31)
MCHC RBC AUTO-ENTMCNC: 32.8 G/DL (ref 32–36)
MCV RBC AUTO: 90 FL (ref 82–98)
MONOCYTES # BLD AUTO: 0.4 K/UL (ref 0.3–1)
MONOCYTES NFR BLD: 8.2 % (ref 4–15)
NEUTROPHILS # BLD AUTO: 2.3 K/UL (ref 1.8–7.7)
NEUTROPHILS NFR BLD: 48.8 % (ref 38–73)
NRBC BLD-RTO: 0 /100 WBC
PLATELET # BLD AUTO: 182 K/UL (ref 150–450)
PMV BLD AUTO: 9.9 FL (ref 9.2–12.9)
POTASSIUM SERPL-SCNC: 3.7 MMOL/L (ref 3.5–5.1)
PROT SERPL-MCNC: 6.7 G/DL (ref 6–8.4)
RBC # BLD AUTO: 4.29 M/UL (ref 4–5.4)
SODIUM SERPL-SCNC: 144 MMOL/L (ref 136–145)
WBC # BLD AUTO: 4.76 K/UL (ref 3.9–12.7)

## 2022-08-09 PROCEDURE — 36415 COLL VENOUS BLD VENIPUNCTURE: CPT

## 2022-08-09 PROCEDURE — 85025 COMPLETE CBC W/AUTO DIFF WBC: CPT

## 2022-08-09 PROCEDURE — 80053 COMPREHEN METABOLIC PANEL: CPT

## 2022-08-09 PROCEDURE — 82306 VITAMIN D 25 HYDROXY: CPT

## 2022-08-09 PROCEDURE — 82951 GLUCOSE TOLERANCE TEST (GTT): CPT

## 2022-08-09 PROCEDURE — 83036 HEMOGLOBIN GLYCOSYLATED A1C: CPT

## 2022-08-12 ENCOUNTER — LAB VISIT (OUTPATIENT)
Dept: LAB | Facility: HOSPITAL | Age: 82
End: 2022-08-12
Attending: INTERNAL MEDICINE
Payer: MEDICARE

## 2022-08-12 DIAGNOSIS — K21.9 GASTROESOPHAGEAL REFLUX DISEASE WITHOUT ESOPHAGITIS: ICD-10-CM

## 2022-08-12 PROCEDURE — 87338 HPYLORI STOOL AG IA: CPT | Performed by: INTERNAL MEDICINE

## 2022-08-18 ENCOUNTER — HOSPITAL ENCOUNTER (OUTPATIENT)
Dept: RADIOLOGY | Facility: HOSPITAL | Age: 82
Discharge: HOME | End: 2022-08-18
Attending: INTERNAL MEDICINE
Payer: MEDICARE

## 2022-08-18 ENCOUNTER — OFFICE VISIT (OUTPATIENT)
Dept: FAMILY MEDICINE | Facility: CLINIC | Age: 82
End: 2022-08-18
Payer: MEDICARE

## 2022-08-18 VITALS
HEART RATE: 76 BPM | DIASTOLIC BLOOD PRESSURE: 72 MMHG | HEIGHT: 63 IN | SYSTOLIC BLOOD PRESSURE: 132 MMHG | OXYGEN SATURATION: 99 % | BODY MASS INDEX: 25.37 KG/M2 | WEIGHT: 143.19 LBS | RESPIRATION RATE: 18 BRPM

## 2022-08-18 DIAGNOSIS — R91.1 LUNG NODULE: ICD-10-CM

## 2022-08-18 DIAGNOSIS — E04.2 MULTINODULAR THYROID: Primary | ICD-10-CM

## 2022-08-18 DIAGNOSIS — R10.9 ABDOMINAL PAIN, UNSPECIFIED ABDOMINAL LOCATION: ICD-10-CM

## 2022-08-18 DIAGNOSIS — I10 HYPERTENSION, UNSPECIFIED TYPE: ICD-10-CM

## 2022-08-18 LAB
H PYLORI AG STL QL IA: NOT DETECTED
SPECIMEN SOURCE: NORMAL

## 2022-08-18 PROCEDURE — 99214 OFFICE O/P EST MOD 30 MIN: CPT | Mod: S$PBB,,,

## 2022-08-18 PROCEDURE — 76705 ECHO EXAM OF ABDOMEN: CPT | Mod: TC

## 2022-08-18 PROCEDURE — 99999 PR PBB SHADOW E&M-EST. PATIENT-LVL IV: CPT | Mod: PBBFAC,,,

## 2022-08-18 PROCEDURE — 76705 ECHO EXAM OF ABDOMEN: CPT | Mod: 26,,, | Performed by: RADIOLOGY

## 2022-08-18 PROCEDURE — 99214 OFFICE O/P EST MOD 30 MIN: CPT | Mod: PBBFAC,25,PN

## 2022-08-18 PROCEDURE — 76705 US ABDOMEN LIMITED: ICD-10-PCS | Mod: 26,,, | Performed by: RADIOLOGY

## 2022-08-18 PROCEDURE — 99999 PR PBB SHADOW E&M-EST. PATIENT-LVL IV: ICD-10-PCS | Mod: PBBFAC,,,

## 2022-08-18 PROCEDURE — 99214 PR OFFICE/OUTPT VISIT, EST, LEVL IV, 30-39 MIN: ICD-10-PCS | Mod: S$PBB,,,

## 2022-08-18 RX ORDER — HYDROCHLOROTHIAZIDE 25 MG/1
25 TABLET ORAL DAILY
Qty: 90 TABLET | Refills: 3 | Status: SHIPPED | OUTPATIENT
Start: 2022-08-18 | End: 2022-11-02

## 2022-08-18 NOTE — PROGRESS NOTES
"[facility] - Clinic Visit Note    Subjective:           Chief Complaint: Follow-up (Here for test results /Wants a fluid pill/Was given anxiety pill that she was supposed to cut in half and it was a capsule )      HPI  Liliana Hermosillo is a 82 y.o. female presenting to clinic today for lab review. Labs reviewed and discussed as essentially normal and within acceptable range.   She had questions about fluid pill, blood pressure dropping too low, dizziness, but still retaining fluid. She reports home Bps 100s/60s.      Review of Systems   Constitutional: Negative for chills and fever.   HENT: Negative for congestion.    Respiratory: Negative for cough and shortness of breath.    Cardiovascular: Negative for chest pain and leg swelling.   Gastrointestinal: Negative for abdominal pain, constipation, diarrhea, nausea and vomiting.   Genitourinary: Negative for difficulty urinating.   Neurological: Negative for dizziness and headaches.   All other systems reviewed and are negative.          Objective:      /72 (BP Location: Left arm, Patient Position: Sitting, BP Method: Medium (Automatic))   Pulse 76   Resp 18   Ht 5' 3" (1.6 m)   Wt 65 kg (143 lb 3.2 oz)   SpO2 99%   BMI 25.37 kg/m²   Physical Exam  Vitals and nursing note reviewed.   Constitutional:       Appearance: Normal appearance.   HENT:      Head: Normocephalic and atraumatic.      Nose: Nose normal.   Eyes:      Pupils: Pupils are equal, round, and reactive to light.   Cardiovascular:      Rate and Rhythm: Normal rate and regular rhythm.      Heart sounds: Normal heart sounds.   Pulmonary:      Effort: Pulmonary effort is normal.      Breath sounds: Normal breath sounds.   Abdominal:      General: Abdomen is flat.   Musculoskeletal:         General: Normal range of motion.      Cervical back: Normal range of motion.   Skin:     General: Skin is warm and dry.   Neurological:      Mental Status: She is alert and oriented to person, place, and time. "   Psychiatric:         Mood and Affect: Mood normal.         Behavior: Behavior normal.         Thought Content: Thought content normal.         Judgment: Judgment normal.         Assessment and Plan:       1. Multinodular thyroid  -     US Soft Tissue Head Neck Thyroid    2. Lung nodule  -     CT Chest Without Contrast    3. Hypertension, unspecified type  -     hydroCHLOROthiazide (HYDRODIURIL) 25 MG tablet        PLAN: Applies to all diagnosis and orders listed above.  - Stop Maxide, increased HCTZ  - Will not make changes to anxiety medication at this time, may take up to tid  - Follow up if symptoms worsen or fail to improve.     Discussed with patient the plan of care. Medications reviewed. Medication side effects discussed. Patient has no questions or concerns at this time. Reviewed, monitored, evaluated, and assessed chronic conditions as outlined above. Reviewed family, medical, surgical, and social history. Reviewed and discussed labs and imaging from the last 3 months.  Informed patient to please notify me with any questions or concerns at anytime.    Thank you,  APRIL Cespedes  Family Medicine  [facility]

## 2022-08-19 ENCOUNTER — TELEPHONE (OUTPATIENT)
Dept: GASTROENTEROLOGY | Facility: CLINIC | Age: 82
End: 2022-08-19
Payer: MEDICARE

## 2022-08-19 NOTE — TELEPHONE ENCOUNTER
----- Message from Jacobo Ochoa sent at 8/19/2022 11:50 AM CDT -----  Regarding: pt called  Name of Who is Calling: SUSANA MACHADO [[id number]]      What is the request in detail: pt called requesting appt.Please advise      Can the clinic reply by [facility]: No      What Number to Call Back if not in [facility]: [phone]

## 2022-08-22 ENCOUNTER — TELEPHONE (OUTPATIENT)
Dept: PAIN MEDICINE | Facility: CLINIC | Age: 82
End: 2022-08-22
Payer: MEDICARE

## 2022-08-22 ENCOUNTER — OFFICE VISIT (OUTPATIENT)
Dept: SPINE | Facility: CLINIC | Age: 82
End: 2022-08-22
Payer: MEDICARE

## 2022-08-22 VITALS — WEIGHT: 143 LBS | RESPIRATION RATE: 18 BRPM | HEIGHT: 63 IN | BODY MASS INDEX: 25.34 KG/M2

## 2022-08-22 DIAGNOSIS — M50.30 DDD (DEGENERATIVE DISC DISEASE), CERVICAL: Primary | ICD-10-CM

## 2022-08-22 DIAGNOSIS — M54.2 NECK PAIN: Primary | ICD-10-CM

## 2022-08-22 PROCEDURE — 99213 PR OFFICE/OUTPT VISIT, EST, LEVL III, 20-29 MIN: ICD-10-PCS | Mod: S$GLB,,, | Performed by: PHYSICAL MEDICINE & REHABILITATION

## 2022-08-22 PROCEDURE — 99213 OFFICE O/P EST LOW 20 MIN: CPT | Mod: S$GLB,,, | Performed by: PHYSICAL MEDICINE & REHABILITATION

## 2022-08-22 NOTE — TELEPHONE ENCOUNTER
----- Message from Charanjit Conner MD sent at 8/22/2022 11:39 AM CDT -----  Please schedule for C2-3 and 3rd occipital nerve blocks bilaterally with subsequent radiofrequency ablation as indicated

## 2022-08-22 NOTE — PROGRESS NOTES
SUBJECTIVE:    Patient ID: Liliana Hermosillo is a 82 y.o. female.    Chief Complaint: Follow-up (cervical LAZARO f/u)    She is here for follow-up status post interlaminar injection C7-T1 on 2022 with Dr. Rodríguez.  This is her 2nd procedure at that level.  This time around she did not have any lasting benefit.  Primary complaint is posterior neck discomfort radiating into the occiput which is worse at night.  Pain level at night is as high as 8/10.  She is not having any discomfort now.  She also gets discomfort radiating into the upper shoulders at times.  Pain with rotation of the head to the left and to the right        Past Medical History:   Diagnosis Date    Atypical ductal hyperplasia, breast     Chronic constipation     Diverticulitis     Diverticulosis     Edema     Hypertension     Ovarian cyst      Social History     Socioeconomic History    Marital status:    Tobacco Use    Smoking status: Former Smoker     Packs/day: 2.00     Years: 0.00     Pack years: 0.00     Types: Cigarettes     Start date: 1960     Quit date: 1985     Years since quittin.7    Smokeless tobacco: Never Used   Substance and Sexual Activity    Alcohol use: Yes     Alcohol/week: 2.0 standard drinks     Types: 2 Glasses of wine per week    Drug use: No    Sexual activity: Not Currently     Partners: Male     Birth control/protection: Abstinence   Social History Narrative    ADVANCED MD PLANS        GYN Visit & [id number] [facility]2019     Thyroid nodule.  Lung nodule.  T7 compression fracture.  Osteopenia.  Vulvodynia.    Consult Dr. Lake for recommendations.    Recommend Prolia injections.    Patient needs calcium and vitamin D level.    Consult Dr. Tucker for evaluation for multiple myeloma    Visit Summary    Prescriptions:    SIG: Percocet 5-325 mg oral tablet, 5 days, Dispense #20 Tablet, 0 Refills    Directions: Take 1/2 to 1 oral tablet every 4-6 hours as needed     Gyn Exam / Hysterectomy 10  [facility]4/11/2019     Annual exam.  History of a sacral mass.  Pudendal nerve neuropathy.    Mammogram and bone mineral density at [facility].    MRI of the pubis/sacrum.    Medrol Dosepak.    Return to clinic in one week.    Visit Summary    Prescriptions:    SIG: Medrol (Johnny) 4 mg oral tablets,dose pack, 9 days, Dispense #1 Dose Pack, 0 Refills    Directions: take as directed     Gyn Exam / Hysterectomy 10 [facility]3/22/2018     Annual.  Pudendal neuropathy.  Vaginal atrophy.  Insomnia.  GERD.  Hyperlipidemia.    History of diverticulosis with diverticulitis.    Mammogram and bone mineral density at [facility].    Elavil 10 mg by mouth daily at bedtime.    Return to clinic in one month.    Awaiting results.  CT scan.    Consider Bentyl or Levsin for abdominal pain.    Consider Linzess at return to clinic.    PCP: Dr. Su.    Call patient and inform her that she needs annual labs performed at return to clinic.  Have her fasting.    Visit Summary     GYN Visit & [id number] [facility]4/27/2017     Pharyngitis.  Vaginal atrophy.    Visit Summary    Prescriptions:    SIG: Estring 2 mg (7.5 mcg /24 hour) ring, 90 days, Dispense #1 Ring, 4 Refills    Directions: insert vaginally every 3 months    SIG: Mucinex D  mg tablet extended release 12 hr, 10 days, Dispense #20 Tablet, 0 Refills    Directions: Take 1 oral tablet 2 times a day    SIG: Zithromax Z-Johnny 250 mg tablet, 5 days, Dispense #6 Tablet, 0 Refills    Directions: Take as directed        Plan:    11/1/2017 Annual     GYN Visit & [id number] [facility]3/9/2017     Vaginal atrophy.    Estring placed without difficulty.    Return to clinic in 8 weeks for followup.    Visit Summary    [facility]:    Mammogram-Complete ()        Plan:    11/1/2017 Annual     GYN Visit & [id number] [facility]2/16/2017     Vaginitis.  Vaginal atrophy.  HSV-2.    Return to clinic in 3 weeks.    Visit Summary    Ordered:    Affirm    Affirm        Prescriptions:    SIG:  Premarin 0.625 mg/gram cream, 90 days, Dispense #3 Tube, 4 Refills    Directions: 0.5g intravaginally QHS x 2 weeks then biweekly    SIG: Valtrex 1 gram tablet, 90 days, Dispense #90 Tablet, 4 Refills    Directions: Take 1 oral tablet once a day        Plan:    11/1/2017 Annual     MLT Procedure #2/3/M1/24/2017     Vaginal Atrophy    Post treatment instructions and precautions explained    Questionnaire and consent forms signed    Gerson Procedure # 3 was Given    Follow up after for maintenance     MLT Procedure #2/3/M12/13/2016     Vaginal Atrophy    Gerson Procedure # 2 was Given    Follow up after 6 weeks for Procedure #3     GYN Exam/[id number] 201611/1/2016     Annual exam.  Vaginal atrophy.    Return to clinic in 6 weeks for Cara Wallis procedure #2.    Colonoscopy.  Next year.    Mammogram and bone mineral density at [facility].    Health maintenance information given to patient    Visit Summary    Plan:    12/15/2016 Mammogram    11/1/2017 Annual     MLT Procedure #2/3/M11/1/2016     Vaginal Atrophy    Gerson Procedure #  1 was Given    Follow up after 6 weeks for Procedure #2     [id number]     Vaginal [facility].  Family history of breast cancer.    Return to clinic for Cara Wallis procedure an annual exam.    Face-to-face time with patient greater than 60 min. discussing treatment with Cara Wallis     Past Surgical History:   Procedure Laterality Date    ANKLE ARTHROSCOPY W/ OPEN REPAIR      APPENDECTOMY  1972    BREAST BIOPSY Left     CHOLECYSTECTOMY      COLONOSCOPY      EPIDURAL STEROID INJECTION N/A 7/6/2022    Procedure: Injection, Steroid, Epidural NAIDA C7-T1;  Surgeon: Kobe Rodríguez MD;  Location: [facility] OR;  Service: Pain Management;  Laterality: N/A;  Medicare    EPIDURAL STEROID INJECTION INTO CERVICAL SPINE N/A 3/30/2022    Procedure: Injection-steroid-epidural-cervical;  Surgeon: Kobe Rodríguez MD;  Location: [facility] OR;  Service: Pain  "Management;  Laterality: N/A;  c7-t1    EYE SURGERY  2012    HYSTERECTOMY      KNEE SURGERY      TONSILLECTOMY  1948    UPPER GASTROINTESTINAL ENDOSCOPY       Family History   Problem Relation Age of Onset    Colon cancer Mother     Anuerysm Father     Breast cancer Sister     Breast cancer Maternal Aunt     Collagen disease Neg Hx     Ovarian cancer Neg Hx      Vitals:    08/22/22 1126   Resp: 18   Weight: 64.9 kg (143 lb)   Height: 5' 3" (1.6 m)       Review of Systems   Constitutional: Negative for chills, diaphoresis, fatigue, fever and unexpected weight change.   HENT: Negative for trouble swallowing.    Eyes: Negative for visual disturbance.   Respiratory: Negative for shortness of breath.    Cardiovascular: Negative for chest pain.   Gastrointestinal: Negative for abdominal pain, constipation, nausea and vomiting.   Genitourinary: Negative for difficulty urinating.   Musculoskeletal: Negative for arthralgias, back pain, gait problem, joint swelling, myalgias, neck pain and neck stiffness.   Neurological: Negative for dizziness, speech difficulty, weakness, light-headedness, numbness and headaches.          Objective:      Physical Exam  Neurological:      Mental Status: She is alert and oriented to person, place, and time.             Assessment:       1. Neck pain           Plan:     no lasting benefit from her 2nd interlaminar injection at C7-T1.  As previously discussed we will move on to medial branch blocks/radiofrequency ablation of the C2-3 facet joints and 3rd occipital nerves on both sides.  Consider radiofrequency ablation C5-6 and C6-7 bilaterally.  Follow-up with me after the procedure      Neck pain          "

## 2022-08-22 NOTE — H&P (VIEW-ONLY)
SUBJECTIVE:    Patient ID: Liliana Hermosillo is a 82 y.o. female.    Chief Complaint: Follow-up (cervical LAZARO f/u)    She is here for follow-up status post interlaminar injection C7-T1 on 2022 with Dr. Rodríguez.  This is her 2nd procedure at that level.  This time around she did not have any lasting benefit.  Primary complaint is posterior neck discomfort radiating into the occiput which is worse at night.  Pain level at night is as high as 8/10.  She is not having any discomfort now.  She also gets discomfort radiating into the upper shoulders at times.  Pain with rotation of the head to the left and to the right        Past Medical History:   Diagnosis Date    Atypical ductal hyperplasia, breast     Chronic constipation     Diverticulitis     Diverticulosis     Edema     Hypertension     Ovarian cyst      Social History     Socioeconomic History    Marital status:    Tobacco Use    Smoking status: Former Smoker     Packs/day: 2.00     Years: 0.00     Pack years: 0.00     Types: Cigarettes     Start date: 1960     Quit date: 1985     Years since quittin.7    Smokeless tobacco: Never Used   Substance and Sexual Activity    Alcohol use: Yes     Alcohol/week: 2.0 standard drinks     Types: 2 Glasses of wine per week    Drug use: No    Sexual activity: Not Currently     Partners: Male     Birth control/protection: Abstinence   Social History Narrative    ADVANCED MD PLANS        GYN Visit & [id number] [facility]2019     Thyroid nodule.  Lung nodule.  T7 compression fracture.  Osteopenia.  Vulvodynia.    Consult Dr. Lake for recommendations.    Recommend Prolia injections.    Patient needs calcium and vitamin D level.    Consult Dr. Tucker for evaluation for multiple myeloma    Visit Summary    Prescriptions:    SIG: Percocet 5-325 mg oral tablet, 5 days, Dispense #20 Tablet, 0 Refills    Directions: Take 1/2 to 1 oral tablet every 4-6 hours as needed     Gyn Exam / Hysterectomy 10  [facility]4/11/2019     Annual exam.  History of a sacral mass.  Pudendal nerve neuropathy.    Mammogram and bone mineral density at [facility].    MRI of the pubis/sacrum.    Medrol Dosepak.    Return to clinic in one week.    Visit Summary    Prescriptions:    SIG: Medrol (Johnny) 4 mg oral tablets,dose pack, 9 days, Dispense #1 Dose Pack, 0 Refills    Directions: take as directed     Gyn Exam / Hysterectomy 10 [facility]3/22/2018     Annual.  Pudendal neuropathy.  Vaginal atrophy.  Insomnia.  GERD.  Hyperlipidemia.    History of diverticulosis with diverticulitis.    Mammogram and bone mineral density at [facility].    Elavil 10 mg by mouth daily at bedtime.    Return to clinic in one month.    Awaiting results.  CT scan.    Consider Bentyl or Levsin for abdominal pain.    Consider Linzess at return to clinic.    PCP: Dr. Su.    Call patient and inform her that she needs annual labs performed at return to clinic.  Have her fasting.    Visit Summary     GYN Visit & [id number] [facility]4/27/2017     Pharyngitis.  Vaginal atrophy.    Visit Summary    Prescriptions:    SIG: Estring 2 mg (7.5 mcg /24 hour) ring, 90 days, Dispense #1 Ring, 4 Refills    Directions: insert vaginally every 3 months    SIG: Mucinex D  mg tablet extended release 12 hr, 10 days, Dispense #20 Tablet, 0 Refills    Directions: Take 1 oral tablet 2 times a day    SIG: Zithromax Z-Johnny 250 mg tablet, 5 days, Dispense #6 Tablet, 0 Refills    Directions: Take as directed        Plan:    11/1/2017 Annual     GYN Visit & [id number] [facility]3/9/2017     Vaginal atrophy.    Estring placed without difficulty.    Return to clinic in 8 weeks for followup.    Visit Summary    [facility]:    Mammogram-Complete ()        Plan:    11/1/2017 Annual     GYN Visit & [id number] [facility]2/16/2017     Vaginitis.  Vaginal atrophy.  HSV-2.    Return to clinic in 3 weeks.    Visit Summary    Ordered:    Affirm    Affirm        Prescriptions:    SIG:  Premarin 0.625 mg/gram cream, 90 days, Dispense #3 Tube, 4 Refills    Directions: 0.5g intravaginally QHS x 2 weeks then biweekly    SIG: Valtrex 1 gram tablet, 90 days, Dispense #90 Tablet, 4 Refills    Directions: Take 1 oral tablet once a day        Plan:    11/1/2017 Annual     MLT Procedure #2/3/M1/24/2017     Vaginal Atrophy    Post treatment instructions and precautions explained    Questionnaire and consent forms signed    Gerson Procedure # 3 was Given    Follow up after for maintenance     MLT Procedure #2/3/M12/13/2016     Vaginal Atrophy    Gerson Procedure # 2 was Given    Follow up after 6 weeks for Procedure #3     GYN Exam/[id number] 201611/1/2016     Annual exam.  Vaginal atrophy.    Return to clinic in 6 weeks for Cara Wallis procedure #2.    Colonoscopy.  Next year.    Mammogram and bone mineral density at [facility].    Health maintenance information given to patient    Visit Summary    Plan:    12/15/2016 Mammogram    11/1/2017 Annual     MLT Procedure #2/3/M11/1/2016     Vaginal Atrophy    Gerson Procedure #  1 was Given    Follow up after 6 weeks for Procedure #2     [id number]     V[facility]inal [facility].  Family history of breast cancer.    Return to clinic for Cara Wallis procedure an annual exam.    Face-to-face time with patient greater than 60 min. discussing treatment with Cara Wallis     Past Surgical History:   Procedure Laterality Date    ANKLE ARTHROSCOPY W/ OPEN REPAIR      APPENDECTOMY  1972    BREAST BIOPSY Left     CHOLECYSTECTOMY      COLONOSCOPY      EPIDURAL STEROID INJECTION N/A 7/6/2022    Procedure: Injection, Steroid, Epidural NAIDA C7-T1;  Surgeon: Kobe Rodríguez MD;  Location: [facility] OR;  Service: Pain Management;  Laterality: N/A;  Medicare    EPIDURAL STEROID INJECTION INTO CERVICAL SPINE N/A 3/30/2022    Procedure: Injection-steroid-epidural-cervical;  Surgeon: Kobe Rodríguez MD;  Location: [facility] OR;  Service: Pain  "Management;  Laterality: N/A;  c7-t1    EYE SURGERY  2012    HYSTERECTOMY      KNEE SURGERY      TONSILLECTOMY  1948    UPPER GASTROINTESTINAL ENDOSCOPY       Family History   Problem Relation Age of Onset    Colon cancer Mother     Anuerysm Father     Breast cancer Sister     Breast cancer Maternal Aunt     Collagen disease Neg Hx     Ovarian cancer Neg Hx      Vitals:    08/22/22 1126   Resp: 18   Weight: 64.9 kg (143 lb)   Height: 5' 3" (1.6 m)       Review of Systems   Constitutional: Negative for chills, diaphoresis, fatigue, fever and unexpected weight change.   HENT: Negative for trouble swallowing.    Eyes: Negative for visual disturbance.   Respiratory: Negative for shortness of breath.    Cardiovascular: Negative for chest pain.   Gastrointestinal: Negative for abdominal pain, constipation, nausea and vomiting.   Genitourinary: Negative for difficulty urinating.   Musculoskeletal: Negative for arthralgias, back pain, gait problem, joint swelling, myalgias, neck pain and neck stiffness.   Neurological: Negative for dizziness, speech difficulty, weakness, light-headedness, numbness and headaches.          Objective:      Physical Exam  Neurological:      Mental Status: She is alert and oriented to person, place, and time.             Assessment:       1. Neck pain           Plan:     no lasting benefit from her 2nd interlaminar injection at C7-T1.  As previously discussed we will move on to medial branch blocks/radiofrequency ablation of the C2-3 facet joints and 3rd occipital nerves on both sides.  Consider radiofrequency ablation C5-6 and C6-7 bilaterally.  Follow-up with me after the procedure      Neck pain          "

## 2022-08-23 ENCOUNTER — TELEPHONE (OUTPATIENT)
Dept: GASTROENTEROLOGY | Facility: CLINIC | Age: 82
End: 2022-08-23
Payer: MEDICARE

## 2022-08-23 DIAGNOSIS — N28.1 RENAL CYST: Primary | ICD-10-CM

## 2022-08-23 NOTE — TELEPHONE ENCOUNTER
----- Message from Lul rOr MD sent at 8/19/2022  7:10 AM CDT -----  Ultrasound shows a renal cyst.  Referred to urologist.  Additionally she has a fatty liver and she needs to size be on vitamin E 800 units per day.

## 2022-08-25 ENCOUNTER — OFFICE VISIT (OUTPATIENT)
Dept: UROLOGY | Facility: CLINIC | Age: 82
End: 2022-08-25
Payer: MEDICARE

## 2022-08-25 VITALS
DIASTOLIC BLOOD PRESSURE: 76 MMHG | BODY MASS INDEX: 25.35 KG/M2 | HEART RATE: 76 BPM | WEIGHT: 143.06 LBS | SYSTOLIC BLOOD PRESSURE: 147 MMHG | HEIGHT: 63 IN

## 2022-08-25 DIAGNOSIS — R39.198 DIFFICULTY IN URINATION: ICD-10-CM

## 2022-08-25 DIAGNOSIS — N28.1 RENAL CYST: Primary | ICD-10-CM

## 2022-08-25 PROCEDURE — 99213 OFFICE O/P EST LOW 20 MIN: CPT | Mod: PBBFAC,25,PN | Performed by: NURSE PRACTITIONER

## 2022-08-25 PROCEDURE — 51798 US URINE CAPACITY MEASURE: CPT | Mod: PBBFAC,PN | Performed by: NURSE PRACTITIONER

## 2022-08-25 PROCEDURE — 51701 INSERT BLADDER CATHETER: CPT | Mod: PBBFAC,PN | Performed by: NURSE PRACTITIONER

## 2022-08-25 PROCEDURE — 99204 PR OFFICE/OUTPT VISIT, NEW, LEVL IV, 45-59 MIN: ICD-10-PCS | Mod: S$PBB,25,, | Performed by: NURSE PRACTITIONER

## 2022-08-25 PROCEDURE — 99204 OFFICE O/P NEW MOD 45 MIN: CPT | Mod: S$PBB,25,, | Performed by: NURSE PRACTITIONER

## 2022-08-25 PROCEDURE — 87086 URINE CULTURE/COLONY COUNT: CPT | Performed by: NURSE PRACTITIONER

## 2022-08-25 PROCEDURE — 99999 PR PBB SHADOW E&M-EST. PATIENT-LVL III: CPT | Mod: PBBFAC,,, | Performed by: NURSE PRACTITIONER

## 2022-08-25 PROCEDURE — 99999 PR PBB SHADOW E&M-EST. PATIENT-LVL III: ICD-10-PCS | Mod: PBBFAC,,, | Performed by: NURSE PRACTITIONER

## 2022-08-25 PROCEDURE — 51701 INSERT,NON-INDWELLING BLADDER CATHETER: ICD-10-PCS | Mod: S$PBB,,, | Performed by: NURSE PRACTITIONER

## 2022-08-25 PROCEDURE — 51701 INSERT BLADDER CATHETER: CPT | Mod: S$PBB,,, | Performed by: NURSE PRACTITIONER

## 2022-08-25 RX ORDER — ROSUVASTATIN CALCIUM 20 MG/1
1 TABLET, COATED ORAL
COMMUNITY
End: 2022-11-02

## 2022-08-25 RX ORDER — PHENAZOPYRIDINE HYDROCHLORIDE 200 MG/1
200 TABLET, FILM COATED ORAL 3 TIMES DAILY PRN
Qty: 20 TABLET | Refills: 0 | Status: SHIPPED | OUTPATIENT
Start: 2022-08-25 | End: 2022-08-25

## 2022-08-25 RX ORDER — AMOXICILLIN AND CLAVULANATE POTASSIUM 500; 125 MG/1; MG/1
1 TABLET, FILM COATED ORAL 2 TIMES DAILY
Qty: 14 TABLET | Refills: 0 | Status: SHIPPED | OUTPATIENT
Start: 2022-08-25 | End: 2022-09-01

## 2022-08-25 RX ORDER — FLUCONAZOLE 150 MG/1
150 TABLET ORAL DAILY
Qty: 3 TABLET | Refills: 0 | Status: SHIPPED | OUTPATIENT
Start: 2022-08-25 | End: 2022-08-28

## 2022-08-25 NOTE — PROGRESS NOTES
[facility] Note  Staff: APRIL Panda    PCP: MD Kirby    Chief Complaint: Renal Cyst    Subjective:        HPI: Liliana Hermosillo is a 82 y.o. female NEW PT presents today for evaluation of an recent incidental finding of a renal cyst on ultrasound results.    Pt was recently evaluated by her PCP office on 8/8/22 for abdominal pain/GI problem.  Pt today, also c/o urinary frequency, intermittent dysuria symptoms and leakage.    Therefore an US Abdomen Limited was performed on 8/18/22 and showed the following:  IMPRESSION:  5.3 cm left renal cyst.    REVIEW OF SYSTEMS:  A comprehensive 10 system review was performed and is negative except as noted above in HPI    PMHx:  Past Medical History:   Diagnosis Date    Atypical ductal hyperplasia, breast     Chronic constipation     Diverticulitis     Diverticulosis     Edema     Hypertension     Ovarian cyst      PSHx:  Past Surgical History:   Procedure Laterality Date    ANKLE ARTHROSCOPY W/ OPEN REPAIR      APPENDECTOMY  1972    BREAST BIOPSY Left     CHOLECYSTECTOMY      COLONOSCOPY      EPIDURAL STEROID INJECTION N/A 7/6/2022    Procedure: Injection, Steroid, Epidural NAIDA C7-T1;  Surgeon: Kobe Rodríguez MD;  Location: [facility] OR;  Service: Pain Management;  Laterality: N/A;  Medicare    EPIDURAL STEROID INJECTION INTO CERVICAL SPINE N/A 3/30/2022    Procedure: Injection-steroid-epidural-cervical;  Surgeon: Kobe Rodríguez MD;  Location: [facility] OR;  Service: Pain Management;  Laterality: N/A;  c7-t1    EYE SURGERY  2012    HYSTERECTOMY      KNEE SURGERY      TONSILLECTOMY  1948    UPPER GASTROINTESTINAL ENDOSCOPY       Allergies:  Gabapentin, Pantoprazole, and Monosodium glutamate    Medications: reviewed     Objective:     Vitals:    08/25/22 1425   BP: (!) 147/76   Pulse: 76     Physical Exam  Vitals reviewed.   Constitutional:       Appearance: She is well-developed.   HENT:      Head: Normocephalic and atraumatic.   Eyes:       Conjunctiva/sclera: Conjunctivae normal.      Pupils: Pupils are equal, round, and reactive to light.   Cardiovascular:      Rate and Rhythm: Normal rate and regular rhythm.      Heart sounds: Normal heart sounds.   Pulmonary:      Effort: Pulmonary effort is normal.      Breath sounds: Normal breath sounds.   Abdominal:      General: Bowel sounds are normal.      Palpations: Abdomen is soft.   Musculoskeletal:         General: Normal range of motion.      Cervical back: Normal range of motion and neck supple.   Skin:     General: Skin is warm and dry.   Neurological:      Mental Status: She is alert and oriented to person, place, and time.      Deep Tendon Reflexes: Reflexes are normal and symmetric.   Psychiatric:         Behavior: Behavior normal.         Thought Content: Thought content normal.         Judgment: Judgment normal.      EXAM performed by me in office today:  External Genitalia: normal hair distribution, no lesions  Urethral meatus: normal without prolapse or caruncle  Urethra: without tenderness or mass  Bladder: without fulness or tenderness  10 fr straight In and out cath performed by me with 10 mL of urine residual obtained    Assessment:       1. Renal cyst    2. Difficulty in urination          Plan:     Urine Culture to be processed from cath'd specimen today.  In the meantime, due to appt close to weekend, will go ahead and prescribe her Augmentin until we receive urine culture results.  Discussed conservative measures to control urgency and frequency including avoiding bladder irritants, timed voiding, not postponing voiding, and bowel regimen (as distended bowel has extrinsic compressive effect on bladder. Discussed bladder irritants include coffe (even decaf), tea, alcohol, soda, spicy foods, acidic juices (orange, tomato), vinegar, and artificial sweeteners.     F/u-Our office will contact this pt after we receive and review ALL results to determine best POC for this patient.  Pt  verbalized understanding at conclusion of appointment today.      Gely Juárez, FNP-C

## 2022-08-27 LAB — BACTERIA UR CULT: NO GROWTH

## 2022-09-07 ENCOUNTER — HOSPITAL ENCOUNTER (OUTPATIENT)
Facility: HOSPITAL | Age: 82
Discharge: HOME | End: 2022-09-07
Attending: ANESTHESIOLOGY | Admitting: ANESTHESIOLOGY
Payer: MEDICARE

## 2022-09-07 VITALS
OXYGEN SATURATION: 95 % | SYSTOLIC BLOOD PRESSURE: 133 MMHG | HEART RATE: 69 BPM | DIASTOLIC BLOOD PRESSURE: 84 MMHG | HEIGHT: 63 IN | TEMPERATURE: 98 F | RESPIRATION RATE: 14 BRPM | BODY MASS INDEX: 24.8 KG/M2 | WEIGHT: 140 LBS

## 2022-09-07 DIAGNOSIS — M47.892 OTHER SPONDYLOSIS, CERVICAL REGION: Primary | ICD-10-CM

## 2022-09-07 PROCEDURE — 64490 PR INJ DX/THER AGNT PARAVERT FACET JOINT,IMG GUIDE,CERV/THORAC, 1ST LEVEL: ICD-10-PCS | Mod: 50,,, | Performed by: ANESTHESIOLOGY

## 2022-09-07 PROCEDURE — 63600175 PHARM REV CODE 636 W HCPCS: Performed by: ANESTHESIOLOGY

## 2022-09-07 PROCEDURE — 25000003 PHARM REV CODE 250: Performed by: ANESTHESIOLOGY

## 2022-09-07 PROCEDURE — 99152 MOD SED SAME PHYS/QHP 5/>YRS: CPT | Performed by: ANESTHESIOLOGY

## 2022-09-07 PROCEDURE — 64490 INJ PARAVERT F JNT C/T 1 LEV: CPT | Mod: 50,,, | Performed by: ANESTHESIOLOGY

## 2022-09-07 PROCEDURE — 64491 PR INJ DX/THER AGNT PARAVERT FACET JOINT,IMG GUIDE,CERV/THORAC, 2ND LEVEL: ICD-10-PCS | Mod: 50,,, | Performed by: ANESTHESIOLOGY

## 2022-09-07 PROCEDURE — 64491 INJ PARAVERT F JNT C/T 2 LEV: CPT | Mod: 50,,, | Performed by: ANESTHESIOLOGY

## 2022-09-07 PROCEDURE — 64490 INJ PARAVERT F JNT C/T 1 LEV: CPT | Mod: 50 | Performed by: ANESTHESIOLOGY

## 2022-09-07 RX ORDER — LIDOCAINE HYDROCHLORIDE 10 MG/ML
INJECTION INFILTRATION; PERINEURAL
Status: DISCONTINUED | OUTPATIENT
Start: 2022-09-07 | End: 2022-09-07 | Stop reason: HOSPADM

## 2022-09-07 RX ORDER — SODIUM CHLORIDE, SODIUM LACTATE, POTASSIUM CHLORIDE, CALCIUM CHLORIDE 600; 310; 30; 20 MG/100ML; MG/100ML; MG/100ML; MG/100ML
INJECTION, SOLUTION INTRAVENOUS CONTINUOUS
Status: ACTIVE | OUTPATIENT
Start: 2022-09-07

## 2022-09-07 RX ORDER — MIDAZOLAM HYDROCHLORIDE 5 MG/ML
INJECTION INTRAMUSCULAR; INTRAVENOUS
Status: DISCONTINUED | OUTPATIENT
Start: 2022-09-07 | End: 2022-09-07 | Stop reason: HOSPADM

## 2022-09-07 RX ORDER — BUPIVACAINE HYDROCHLORIDE 5 MG/ML
INJECTION, SOLUTION EPIDURAL; INTRACAUDAL
Status: DISCONTINUED | OUTPATIENT
Start: 2022-09-07 | End: 2022-09-07 | Stop reason: HOSPADM

## 2022-09-07 RX ADMIN — SODIUM CHLORIDE, SODIUM LACTATE, POTASSIUM CHLORIDE, AND CALCIUM CHLORIDE: .6; .31; .03; .02 INJECTION, SOLUTION INTRAVENOUS at 12:09

## 2022-09-07 NOTE — PLAN OF CARE
Pt given discharge instructions. Pt verbalizes understanding of teaching. Pt discharged to private vehicle with personal belongings via w/c. Pt in stable condition at time of discharge.

## 2022-09-07 NOTE — OP NOTE
DATE: 9/7/2022    PROCEDURE:  Cervical medial branch block of the C2 and C3 medial branch nerves on the bilateral-side utilizing fluoroscopy (corresponding to the C2-C3 facet joint + TON)    DIAGNOSIS:  Other cervical spondylosis    PHYSICIAN: Kobe Rodríguez MD    MEDICATIONS INJECTED:  0.5% bupivicaine, 0.5ml at each level.    LOCAL ANESTHETIC USED:   1% lidocaine, 1ml at each level.    SEDATION MEDICATIONS: RN IV sedation    ESTIMATED BLOOD LOSS:  none    COMPLICATIONS:  none    TECHNIQUE : A time-out was taken to identify patient and procedure side prior to starting the procedure.  The patient was positioned in the prone position. The patient was prepped and draped in the usual sterile fashion using ChloraPrep and sterile towels.  The level was determined under fluoroscopic guidance using a slightly posteriorly oblique view.   Local anesthetic was infiltrated superficially at the skin level.  Then, a 3.5 inch 25 gauge needle was inserted to the anatomic location of the midsection of the lateral masses of C2 and C3 on the bilateral side(s). A cross table view was then taken to ensure that needles did not cross into neural foramina.  The above noted medication was then injected. The patient tolerated the procedure well.     The patient was monitored after the procedure. Patient given a pain diary to document progress after procedure.  If found to have greater than a 50% recovery and so will be scheduled for a radiofrequency ablation of the corresponding nerves.     The patient was given post procedure and discharge instructions to follow at home. The patient was discharged in a stable condition

## 2022-09-07 NOTE — DISCHARGE INSTRUCTIONS

## 2022-09-07 NOTE — DISCHARGE SUMMARY
[facility]  Discharge Note  Short Stay    Procedure(s) (LRB):  INJECTION, NERVE (Bilateral)    OUTCOME: Patient tolerated treatment/procedure well without complication and is now ready for discharge.    DISPOSITION: Home or Self Care    FINAL DIAGNOSIS:  Other spondylosis, cervical    FOLLOWUP: In clinic    DISCHARGE INSTRUCTIONS:    Discharge Procedure Orders   Diet general     Call MD for:  temperature >100.4     Call MD for:  persistent nausea and vomiting     Call MD for:  severe uncontrolled pain     Call MD for:  difficulty breathing, headache or visual disturbances     Call MD for:  redness, tenderness, or signs of infection (pain, swelling, redness, odor or green/yellow discharge around incision site)     Call MD for:  hives     Call MD for:  persistent dizziness or light-headedness     Call MD for:  extreme fatigue        TIME SPENT ON DISCHARGE: 30 minutes

## 2022-09-09 ENCOUNTER — TELEPHONE (OUTPATIENT)
Dept: SPINE | Facility: CLINIC | Age: 82
End: 2022-09-09
Payer: MEDICARE

## 2022-09-09 ENCOUNTER — OFFICE VISIT (OUTPATIENT)
Dept: GASTROENTEROLOGY | Facility: CLINIC | Age: 82
End: 2022-09-09
Payer: MEDICARE

## 2022-09-09 VITALS
BODY MASS INDEX: 25.21 KG/M2 | DIASTOLIC BLOOD PRESSURE: 79 MMHG | WEIGHT: 142.31 LBS | SYSTOLIC BLOOD PRESSURE: 118 MMHG | HEIGHT: 63 IN | OXYGEN SATURATION: 95 % | HEART RATE: 84 BPM | RESPIRATION RATE: 16 BRPM

## 2022-09-09 DIAGNOSIS — K76.0 FATTY LIVER: ICD-10-CM

## 2022-09-09 DIAGNOSIS — K44.9 HIATAL HERNIA: ICD-10-CM

## 2022-09-09 DIAGNOSIS — K59.00 CONSTIPATION, UNSPECIFIED CONSTIPATION TYPE: ICD-10-CM

## 2022-09-09 DIAGNOSIS — K57.90 DIVERTICULOSIS: ICD-10-CM

## 2022-09-09 DIAGNOSIS — M47.892 OTHER SPONDYLOSIS, CERVICAL REGION: ICD-10-CM

## 2022-09-09 DIAGNOSIS — M50.30 DDD (DEGENERATIVE DISC DISEASE), CERVICAL: Primary | ICD-10-CM

## 2022-09-09 DIAGNOSIS — K21.9 GASTROESOPHAGEAL REFLUX DISEASE WITHOUT ESOPHAGITIS: Primary | ICD-10-CM

## 2022-09-09 PROCEDURE — 99214 OFFICE O/P EST MOD 30 MIN: CPT | Mod: PBBFAC,PN | Performed by: INTERNAL MEDICINE

## 2022-09-09 PROCEDURE — 99999 PR PBB SHADOW E&M-EST. PATIENT-LVL IV: CPT | Mod: PBBFAC,,, | Performed by: INTERNAL MEDICINE

## 2022-09-09 PROCEDURE — 99999 PR PBB SHADOW E&M-EST. PATIENT-LVL IV: ICD-10-PCS | Mod: PBBFAC,,, | Performed by: INTERNAL MEDICINE

## 2022-09-09 PROCEDURE — 99214 PR OFFICE/OUTPT VISIT, EST, LEVL IV, 30-39 MIN: ICD-10-PCS | Mod: S$PBB,,, | Performed by: INTERNAL MEDICINE

## 2022-09-09 PROCEDURE — 99214 OFFICE O/P EST MOD 30 MIN: CPT | Mod: S$PBB,,, | Performed by: INTERNAL MEDICINE

## 2022-09-09 RX ORDER — PHENAZOPYRIDINE HYDROCHLORIDE 200 MG/1
200 TABLET, FILM COATED ORAL 3 TIMES DAILY PRN
Status: ON HOLD | COMMUNITY
Start: 2022-08-25 | End: 2022-10-12

## 2022-09-09 NOTE — TELEPHONE ENCOUNTER
----- Message from Marta Caldera MA sent at 9/8/2022  3:52 PM CDT -----    ----- Message -----  From: Stacey M Lefort  Sent: 9/8/2022   2:27 PM CDT  To: Ubaldo GARNER Staff    She was told to touch base with the nurse after her procedure so that the next phase could be planned. She can be reached at [phone].  Thank you.

## 2022-09-09 NOTE — TELEPHONE ENCOUNTER
Pt reports 95% relief of the  pain in her neck, shoulders, and head from nerve block done on 09/07. She states she still has neck stiffness and limited range of motion, just no pain.   Ok to move forward with scheduling second nerve block?

## 2022-09-09 NOTE — PATIENT INSTRUCTIONS
She will continue her reflux regimen.  The barium swallow is scheduled to evaluate the hiatal hernia.  She will try to make a food diary and avoid the offending foods.  The ultrasound revealed a fatty liver.  She is started on the vitamin-E 800 units per day.  She was evaluated by the urologist for the renal cyst and it is unchanged.  She continues her current medications vitamins and minerals.

## 2022-09-12 PROBLEM — K76.0 FATTY LIVER: Status: ACTIVE | Noted: 2022-09-12

## 2022-09-12 PROBLEM — K44.9 HIATAL HERNIA: Status: ACTIVE | Noted: 2022-09-12

## 2022-09-12 NOTE — PROGRESS NOTES
Subjective:       Patient ID: Liliana Hermosillo is a 82 y.o. female.    Chief Complaint: Follow-up    Office visit.  9/9 2022  she has had pyrosis and dyspepsia.  She has been evaluated by the urologist and the cyst is unchanged.  She has a fatty liver.  I have discussed obesity and fatty liver with her.  She will try to decrease her caloric intake to reduce her weight.  She continues her current medications but will start on the vitamin-E.  She has had pyrosis and dyspepsia.  She has a hiatal hernia but she denies dysphagia aspiration.  Upper GI x-rays will be scheduled to evaluate the hiatal hernia.  She denies aspiration.  8/8/2022  [facility]  Lul Orr MD  Gastroenterology Abdominal pain, unspecified abdominal location +3 more  Dx GI Problem  Reason for Visit    Progress Notes  Lul Orr MD (Physician) · · Gastroenterology       Subjective:      Patient ID: Liliana Hermosillo is a 82 y.o. female.     Chief Complaint: GI Problem           Office Visit     10/31/2019  [facility]     Lul Orr MD        Gastroenterology        Family history of colon cancer +4 more        Dx        Follow-up; Referred by Aaareferral Self        Reason for Visit        Progress Notes  Lul Orr MD (Physician) · · Gastroenterology        Subjective:      Patient ID: Liliana Hermosillo is a 79 y.o. female.     Chief Complaint: Follow-up     His she takes the MiraLax in the fiber she has daily bowel movements.  She tried the probiotics but had some abdominal cramping.  She will avoid medications over-the-counter medications that cause side effects.  She is having daily bowel movements.  She denies GI bleeding.  She has a family history of colon cancer and colon polyps.  With her last colonoscopy she was told by her gastroenterologist that her colon mucosa was to thin and never have another it colonoscopy.  The stool studies were negative for blood.  The labs were essentially normal. She  denies hematemesis hematochezia jaundice or bleeding.  She has a history of reflux esophagitis and states that she cannot drink coffee or  chocolate in the evenings but can do so in the afternoons.  She will make a food diary and avoid the off ending foods.  She continues her vitamins and minerals she is usually doing noted daily basis.        Allergies:  Review of patient's allergies indicates:  No Known Allergies     Medications:     Current Outpatient Medications:   ·  esomeprazole (NEXIUM) 40 MG capsule, Nexium 40 mg capsule,delayed release  Take 0.5 capsules every day by oral route., Disp: , Rfl:   ·  phytonadione, vit K1, (MEPHYTON ORAL), vitamin K, Disp: , Rfl:   ·  polyethylene glycol (GLYCOLAX) 17 gram PwPk, Take 17 g by mouth 2 (two) times daily., Disp: , Rfl:   ·  rosuvastatin (CRESTOR) 20 MG tablet, rosuvastatin 20 mg tablet  Take 1 tablet twicw weekly by oral route., Disp: , Rfl:   ·  triamterene-hydrochlorothiazide 75-50 mg (MAXZIDE) 75-50 mg per tablet, , Disp: , Rfl:   ·  valACYclovir (VALTREX) 1000 MG tablet, , Disp: , Rfl:         Past Medical History:  Diagnosis        Date  ·           Atypical ductal hyperplasia, breast        ·           Chronic constipation      ·           Diverticulitis        ·           Diverticulosis      ·           Edema    ·           Hypertension      ·           Ovarian cyst                Past Surgical History:  Procedure       Laterality         Date  ·           ANKLE ARTHROSCOPY W/ OPEN REPAIR                          ·           BREAST BIOPSY       Left         ·           CHOLECYSTECTOMY                         ·           COLONOSCOPY                       ·           HYSTERECTOMY                     ·           KNEE SURGERY                      ·           UPPER GASTROINTESTINAL ENDOSCOPY                                  Review of Systems   Respiratory:        She has never used tobacco.  She denies cardiopulmonary symptoms.  She denies chronic cough or sputum  production.  She denies hemoptysis she denies dyspnea on exertion.   Cardiovascular:        Her hyperlipidemia is well controlled on the Crestor twice week.  She denies exertional chest pain or rhythm disturbances.   Gastrointestinal: Positive for constipation. Negative for abdominal distention, abdominal pain, anal bleeding, blood in stool, diarrhea and nausea.        She has a history of diverticulosis.  Recent CT scan showed diverticulosis and large amount of stool.  She has had a cholecystectomy.  She denies dysphagia hematemesis hematochezia jaundice or bleeding.   Musculoskeletal: Positive for arthralgias.          Objective:  Physical Exam   Constitutional: She is oriented to person, place, and time. She appears well-developed and well-nourished.   Well-nourished well-hydrated thin nonicteric white female   HENT:   Head: Normocephalic.   Eyes: Pupils are equal, round, and reactive to light. EOM are normal.   Neck: Normal range of motion. Neck supple. No tracheal deviation present. No thyromegaly present.   Cardiovascular: Normal rate, regular rhythm and normal heart sounds.   Pulmonary/Chest: Effort normal and breath sounds normal.   Abdominal: Soft. Bowel sounds are normal. She exhibits no distension and no mass. There is no tenderness. There is no rebound and no guarding. No hernia.   Musculoskeletal: Normal range of motion.   She can ambulate normally.  She can go from the sitting to standing position without difficulty.   Lymphadenopathy:     She has no cervical adenopathy.   Neurological: She is alert and oriented to person, place, and time. No cranial nerve deficit.   Skin: Skin is warm and dry.   Psychiatric: She has a normal mood and affect. Her behavior is normal.   Vitals reviewed.            Plan:      Family history of colon cancer     Gastroesophageal reflux disease, esophagitis presence not specified     Dyslipidemia     Constipation, unspecified constipation type     Diverticulosis      She  will continue her reflux regimen current medications.  She continues her bowel program to avoid constipation. She continues her usual activities.  And vitamins and minerals.                  Other Notes     All notes           Instructions           Follow up in about 6 months (around 4/30/2020).     She will continue the MiraLax and fiber.  She continues the Crestor twice a week.  Her lipids are well controlled.  The stool was negative for blood.  The other labs were normal.  She continues her vitamins minerals and usual activities.              After Visit Summary (Printed 10/31/2019)        Additional Documentation     SmartForms:    [facility] AMB - FALL RISK        Encounter Info:           Billing Info,     History,     Allergies,     Detailed Report        Not recorded        All Charges for This Encounter     Code    Description      Service Date   Service Provider         Modifiers         Qty  88820  [place] OFFICE/OUTPT VISIT,EST,LEVL III       10/31/2019      Lul Orr MD  S$PBB            1  [id number]        [facility] E&M-EST. PATIENT-LVL II         10/31/2019      Lul Orr MD  PBBFAC, PN      1  [id number]      [place] [facility] SHADOW E&M-EST. PATIENT-LVL II        10/31/2019      Lul Orr MD           PBBFAC          1     Level of Service     Level of Service  [place] OFFICE/OUTPT VISIT,EST,LEVL III (59004)     BestPractice Advisories     Click to view BestPractice Advisory history     AVS Reports     Date/Time       Report Action  User  10/31/2019  1:59 PM  After Visit Summary    Printed Meli Balderas RN     Encounter-Level Documents on 10/31/2019:     After Visit Summary - Document on 10/31/2019 1:59 PM by Meli Balderas RN: After Visit Summary        Orders Placed     None        Medication Changes           None        Medication List     Visit Diagnoses           Family history of colon cancer           Gastroesophageal reflux disease, esophagitis presence not specified           Dyslipidemia          "  Constipation, unspecified constipation type           Diverticulosis        Problem List        She has had a cholecystectomy and has gastroesophageal reflux.  She is on the Nexium.  She has a history of diverticulosis.  She has had occasional bowel irregularity.  She denies hematemesis hematochezia jaundice or bleeding.  She never wants another colonoscopy.  Approximately 2 weeks ago she developed increasing abdominal pain with pyrosis and dyspepsia nausea.  In the past she took the Protonix which she believes because the side effects.  She has been on the Nexium 20 mg.  Several years ago upper endoscopy in GI evaluation revealed a hiatal hernia and gastroesophageal reflux.  She was told to take the OTC Nexium.  She has tried to avoid the fried and  the fatty foods.  She has tried to be more physically active.  Possibly she states she has been too physically active with her exercises.  She has had epigastric pain with pyrosis and dyspepsia.  She has had left upper quadrant discomfort without radiation.  She has not consumed alcohol in 2 weeks.  She denies dysphagia aspiration hematemesis hematochezia jaundice or bleeding.  She is extremely depressed and anxious.  She believes that the anxiety is influential.  She lives alone.  She nourished her ill  for 5 years and then she had a male friend who "dropped dead in the room in front of me", she has a history of diverticulosis and daily daily bowel movements.  She has tried to avoid the offending foods particularly the fried in the fatty foods.  She states the Zofran caused abdominal pain with nausea although she can tolerate the Phenergan.  She has had nausea without vomiting but cannot pinpoint with a precipitating factor.        Allergies:    Review of patient's allergies indicates:  Allergen Reactions  · Gabapentin Swelling      Swelling of face  · Pantoprazole    · Monosodium glutamate Palpitations and Rash        Medications:     Current Outpatient " Medications:   ·  clobetasoL (TEMOVATE) 0.05 % cream, Apply topically every evening. 6-12 weeks, Disp: 60 g, Rfl: 2  ·  esomeprazole (NEXIUM) 20 MG capsule, Take 20 mg by mouth before breakfast., Disp: , Rfl:   ·  hydroCHLOROthiazide (MICROZIDE) 12.5 mg capsule, Take 1 capsule (12.5 mg total) by mouth once daily., Disp: 90 capsule, Rfl: 3  ·  hydrOXYzine pamoate (VISTARIL) 25 MG Cap, Take 1 capsule (25 mg total) by mouth every 8 (eight) hours as needed (anxiety)., Disp: 90 capsule, Rfl: 0  ·  ibuprofen (ADVIL,MOTRIN) 200 MG tablet, Take 400 mg by mouth every 6 (six) hours as needed for Pain., Disp: , Rfl:   ·  melatonin 1 mg Tab, Take by mouth nightly as needed., Disp: , Rfl:   ·  multivitamin (THERAGRAN) per tablet, Take 1 tablet by mouth once daily., Disp: , Rfl:   ·  ondansetron (ZOFRAN-ODT) 8 MG TbDL, Take 1 tablet (8 mg total) by mouth every 8 (eight) hours as needed (nausea)., Disp: 30 tablet, Rfl: 0  ·  rosuvastatin (CRESTOR) 20 MG tablet, Take 1 tablet (20 mg total) by mouth once daily. (Patient taking differently: Take 20 mg by mouth twice a week.), Disp: 90 tablet, Rfl: 1  ·  triamterene-hydrochlorothiazide 75-50 mg (MAXZIDE) 75-50 mg per tablet, Take 0.5 tablets by mouth once daily., Disp: , Rfl:   ·  TURMERIC ORAL, Take 1 tablet by mouth 2 (two) times a day., Disp: , Rfl:   ·  XIIDRA 5 % Dpet, , Disp: , Rfl:   ·  zolpidem (AMBIEN) 5 MG Tab, Take 1 tablet (5 mg total) by mouth nightly as needed (insomnia)., Disp: 30 tablet, Rfl: 0  ·  azithromycin (Z-JENNIFER) 250 MG tablet, Take two tablets on day 1, then 1 tablet on days 2-5. (Patient not taking: Reported on 8/8/2022), Disp: 6 tablet, Rfl: 0  ·  promethazine (PHENERGAN) 25 MG tablet, Take 1 tablet (25 mg total) by mouth every 6 (six) hours as needed for Nausea., Disp: 30 tablet, Rfl: 0  No current facility-administered medications for this visit.     Facility-Administered Medications Ordered in Other Visits:   ·  lactated ringers infusion, , Intravenous,  Continuous, Koeb Rodríguez MD       Past Medical History:  Diagnosis Date  · Atypical ductal hyperplasia, breast    · Chronic constipation    · Diverticulitis    · Diverticulosis    · Edema    · Hypertension    · Ovarian cyst            Past Surgical History:  Procedure Laterality Date  · ANKLE ARTHROSCOPY W/ OPEN REPAIR      · APPENDECTOMY   1972  · BREAST BIOPSY Left    · CHOLECYSTECTOMY      · COLONOSCOPY      · EPIDURAL STEROID INJECTION N/A 7/6/2022    Procedure: Injection, Steroid, Epidural NAIDA C7-T1;  Surgeon: Kobe Rodríguez MD;  Location: [facility] OR;  Service: Pain Management;  Laterality: N/A;  Medicare  · EPIDURAL STEROID INJECTION INTO CERVICAL SPINE N/A 3/30/2022    Procedure: Injection-steroid-epidural-cervical;  Surgeon: Kobe Rodríguez MD;  Location: [facility] OR;  Service: Pain Management;  Laterality: N/A;  c7-t1  · EYE SURGERY   2012  · HYSTERECTOMY      · KNEE SURGERY      · TONSILLECTOMY   1948  · UPPER GASTROINTESTINAL ENDOSCOPY               Review of Systems   Constitutional: Negative for appetite change, fever and unexpected weight change.   HENT: Negative for trouble swallowing.         No jaundice.   Respiratory: Negative for cough, shortness of breath and wheezing.         She is a former cigarette smoker.  Currently she does not use tobacco.  She has not consumed alcohol in 2 weeks.  She denies dysphagia aspiration hemoptysis chronic cough chronic sputum production or dyspnea   Cardiovascular: Negative for chest pain.        She denies exertional chest pain.  She denies rhythm disturbance.   Gastrointestinal: Positive for abdominal pain, constipation and nausea. Negative for abdominal distention, anal bleeding, blood in stool, diarrhea, rectal pain and vomiting.        She has history of diverticulosis.  Her constipation has responded to the current medications also the diet with increased fiber and vegetables.   Musculoskeletal: Negative for back pain and neck pain.   Skin: Positive for  color change. Negative for pallor and rash.   Neurological: Negative for dizziness, seizures, syncope, speech difficulty, weakness and numbness.   Hematological: Negative for adenopathy.   Psychiatric/Behavioral: Negative for confusion.        She has had depression and anxiety and her PCP is addressing this issue.         Objective:  Physical Exam  Vitals reviewed.   Constitutional:       Appearance: She is well-developed.      Comments: Well-nourished well-hydrated afebrile nonicteric white female.  She is sitting comfortably in the chair.  She is breathing normally.  She is normocephalic.  Pupils are normal.  She appears to be oriented x3 and can relate her history and answer questions appropriately.   HENT:      Head: Normocephalic.   Eyes:      Pupils: Pupils are equal, round, and reactive to light.   Neck:      Thyroid: No thyromegaly.      Trachea: No tracheal deviation.   Cardiovascular:      Rate and Rhythm: Normal rate and regular rhythm.      Heart sounds: Normal heart sounds.   Pulmonary:      Effort: Pulmonary effort is normal.      Breath sounds: Normal breath sounds.   Abdominal:      General: Bowel sounds are normal. There is no distension.      Palpations: Abdomen is soft. There is no mass.      Tenderness: There is abdominal tenderness. There is no guarding or rebound.      Hernia: No hernia is present.      Comments: The abdomen is soft with mild tenderness in the epigastrium and right upper quadrant.  Organomegaly or masses are not detected.  Bowel sounds are normal.   Musculoskeletal:      Cervical back: Normal range of motion and neck supple.      Comments: She ambulates slowly.  She can go from the sitting to the standing positions slowly.   Lymphadenopathy:      Cervical: No cervical adenopathy.   Skin:     General: Skin is warm and dry.   Neurological:      Mental Status: She is alert and oriented to person, place, and time.      Cranial Nerves: No cranial nerve deficit.   Psychiatric:        "  Behavior: Behavior normal.               Plan:      Abdominal pain, unspecified abdominal location  -     promethazine (PHENERGAN) 25 MG tablet; Take 1 tablet (25 mg total) by mouth every 6 (six) hours as needed for Nausea.  Dispense: 30 tablet; Refill: 0     Gastroesophageal reflux disease without esophagitis     Constipation, unspecified constipation type     Diverticulosis     She will continue her reflux regimen.  She will make a food diary and avoid the offending foods especially the fried and the fatty foods.  She continues her nutritious diet takes her vitamins and minerals and also fiber supplements and increased vegetables in the diet to produced daily bowel movements.  Ultrasound and upper GI x-rays will be scheduled.  She follows up with her other physicians.  Labs are pending.              Other Notes  All notes    Addendum Note from Marifer Meng CMA  Instructions      Follow up in about 3 years (around 8/8/2025).  She will continue her reflux regimen and current medications.  She is scheduled for ultrasound an dupper GI x-rays.  She follows up with other physicians.  She will make a food diary and avoid the offending foods.         After Visit Summary (Automatic SnapShot taken 8/9/2022)  Additional Documentation    Vitals:  /76  Pulse 82  Temp 98.6 °F (37 °C)  Ht 5' 3" (1.6 m)  Wt 63.5 kg (140 lb)  SpO2 100%  BMI 24.80 kg/m²  BSA 1.68 m²  Pain Sc 0-No pain    More Vitals  Flowsheets:  Depression Patient Health Questionnaire (PHQ2),  Anthropometrics    SmartForms:   [facility] AMB - FALL RISK ·   [facility] OPIOID RISK ASSESSMENT ADVANCED ·   [facility] ELLIOTT HM TOPICS ADVANCED    Encounter Info:  Billing Info,  History,  Allergies,  Detailed Report    Not recorded      All Charges for This Encounter    Code Description Service Date Service Provider Modifiers Qty  44488 OR OFFICE/OUTPT VISIT, EST, LEVL IV, 30-39 MIN 8/8/2022 Lul Orr MD S$PBB 1  [id number] HC OFFICE/OUTPT VISIT, EST, LEVL IV, 30-39 " MIN 8/8/2022 Lul Orr MD PBBFAC, PN 1  [id number] [facility] PBB SHADOW E&M-EST. PATIENT-LVL IV 8/8/2022 Lul Orr MD PBBFAC 1    Level of Service    Level of Service  UT OFFICE/OUTPT VISIT, EST, LEVL IV, 30-39 MIN [48835]    BestPractice Advisories    Click to view BestPractice Advisory history    AVS Reports    Date/Time Report Action User  8/9/2022 11:48 AM After Visit Summary Automatically Generated Marifer Meng CMA  8/8/2022  2:12 PM After Visit Summary Automatically Generated Lul Orr MD    Encounter-Level Documents on 08/08/2022:    After Visit Summary - Document on 8/9/2022 11:48 AM by Marifer Meng CMA: After Visit Summary  After Visit Summary - Document on 8/8/2022 2:12 PM by Lul Orr MD: After Visit Summary    Orders Placed      H. pylori antigen, stool    US Abdomen Limited    FL Esophagram Complete    All Encounter Results    Medication Changes        promethazine HCl 25 mg Oral Every 6 hours PRN      Medication List    Visit Diagnoses        Abdominal pain, unspecified abdominal location        Gastroesophageal reflux disease without esophagitis        Constipation, unspecified constipation type        Diverticulosis      Problem List        Allergies:  Review of patient's allergies indicates:   Allergen Reactions    Gabapentin Swelling     Swelling of face    Pantoprazole     Monosodium glutamate Palpitations and Rash       Medications:    Current Outpatient Medications:     hydrOXYzine pamoate (VISTARIL) 25 MG Cap, Take 1 capsule (25 mg total) by mouth every 8 (eight) hours as needed (anxiety)., Disp: 90 capsule, Rfl: 0    ibuprofen (ADVIL,MOTRIN) 200 MG tablet, Take 400 mg by mouth every 6 (six) hours as needed for Pain., Disp: , Rfl:     melatonin 1 mg Tab, Take by mouth nightly as needed., Disp: , Rfl:     multivitamin (THERAGRAN) per tablet, Take 1 tablet by mouth once daily., Disp: , Rfl:     phenazopyridine (PYRIDIUM) 200 MG tablet, Take 200 mg by mouth 3 (three) times daily as  needed., Disp: , Rfl:     rosuvastatin (CRESTOR) 20 MG tablet, Take 1 tablet (20 mg total) by mouth once daily. (Patient taking differently: Take 20 mg by mouth twice a week.), Disp: 90 tablet, Rfl: 1    TURMERIC ORAL, Take 1 tablet by mouth 2 (two) times a day., Disp: , Rfl:     clobetasoL (TEMOVATE) 0.05 % cream, Apply topically every evening. 6-12 weeks (Patient not taking: Reported on 9/9/2022), Disp: 60 g, Rfl: 2    esomeprazole (NEXIUM) 20 MG capsule, Take 20 mg by mouth before breakfast., Disp: , Rfl:     hydroCHLOROthiazide (HYDRODIURIL) 25 MG tablet, Take 1 tablet (25 mg total) by mouth once daily. (Patient not taking: Reported on 9/9/2022), Disp: 90 tablet, Rfl: 3    ondansetron (ZOFRAN-ODT) 8 MG TbDL, Take 1 tablet (8 mg total) by mouth every 8 (eight) hours as needed (nausea). (Patient not taking: No sig reported), Disp: 30 tablet, Rfl: 0    promethazine (PHENERGAN) 25 MG tablet, Take 1 tablet (25 mg total) by mouth every 6 (six) hours as needed for Nausea. (Patient not taking: No sig reported), Disp: 30 tablet, Rfl: 0    rosuvastatin (CRESTOR) 20 MG tablet, 1 tablet., Disp: , Rfl:     XIIDRA 5 % Dpet, , Disp: , Rfl:     zolpidem (AMBIEN) 5 MG Tab, Take 1 tablet (5 mg total) by mouth nightly as needed (insomnia). (Patient not taking: No sig reported), Disp: 30 tablet, Rfl: 0  No current facility-administered medications for this visit.    Facility-Administered Medications Ordered in Other Visits:     lactated ringers infusion, , Intravenous, Continuous, Kobe Rodríguez MD    lactated ringers infusion, , Intravenous, Continuous, Kobe Rodríguez MD, Last Rate: 25 mL/hr at 09/07/22 1224, New Bag at 09/07/22 1224    Past Medical History:   Diagnosis Date    Atypical ductal hyperplasia, breast     Chronic constipation     Diverticulitis     Diverticulosis     Edema     Hypertension     Ovarian cyst        Past Surgical History:   Procedure Laterality Date    ANKLE ARTHROSCOPY W/ OPEN REPAIR       APPENDECTOMY  1972    BREAST BIOPSY Left     CHOLECYSTECTOMY      COLONOSCOPY      EPIDURAL STEROID INJECTION N/A 7/6/2022    Procedure: Injection, Steroid, Epidural NAIDA C7-T1;  Surgeon: Kobe Rodríguez MD;  Location: [facility] OR;  Service: Pain Management;  Laterality: N/A;  Medicare    EPIDURAL STEROID INJECTION INTO CERVICAL SPINE N/A 3/30/2022    Procedure: Injection-steroid-epidural-cervical;  Surgeon: Kobe Rodríguez MD;  Location: [facility] OR;  Service: Pain Management;  Laterality: N/A;  c7-t1    EYE SURGERY  2012    HYSTERECTOMY      INJECTION OF NERVE Bilateral 9/7/2022    Procedure: INJECTION, NERVE;  Surgeon: Kobe Rodríguez MD;  Location: [facility] OR;  Service: Anesthesiology;  Laterality: Bilateral;  C2,3 Mbb and TON block    KNEE SURGERY      TONSILLECTOMY  1948    UPPER GASTROINTESTINAL ENDOSCOPY           Review of Systems   Constitutional:  Negative for appetite change, fever and unexpected weight change.   HENT:  Negative for trouble swallowing.         No jaundice.   Respiratory:  Negative for cough, shortness of breath and wheezing.         She is a former smoker.  She consumes minimal alcohol.  She denies dysphagia aspiration or hemoptysis.  She denies chronic cough or chronic sputum production.  She denies dyspnea on exertion but is not physically active.   Cardiovascular:  Negative for chest pain.        She denies exertional chest pain or rhythm disturbance.   Gastrointestinal:  Positive for constipation and nausea. Negative for abdominal distention, abdominal pain, anal bleeding, blood in stool, diarrhea, rectal pain and vomiting.        She has a history of constipation but her current bowel program is producing daily bowel movements.  She has had more fiber to the diet.  She is avoiding the offending foods especially the fried and fatty foods.  She has a hiatal hernia with pyrosis.  She has not had the upper GI x-rays at this point in they will be scheduled.  She has had a colonoscopy in the  past.  She never wants another colonoscopy because she was told that there is a high incidence of complications because of the thin colon.  She denies hematemesis hematochezia jaundice or bleeding.   Musculoskeletal:  Positive for arthralgias and back pain. Negative for neck pain.        She is followed in the [facility] for the chronic neck and back pain.   Skin:  Negative for pallor and rash.   Neurological:  Negative for dizziness, seizures, syncope, speech difficulty, weakness and numbness.   Hematological:  Negative for adenopathy.   Psychiatric/Behavioral:  Negative for confusion.      Objective:      Physical Exam  Vitals reviewed.   Constitutional:       Appearance: She is well-developed.      Comments: Well-nourished well-hydrated afebrile nonicteric elderly white female.  She is sitting comfortably in the chair.  She is breathing normally.  She appears to be oriented x3 and can relate her history and answer questions appropriately.   HENT:      Head: Normocephalic.   Eyes:      Pupils: Pupils are equal, round, and reactive to light.   Neck:      Thyroid: No thyromegaly.      Trachea: No tracheal deviation.   Cardiovascular:      Rate and Rhythm: Normal rate and regular rhythm.      Heart sounds: Normal heart sounds.   Pulmonary:      Effort: Pulmonary effort is normal.      Breath sounds: Normal breath sounds.   Abdominal:      General: There is no distension.      Palpations: There is no mass.      Tenderness: There is no abdominal tenderness. There is no guarding or rebound.      Hernia: No hernia is present.   Musculoskeletal:         General: Normal range of motion.      Cervical back: Normal range of motion and neck supple.      Comments: She can ambulate normally.  She can go from the sitting to the standing position without difficulty.   Lymphadenopathy:      Cervical: No cervical adenopathy.   Skin:     General: Skin is warm and dry.   Neurological:      Mental Status: She is alert and oriented to  person, place, and time.      Cranial Nerves: No cranial nerve deficit.   Psychiatric:         Behavior: Behavior normal.         Plan:       Gastroesophageal reflux disease without esophagitis    Constipation, unspecified constipation type    Diverticulosis    Hiatal hernia    Fatty liver        She will continue her reflux regimen.  The upper GI x-rays her scheduled.  She will make a food diary and avoid the offending foods.  She will ingest adequate fiber vegetables and continue her bowel program to produced daily bowel movements.  She follows up with her other physicians including [facility].  She has a fatty liver and will continue her vitamins minerals and the vitamin-E.  The prior records were requested.

## 2022-09-13 ENCOUNTER — HOSPITAL ENCOUNTER (OUTPATIENT)
Dept: RADIOLOGY | Facility: HOSPITAL | Age: 82
Discharge: HOME | End: 2022-09-13
Payer: MEDICARE

## 2022-09-13 DIAGNOSIS — E04.2 MULTINODULAR THYROID: ICD-10-CM

## 2022-09-13 DIAGNOSIS — R91.1 LUNG NODULE: ICD-10-CM

## 2022-09-13 PROCEDURE — 71250 CT THORAX DX C-: CPT | Mod: 26,,, | Performed by: RADIOLOGY

## 2022-09-13 PROCEDURE — 76536 US EXAM OF HEAD AND NECK: CPT | Mod: TC

## 2022-09-13 PROCEDURE — 71250 CT CHEST WITHOUT CONTRAST: ICD-10-PCS | Mod: 26,,, | Performed by: RADIOLOGY

## 2022-09-13 PROCEDURE — 76536 US EXAM OF HEAD AND NECK: CPT | Mod: 26,,, | Performed by: RADIOLOGY

## 2022-09-13 PROCEDURE — 76536 US SOFT TISSUE HEAD NECK THYROID: ICD-10-PCS | Mod: 26,,, | Performed by: RADIOLOGY

## 2022-09-13 PROCEDURE — 71250 CT THORAX DX C-: CPT | Mod: TC

## 2022-09-13 NOTE — TELEPHONE ENCOUNTER
Patient would like to have second block done. 95% relief from first block. Gave pt date 10/12 in [facility] with Dr. Rodríguez. Discussed all pre procedure instructions. Pt voiced understanding.

## 2022-09-19 ENCOUNTER — TELEPHONE (OUTPATIENT)
Dept: FAMILY MEDICINE | Facility: CLINIC | Age: 82
End: 2022-09-19
Payer: MEDICARE

## 2022-09-19 DIAGNOSIS — E04.2 MULTINODULAR THYROID: Primary | ICD-10-CM

## 2022-09-19 NOTE — TELEPHONE ENCOUNTER
Verified Pt. With . Discussed results of chest CT with stable lung nodules, recommend annual surveillance. Notified of thyroid u/s recommending FNA, and referral to endocrinology. Pt. Verbalized understanding.

## 2022-09-20 ENCOUNTER — TELEPHONE (OUTPATIENT)
Dept: ENDOCRINOLOGY | Facility: CLINIC | Age: 82
End: 2022-09-20
Payer: MEDICARE

## 2022-09-20 NOTE — TELEPHONE ENCOUNTER
----- Message from Summer Anton sent at 9/20/2022 10:35 AM CDT -----  .Type: Appointment Request     Caller is requesting appointment NP ASAP     Was A Appointment Solution Found When Scheduling? None     Best Call Back Number: [phone]    Additional Information: None

## 2022-10-12 ENCOUNTER — HOSPITAL ENCOUNTER (OUTPATIENT)
Facility: HOSPITAL | Age: 82
Discharge: HOME | End: 2022-10-12
Attending: ANESTHESIOLOGY | Admitting: ANESTHESIOLOGY
Payer: MEDICARE

## 2022-10-12 VITALS
OXYGEN SATURATION: 97 % | HEART RATE: 79 BPM | SYSTOLIC BLOOD PRESSURE: 110 MMHG | BODY MASS INDEX: 20.92 KG/M2 | DIASTOLIC BLOOD PRESSURE: 54 MMHG | TEMPERATURE: 98 F | RESPIRATION RATE: 16 BRPM | WEIGHT: 138 LBS | HEIGHT: 68 IN

## 2022-10-12 DIAGNOSIS — M47.892 OTHER SPONDYLOSIS, CERVICAL REGION: Primary | ICD-10-CM

## 2022-10-12 PROCEDURE — 64491 INJ PARAVERT F JNT C/T 2 LEV: CPT | Mod: 50,,, | Performed by: ANESTHESIOLOGY

## 2022-10-12 PROCEDURE — 64490 PR INJ DX/THER AGNT PARAVERT FACET JOINT,IMG GUIDE,CERV/THORAC, 1ST LEVEL: ICD-10-PCS | Mod: 50,,, | Performed by: ANESTHESIOLOGY

## 2022-10-12 PROCEDURE — 25000003 PHARM REV CODE 250: Performed by: ANESTHESIOLOGY

## 2022-10-12 PROCEDURE — 64491 PR INJ DX/THER AGNT PARAVERT FACET JOINT,IMG GUIDE,CERV/THORAC, 2ND LEVEL: ICD-10-PCS | Mod: 50,,, | Performed by: ANESTHESIOLOGY

## 2022-10-12 PROCEDURE — 63600175 PHARM REV CODE 636 W HCPCS: Performed by: ANESTHESIOLOGY

## 2022-10-12 PROCEDURE — 64491 INJ PARAVERT F JNT C/T 2 LEV: CPT | Mod: 50 | Performed by: ANESTHESIOLOGY

## 2022-10-12 PROCEDURE — 64490 INJ PARAVERT F JNT C/T 1 LEV: CPT | Mod: 50 | Performed by: ANESTHESIOLOGY

## 2022-10-12 PROCEDURE — 64490 INJ PARAVERT F JNT C/T 1 LEV: CPT | Mod: 50,,, | Performed by: ANESTHESIOLOGY

## 2022-10-12 PROCEDURE — 99152 MOD SED SAME PHYS/QHP 5/>YRS: CPT | Performed by: ANESTHESIOLOGY

## 2022-10-12 RX ORDER — SODIUM CHLORIDE, SODIUM LACTATE, POTASSIUM CHLORIDE, CALCIUM CHLORIDE 600; 310; 30; 20 MG/100ML; MG/100ML; MG/100ML; MG/100ML
INJECTION, SOLUTION INTRAVENOUS CONTINUOUS
Status: ACTIVE | OUTPATIENT
Start: 2022-10-12

## 2022-10-12 RX ORDER — MIDAZOLAM HYDROCHLORIDE 1 MG/ML
INJECTION, SOLUTION INTRAMUSCULAR; INTRAVENOUS
Status: DISCONTINUED | OUTPATIENT
Start: 2022-10-12 | End: 2022-10-12 | Stop reason: HOSPADM

## 2022-10-12 RX ORDER — BUPIVACAINE HYDROCHLORIDE 5 MG/ML
INJECTION, SOLUTION EPIDURAL; INTRACAUDAL
Status: DISCONTINUED | OUTPATIENT
Start: 2022-10-12 | End: 2022-10-12 | Stop reason: HOSPADM

## 2022-10-12 RX ORDER — LIDOCAINE HYDROCHLORIDE 10 MG/ML
INJECTION, SOLUTION EPIDURAL; INFILTRATION; INTRACAUDAL; PERINEURAL
Status: DISCONTINUED | OUTPATIENT
Start: 2022-10-12 | End: 2022-10-12 | Stop reason: HOSPADM

## 2022-10-12 RX ADMIN — SODIUM CHLORIDE, SODIUM LACTATE, POTASSIUM CHLORIDE, AND CALCIUM CHLORIDE: .6; .31; .03; .02 INJECTION, SOLUTION INTRAVENOUS at 02:10

## 2022-10-12 NOTE — DISCHARGE INSTRUCTIONS
[phone]  [facility]   [phone]  [facility]      [phone]    Managing nausea    Some people have an upset stomach after surgery. This is often because of anesthesia, pain, or pain medicine, or the stress of surgery. These tips will help you handle nausea and eat healthy foods as you get better. If you were on a special food plan before surgery, ask your healthcare provider if you should follow it while you get better. These tips may help:  Do not push yourself to eat. Your body will tell you when to eat and how much.  Start off with clear liquids and soup. They are easier to digest.  Next try semi-solid foods, such as mashed potatoes, applesauce, and gelatin, as you feel ready.  Slowly move to solid foods. Dont eat fatty, rich, or spicy foods at first.  Do not force yourself to have 3 large meals a day. Instead eat smaller amounts more often.  Take pain medicines with a small amount of solid food, such as crackers or toast, to avoid nausea.

## 2022-10-12 NOTE — DISCHARGE SUMMARY
[facility]  Discharge Note  Short Stay    Procedure(s) (LRB):  INJECTION, NERVE (Bilateral)      OUTCOME: Patient tolerated treatment/procedure well without complication and is now ready for discharge.    DISPOSITION: Home or Self Care    FINAL DIAGNOSIS:  Other spondylosis, cervical    FOLLOWUP: In clinic    DISCHARGE INSTRUCTIONS:    Discharge Procedure Orders   Diet general     Call MD for:  temperature >100.4     Call MD for:  persistent nausea and vomiting     Call MD for:  severe uncontrolled pain     Call MD for:  difficulty breathing, headache or visual disturbances     Call MD for:  redness, tenderness, or signs of infection (pain, swelling, redness, odor or green/yellow discharge around incision site)     Call MD for:  hives     Call MD for:  persistent dizziness or light-headedness     Call MD for:  extreme fatigue        TIME SPENT ON DISCHARGE: 30 minutes

## 2022-10-12 NOTE — H&P
"FOLLOW UP NOTE:     CHIEF COMPLAINT: neck pain    INTERVAL HISTORY OF PRESENT ILLNESS: Liliana Hermosillo is a 82 y.o. female who presents for  Cervical medial branch block of the C2 and C3 medial branch nerves on the bilateral-side utilizing fluoroscopy (corresponding to the C2-C3 facet joint + TON). The patient denies of any significant changes in her health since her last appointment. The patient also denies of any changes in the character of her pain since her last appointment.     ROS:  Review of Systems   Constitutional:  Negative for chills and fever.   HENT:  Negative for sore throat.    Eyes:  Negative for visual disturbance.   Respiratory:  Negative for shortness of breath.    Cardiovascular:  Negative for chest pain.   Gastrointestinal:  Negative for nausea and vomiting.   Genitourinary:  Negative for difficulty urinating.   Musculoskeletal:  Positive for neck pain.   Skin:  Negative for rash.   Allergic/Immunologic: Negative for immunocompromised state.   Neurological:  Positive for headaches. Negative for syncope.   Hematological:  Does not bruise/bleed easily.   Psychiatric/Behavioral:  Negative for suicidal ideas.       MEDICAL, SURGICAL, FAMILY, SOCIAL HX: reviewed    MEDICATIONS/ALLERGIES: reviewed    PHYSICAL EXAM:    VITALS: Vitals reviewed.   Vitals:    10/12/22 1354   BP: 104/73   Pulse: 77   Resp: 19   Temp: 98.9 °F (37.2 °C)   TempSrc: Oral   SpO2: 95%   Weight: 62.6 kg (138 lb)   Height: 5' 3" (1.6 m)       Physical Exam  Vitals and nursing note reviewed.   Constitutional:       Appearance: Normal appearance. She is not toxic-appearing or diaphoretic.   HENT:      Head: Normocephalic and atraumatic.   Eyes:      General:         Right eye: No discharge.         Left eye: No discharge.      Extraocular Movements: Extraocular movements intact.      Conjunctiva/sclera: Conjunctivae normal.   Cardiovascular:      Rate and Rhythm: Normal rate.   Pulmonary:      Effort: Pulmonary effort is normal. No " respiratory distress.      Breath sounds: Normal breath sounds.   Abdominal:      Palpations: Abdomen is soft.   Skin:     General: Skin is warm and dry.      Findings: No rash.   Neurological:      Mental Status: She is alert and oriented to person, place, and time.   Psychiatric:         Mood and Affect: Mood and affect normal.         Cognition and Memory: Memory normal.         Judgment: Judgment normal.        EXTREMITIES:    Gen: No cyanosis, edema, varicosities, or tenderness to palpation BLE   Skin: Warm, pink, dry, no rashes, no lesions BLE   Strength: 5/5 motor strength BLE   ROM: hips, knees and ankles without pain or instability.     NEUROLOGICAL:    Gen: No clonus or spasticity.   Gait: Normal without antalgic lean   DTR's: 2+ in bilateral patellar, and ankle   BABINSKI: Absent bilaterally  Sensory: Intact to light touch and proprioception BLE    ASSESSMENT: Liliana Hermosillo is a 82 y.o. female who presents for  Cervical medial branch block of the C2 and C3 medial branch nerves on the bilateral-side utilizing fluoroscopy (corresponding to the C2-C3 facet joint + TON).     PLAN:  Proceed with  Cervical medial branch block of the C2 and C3 medial branch nerves on the bilateral-side utilizing fluoroscopy (corresponding to the C2-C3 facet joint + TON) as previously discussed.    This patient has been cleared for surgery in an ambulatory surgical facility    ASA 3,  Mallampatti Score 3  No history of anesthetic complications  Plan for RN IV sedation    Kobe Rodríguez MD  Pain Management

## 2022-10-12 NOTE — OP NOTE
DATE: 10/12/2022     PROCEDURE:  Cervical medial branch block of the C2 and C3 medial branch nerves on the bilateral-side utilizing fluoroscopy (corresponding to the C2-C3 facet joint + TON)     DIAGNOSIS:  Other cervical spondylosis     PHYSICIAN: Kobe Rodríguez MD     MEDICATIONS INJECTED:  0.5% bupivicaine, 0.5ml at each level.     LOCAL ANESTHETIC USED:   1% lidocaine, 1ml at each level.     SEDATION MEDICATIONS: RN IV sedation     ESTIMATED BLOOD LOSS:  none     COMPLICATIONS:  none     TECHNIQUE : A time-out was taken to identify patient and procedure side prior to starting the procedure.  The patient was positioned in the prone position. The patient was prepped and draped in the usual sterile fashion using ChloraPrep and sterile towels.  The level was determined under fluoroscopic guidance using a slightly posteriorly oblique view.   Local anesthetic was infiltrated superficially at the skin level.  Then, a 3.5 inch 25 gauge needle was inserted to the anatomic location of the midsection of the lateral masses of C2 and C3 on the bilateral side(s). A cross table view was then taken to ensure that needles did not cross into neural foramina.  The above noted medication was then injected. The patient tolerated the procedure well.     The patient was monitored after the procedure. Patient given a pain diary to document progress after procedure.  If found to have greater than a 50% recovery and so will be scheduled for a radiofrequency ablation of the corresponding nerves.      The patient was given post procedure and discharge instructions to follow at home. The patient was discharged in a stable condition

## 2022-10-12 NOTE — PLAN OF CARE
Has met unit/department guidelines for discharge from each phase of the post procedure continuum. Leaving floor per w/c with RN. RASHEED x3. Resp even and unlabored room air. No distress noted. Tolerating Po fluids without c/o nausea/vomiting. All personal belongings returned to pt.

## 2022-10-21 ENCOUNTER — TELEPHONE (OUTPATIENT)
Dept: SPINE | Facility: CLINIC | Age: 82
End: 2022-10-21
Payer: MEDICARE

## 2022-10-21 DIAGNOSIS — M47.892 OTHER SPONDYLOSIS, CERVICAL REGION: ICD-10-CM

## 2022-10-21 DIAGNOSIS — M50.30 DDD (DEGENERATIVE DISC DISEASE), CERVICAL: Primary | ICD-10-CM

## 2022-10-21 NOTE — TELEPHONE ENCOUNTER
----- Message from Stacey M Lefort sent at 10/21/2022  9:31 AM CDT -----  Pt is having good results from the last procedure. She wants to know when she can get on the schedule for the ablation. She would like a callback at [phone].  Thank you.

## 2022-10-21 NOTE — TELEPHONE ENCOUNTER
Pt had second MBB on 10/12. She reports 90% pain relief and would like to move froward with RFA.   Gave procedure date of 11/9 with Dr. Rodríguez in [facility]. Explained pre procedure instructions. Pt voiced understanding.

## 2022-11-09 ENCOUNTER — HOSPITAL ENCOUNTER (OUTPATIENT)
Facility: HOSPITAL | Age: 82
Discharge: HOME | End: 2022-11-09
Attending: ANESTHESIOLOGY | Admitting: ANESTHESIOLOGY
Payer: MEDICARE

## 2022-11-09 VITALS
SYSTOLIC BLOOD PRESSURE: 149 MMHG | TEMPERATURE: 98 F | RESPIRATION RATE: 16 BRPM | DIASTOLIC BLOOD PRESSURE: 73 MMHG | HEART RATE: 72 BPM | OXYGEN SATURATION: 95 %

## 2022-11-09 DIAGNOSIS — M47.892 OTHER SPONDYLOSIS, CERVICAL REGION: Primary | ICD-10-CM

## 2022-11-09 PROCEDURE — 64634 PR DESTROY C/TH FACET JNT ADDL: ICD-10-PCS | Mod: 50,,, | Performed by: ANESTHESIOLOGY

## 2022-11-09 PROCEDURE — 99152 PR MOD CONSCIOUS SEDATION, SAME PHYS, 5+ YRS, FIRST 15 MIN: ICD-10-PCS | Mod: ,,, | Performed by: ANESTHESIOLOGY

## 2022-11-09 PROCEDURE — 64633 PR DESTROY CERV/THOR FACET JNT: ICD-10-PCS | Mod: 50,,, | Performed by: ANESTHESIOLOGY

## 2022-11-09 PROCEDURE — 63600175 PHARM REV CODE 636 W HCPCS: Performed by: ANESTHESIOLOGY

## 2022-11-09 PROCEDURE — 64633 DESTROY CERV/THOR FACET JNT: CPT | Mod: 50 | Performed by: ANESTHESIOLOGY

## 2022-11-09 PROCEDURE — 64634 DESTROY C/TH FACET JNT ADDL: CPT | Mod: 50,,, | Performed by: ANESTHESIOLOGY

## 2022-11-09 PROCEDURE — 25000003 PHARM REV CODE 250: Performed by: ANESTHESIOLOGY

## 2022-11-09 PROCEDURE — 64633 DESTROY CERV/THOR FACET JNT: CPT | Mod: 50,,, | Performed by: ANESTHESIOLOGY

## 2022-11-09 PROCEDURE — 99152 MOD SED SAME PHYS/QHP 5/>YRS: CPT | Mod: ,,, | Performed by: ANESTHESIOLOGY

## 2022-11-09 PROCEDURE — 64634 DESTROY C/TH FACET JNT ADDL: CPT | Mod: 50 | Performed by: ANESTHESIOLOGY

## 2022-11-09 RX ORDER — BUPIVACAINE HYDROCHLORIDE 5 MG/ML
INJECTION, SOLUTION EPIDURAL; INTRACAUDAL
Status: DISCONTINUED | OUTPATIENT
Start: 2022-11-09 | End: 2022-11-09 | Stop reason: HOSPADM

## 2022-11-09 RX ORDER — HYDROCHLOROTHIAZIDE 25 MG/1
25 TABLET ORAL DAILY
COMMUNITY
End: 2023-11-20 | Stop reason: SDUPTHER

## 2022-11-09 RX ORDER — FENTANYL CITRATE 50 UG/ML
INJECTION, SOLUTION INTRAMUSCULAR; INTRAVENOUS
Status: DISCONTINUED | OUTPATIENT
Start: 2022-11-09 | End: 2022-11-09 | Stop reason: HOSPADM

## 2022-11-09 RX ORDER — DEXAMETHASONE SODIUM PHOSPHATE 4 MG/ML
INJECTION, SOLUTION INTRA-ARTICULAR; INTRALESIONAL; INTRAMUSCULAR; INTRAVENOUS; SOFT TISSUE
Status: DISCONTINUED | OUTPATIENT
Start: 2022-11-09 | End: 2022-11-09 | Stop reason: HOSPADM

## 2022-11-09 RX ORDER — SODIUM CHLORIDE, SODIUM LACTATE, POTASSIUM CHLORIDE, CALCIUM CHLORIDE 600; 310; 30; 20 MG/100ML; MG/100ML; MG/100ML; MG/100ML
INJECTION, SOLUTION INTRAVENOUS CONTINUOUS
Status: ACTIVE | OUTPATIENT
Start: 2022-11-09

## 2022-11-09 RX ORDER — LIDOCAINE HYDROCHLORIDE 20 MG/ML
INJECTION, SOLUTION INFILTRATION; PERINEURAL
Status: DISCONTINUED | OUTPATIENT
Start: 2022-11-09 | End: 2022-11-09 | Stop reason: HOSPADM

## 2022-11-09 RX ORDER — MIDAZOLAM HYDROCHLORIDE 1 MG/ML
INJECTION, SOLUTION INTRAMUSCULAR; INTRAVENOUS
Status: DISCONTINUED | OUTPATIENT
Start: 2022-11-09 | End: 2022-11-09 | Stop reason: HOSPADM

## 2022-11-09 NOTE — DISCHARGE SUMMARY
[facility] Surgery  Discharge Note  Short Stay    Procedure(s) (LRB):  Radiofrequency Ablation C2-C3 and TON (Bilateral)      OUTCOME: Patient tolerated treatment/procedure well without complication and is now ready for discharge.    DISPOSITION: Home or Self Care    FINAL DIAGNOSIS:  Other spondylosis, cervical    FOLLOWUP: In clinic    DISCHARGE INSTRUCTIONS:    Discharge Procedure Orders   Diet general     Call MD for:  temperature >100.4     Call MD for:  persistent nausea and vomiting     Call MD for:  severe uncontrolled pain     Call MD for:  difficulty breathing, headache or visual disturbances     Call MD for:  redness, tenderness, or signs of infection (pain, swelling, redness, odor or green/yellow discharge around incision site)     Call MD for:  hives     Call MD for:  persistent dizziness or light-headedness     Call MD for:  extreme fatigue        TIME SPENT ON DISCHARGE: 30 minutes

## 2022-11-09 NOTE — H&P
FOLLOW UP NOTE:     CHIEF COMPLAINT: neck pain    INTERVAL HISTORY OF PRESENT ILLNESS: Liliana Hermosillo is a 82 y.o. female who presents for bilateral C2 and C3 medial branch nerves and third occipital nerves radiofrequency ablation. The patient denies of any significant changes in her health since her last appointment. The patient also denies of any changes in the character of her pain since her last appointment.     ROS:  Review of Systems   Constitutional:  Negative for chills and fever.   HENT:  Negative for sore throat.    Eyes:  Negative for visual disturbance.   Respiratory:  Negative for shortness of breath.    Cardiovascular:  Negative for chest pain.   Gastrointestinal:  Negative for nausea and vomiting.   Genitourinary:  Negative for difficulty urinating.   Musculoskeletal:  Positive for back pain and neck pain.   Skin:  Negative for rash.   Allergic/Immunologic: Negative for immunocompromised state.   Neurological:  Negative for syncope.   Hematological:  Does not bruise/bleed easily.   Psychiatric/Behavioral:  Negative for suicidal ideas.       MEDICAL, SURGICAL, FAMILY, SOCIAL HX: reviewed    MEDICATIONS/ALLERGIES: reviewed    PHYSICAL EXAM:    VITALS: Vitals reviewed.   Vitals:    11/09/22 1346 11/09/22 1357 11/09/22 1404 11/09/22 1412   BP: (!) 122/57 (!) 124/58 113/75 128/67   Pulse:  80 74 73   Resp: (!) 22  10 12   Temp:       SpO2: 100% 100% 100% 100%       Physical Exam  Vitals and nursing note reviewed.   Constitutional:       Appearance: Normal appearance. She is not toxic-appearing or diaphoretic.   HENT:      Head: Normocephalic and atraumatic.   Eyes:      General:         Right eye: No discharge.         Left eye: No discharge.      Extraocular Movements: Extraocular movements intact.      Conjunctiva/sclera: Conjunctivae normal.   Cardiovascular:      Rate and Rhythm: Normal rate.   Pulmonary:      Effort: Pulmonary effort is normal. No respiratory distress.      Breath sounds: Normal  breath sounds.   Abdominal:      Palpations: Abdomen is soft.   Skin:     General: Skin is warm and dry.      Findings: No rash.   Neurological:      Mental Status: She is alert and oriented to person, place, and time.   Psychiatric:         Mood and Affect: Mood and affect normal.         Cognition and Memory: Memory normal.         Judgment: Judgment normal.        EXTREMITIES:    Gen: No cyanosis, edema, varicosities, or tenderness to palpation BLE   Skin: Warm, pink, dry, no rashes, no lesions BLE   Strength: 5/5 motor strength BLE   ROM: hips, knees and ankles without pain or instability.     NEUROLOGICAL:    Gen: No clonus or spasticity.   Gait: Normal without antalgic lean   DTR's: 2+ in bilateral patellar, and ankle   BABINSKI: Absent bilaterally  Sensory: Intact to light touch and proprioception BLE    ASSESSMENT: Liliana Hermosillo is a 82 y.o. female who presents for bilateral C2 and C3 medial branch nerves and third occipital nerves radiofrequency ablation.     PLAN:  Proceed with bilateral C2 and C3 medial branch nerves and third occipital nerves radiofrequency ablation as previously discussed.    This patient has been cleared for surgery in an ambulatory surgical facility    ASA 3,  Mallampatti Score 3  No history of anesthetic complications  Plan for RN IV sedation    Kobe Rodríguez MD  Pain Management

## 2022-11-09 NOTE — OP NOTE
PROCEDURE DATE: 11/9/2022     PROCEDURE:  Radiofrequency ablation of the C2 and C3 medial branch nerves and third occipital nerve on the bilateral-side under fluoroscopy     DIAGNOSIS:  Other Cervical spondylosis  Post Op Diagnosis: Same     PHYSICIAN: Kobe Rodríguez MD     MEDICATIONS INJECTED:  From a mixture of 3 ml of 0.25% bupivacaine and 10 mg of dexamethasone, 0.5 ml of this solution was injected at each level.     LOCAL ANESTHETIC USED:   1ml of lidocaine 1% at each level     SEDATION MEDICATIONS: Under my direction supervision, intravenous moderate sedation was administered during the course of this procedure, with continuous monitoring of hemodynamic parameters. Total time of sedation was 35 minutes.      ESTIMATED BLOOD LOSS:  none     COMPLICATIONS:  none     TECHNIQUE:   A time-out taken to identify patient and procedure side prior to starting the procedure.  The patient was positioned in the left lateral decubitus position. The patient was prepped and draped in the usual sterile fashion using ChloraPrep and sterile towels.  The levels were determined under fluoroscopic guidance using a slightly posteriorly oblique view.   Local anesthetic was infiltrated superficially at the skin.  Then a 100 mm 20g bent tip RF needle was inserted to the anatomic location of the midsection of the lateral masses of C2 and C3 on the right side(s). A cross table view was then taken to ensure that needles did not cross into neural foramina.  Impedance was less than 800 ohms at each level. Motor stimulation up to 2 volts confirmed there was no nerve root involvement at each level. Medication was then injected slowly.  Ablation was done per level utilizing radiofrequency generator at 80°C for 60 seconds.      The patient was then re-positioned in the right lateral decubitus position. The patient was prepped and draped in the usual sterile fashion using ChloraPrep and sterile towels.  The levels were determined under  fluoroscopic guidance using a slightly posteriorly oblique view.   Local anesthetic was infiltrated superficially at the skin.  Then a 100 mm 20g bent tip RF needle was inserted to the anatomic location of the midsection of the lateral masses of C2 and C3 on the left side(s). A cross table view was then taken to ensure that needles did not cross into neural foramina.  Impedance was less than 800 ohms at each level. Motor stimulation up to 2 volts confirmed there was no nerve root involvement at each level. Medication was then injected slowly.  Ablation was done per level utilizing radiofrequency generator at 80°C for 60 seconds. The patient tolerated the procedure well.     The patient was monitored after the procedure.  Patient was given post procedure and discharge instructions to follow at home.  The patient was discharged in a stable condition

## 2022-11-15 ENCOUNTER — LAB VISIT (OUTPATIENT)
Dept: LAB | Facility: HOSPITAL | Age: 82
End: 2022-11-15
Attending: SPECIALIST
Payer: MEDICARE

## 2022-11-15 DIAGNOSIS — E78.00 PURE HYPERCHOLESTEROLEMIA: Primary | ICD-10-CM

## 2022-11-15 LAB
ALBUMIN SERPL BCP-MCNC: 4 G/DL (ref 3.5–5.2)
ALP SERPL-CCNC: 59 U/L (ref 55–135)
ALT SERPL W/O P-5'-P-CCNC: 16 U/L (ref 10–44)
AST SERPL-CCNC: 19 U/L (ref 10–40)
BILIRUB DIRECT SERPL-MCNC: 0.3 MG/DL (ref 0.1–0.3)
BILIRUB SERPL-MCNC: 0.7 MG/DL (ref 0.1–1)
CHOLEST SERPL-MCNC: 285 MG/DL (ref 120–199)
CHOLEST/HDLC SERPL: 4.4 {RATIO} (ref 2–5)
HDLC SERPL-MCNC: 65 MG/DL (ref 40–75)
HDLC SERPL: 22.8 % (ref 20–50)
LDLC SERPL CALC-MCNC: 188.6 MG/DL (ref 63–159)
NONHDLC SERPL-MCNC: 220 MG/DL
PROT SERPL-MCNC: 6.7 G/DL (ref 6–8.4)
TRIGL SERPL-MCNC: 157 MG/DL (ref 30–150)

## 2022-11-15 PROCEDURE — 80061 LIPID PANEL: CPT | Performed by: SPECIALIST

## 2022-11-15 PROCEDURE — 36415 COLL VENOUS BLD VENIPUNCTURE: CPT | Performed by: SPECIALIST

## 2022-11-15 PROCEDURE — 80076 HEPATIC FUNCTION PANEL: CPT | Performed by: SPECIALIST

## 2022-11-17 ENCOUNTER — TELEPHONE (OUTPATIENT)
Dept: PAIN MEDICINE | Facility: CLINIC | Age: 82
End: 2022-11-17
Payer: MEDICARE

## 2022-11-17 DIAGNOSIS — M47.892 OTHER SPONDYLOSIS, CERVICAL REGION: Primary | ICD-10-CM

## 2022-11-17 DIAGNOSIS — M50.30 DDD (DEGENERATIVE DISC DISEASE), CERVICAL: ICD-10-CM

## 2022-11-17 DIAGNOSIS — M54.12 CERVICAL RADICULITIS: ICD-10-CM

## 2022-11-17 RX ORDER — HYDROCODONE BITARTRATE AND ACETAMINOPHEN 5; 325 MG/1; MG/1
1 TABLET ORAL EVERY 6 HOURS PRN
Qty: 30 TABLET | Refills: 0 | Status: SHIPPED | OUTPATIENT
Start: 2022-11-17 | End: 2023-10-23

## 2022-11-17 NOTE — TELEPHONE ENCOUNTER
----- Message from Garcia Badillo sent at 11/17/2022  2:19 PM CST -----  Contact: self  Type:  Needs Medical Advice    Who Called: Pt  Symptoms (please be specific): pain in back of head after ablation on her neck   How long has patient had these symptoms:  since 11/09/2022  Best Call Back Number: [phone]   Additional Information: Please call pt to discuss symptoms.

## 2022-12-12 ENCOUNTER — TELEPHONE (OUTPATIENT)
Dept: ENDOCRINOLOGY | Facility: CLINIC | Age: 82
End: 2022-12-12
Payer: MEDICARE

## 2022-12-12 NOTE — TELEPHONE ENCOUNTER
----- Message from Clementine Capps sent at 12/12/2022 12:17 PM CST -----  Contact: [phone]  Type: Needs Medical Advice  Who Called:  Pt     Best Call Back Number: [phone]    Additional Information: Pt stated if she is not having [facility] she would like to change appt to a phone call consultation if possible. Pt stated it is to hard for her to drive out to [place] twice. Pls call back and advise

## 2022-12-15 ENCOUNTER — OFFICE VISIT (OUTPATIENT)
Dept: ENDOCRINOLOGY | Facility: CLINIC | Age: 82
End: 2022-12-15
Payer: MEDICARE

## 2022-12-15 VITALS
TEMPERATURE: 98 F | HEIGHT: 68 IN | HEART RATE: 72 BPM | WEIGHT: 143.94 LBS | BODY MASS INDEX: 21.81 KG/M2 | SYSTOLIC BLOOD PRESSURE: 114 MMHG | OXYGEN SATURATION: 97 % | DIASTOLIC BLOOD PRESSURE: 74 MMHG

## 2022-12-15 DIAGNOSIS — M81.0 AGE-RELATED OSTEOPOROSIS WITHOUT CURRENT PATHOLOGICAL FRACTURE: ICD-10-CM

## 2022-12-15 DIAGNOSIS — E04.2 MULTIPLE THYROID NODULES: Primary | ICD-10-CM

## 2022-12-15 PROCEDURE — 99215 OFFICE O/P EST HI 40 MIN: CPT | Mod: PBBFAC,PO | Performed by: INTERNAL MEDICINE

## 2022-12-15 PROCEDURE — 99999 PR PBB SHADOW E&M-EST. PATIENT-LVL V: ICD-10-PCS | Mod: PBBFAC,,, | Performed by: INTERNAL MEDICINE

## 2022-12-15 PROCEDURE — 99204 OFFICE O/P NEW MOD 45 MIN: CPT | Mod: S$PBB,,, | Performed by: INTERNAL MEDICINE

## 2022-12-15 PROCEDURE — 99204 PR OFFICE/OUTPT VISIT, NEW, LEVL IV, 45-59 MIN: ICD-10-PCS | Mod: S$PBB,,, | Performed by: INTERNAL MEDICINE

## 2022-12-15 PROCEDURE — 99999 PR PBB SHADOW E&M-EST. PATIENT-LVL V: CPT | Mod: PBBFAC,,, | Performed by: INTERNAL MEDICINE

## 2022-12-15 RX ORDER — HYDROGEN PEROXIDE 3 %
20 SOLUTION, NON-ORAL MISCELLANEOUS
COMMUNITY

## 2022-12-15 NOTE — ASSESSMENT & PLAN NOTE
Discussed with pt diagnosis of osteoporosis.     Reviewed basics of bone quantity versus quality  Reassuring she is not fracturing     Risks include  race, menopause, remote hx smoking, ETOH    Continue vitamin intake  Avoid falls  Try for weight bearing exercises as tolerated    Treatment options and potential side effects discussed for PO bisphosphonates, reclast, Denosumab, and Teriparatide.    Low risk for ONJ and atypical fractures reviewed and discussed. Alerted that if dental work needs to be done it should be done prior to initiating therapy    Pt not interested at this time.  Discussed evista could be an option.   think about it
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
## 2022-12-15 NOTE — PATIENT INSTRUCTIONS
For bones, think about reclast, prolia, or evista.   Avoid falls.    For thyroid, recommend FNA when able.

## 2022-12-15 NOTE — ASSESSMENT & PLAN NOTE
TR4 2 cm nodule   others small   occasional potentially compressive symptoms but not often, may be unrelated    Recommend FNA for largest nodule. Pt open to that  Discussed potential outcomes, benign, FLUS/AUS/etc, cancer.   reviewed next steps   benign -> repeat US next year   FLUS/AUS/other -> repeat FNA vs try for molecular tests   cancer -> thyroidectomy, levothyroxine, monitoring further after that depending on surgery results

## 2023-01-12 ENCOUNTER — HOSPITAL ENCOUNTER (OUTPATIENT)
Dept: RADIOLOGY | Facility: HOSPITAL | Age: 83
Discharge: HOME | End: 2023-01-12
Attending: INTERNAL MEDICINE
Payer: MEDICARE

## 2023-01-12 DIAGNOSIS — E04.2 MULTIPLE THYROID NODULES: ICD-10-CM

## 2023-01-12 PROCEDURE — 76536 US EXAM OF HEAD AND NECK: CPT | Mod: 26,,, | Performed by: RADIOLOGY

## 2023-01-12 PROCEDURE — 76536 US SOFT TISSUE HEAD NECK THYROID: ICD-10-PCS | Mod: 26,,, | Performed by: RADIOLOGY

## 2023-01-12 PROCEDURE — 76536 US EXAM OF HEAD AND NECK: CPT | Mod: TC

## 2023-03-18 ENCOUNTER — CLINICAL SUPPORT (OUTPATIENT)
Dept: URGENT CARE | Facility: CLINIC | Age: 83
End: 2023-03-18
Payer: MEDICARE

## 2023-03-18 VITALS
RESPIRATION RATE: 17 BRPM | TEMPERATURE: 98 F | OXYGEN SATURATION: 98 % | DIASTOLIC BLOOD PRESSURE: 71 MMHG | WEIGHT: 143 LBS | BODY MASS INDEX: 21.67 KG/M2 | HEIGHT: 68 IN | HEART RATE: 82 BPM | SYSTOLIC BLOOD PRESSURE: 117 MMHG

## 2023-03-18 DIAGNOSIS — J32.9 BACTERIAL SINUSITIS: Primary | ICD-10-CM

## 2023-03-18 DIAGNOSIS — B96.89 BACTERIAL SINUSITIS: Primary | ICD-10-CM

## 2023-03-18 DIAGNOSIS — R68.83 CHILLS: ICD-10-CM

## 2023-03-18 LAB
CTP QC/QA: YES
CTP QC/QA: YES
POC MOLECULAR INFLUENZA A AGN: NEGATIVE
POC MOLECULAR INFLUENZA B AGN: NEGATIVE
SARS-COV-2 AG RESP QL IA.RAPID: NEGATIVE

## 2023-03-18 PROCEDURE — 87502 POCT INFLUENZA A/B MOLECULAR: ICD-10-PCS | Mod: QW,,, | Performed by: NURSE PRACTITIONER

## 2023-03-18 PROCEDURE — 87502 INFLUENZA DNA AMP PROBE: CPT | Mod: QW,,, | Performed by: NURSE PRACTITIONER

## 2023-03-18 PROCEDURE — U0002 COVID-19 LAB TEST NON-CDC: HCPCS | Mod: QW,CR,, | Performed by: NURSE PRACTITIONER

## 2023-03-18 PROCEDURE — 99212 OFFICE O/P EST SF 10 MIN: CPT | Mod: CR,,, | Performed by: NURSE PRACTITIONER

## 2023-03-18 PROCEDURE — U0002 SARS CORONAVIRUS 2 ANTIGEN POCT, MANUAL READ: ICD-10-PCS | Mod: QW,CR,, | Performed by: NURSE PRACTITIONER

## 2023-03-18 PROCEDURE — 99212 PR OFFICE/OUTPT VISIT, EST, LEVL II, 10-19 MIN: ICD-10-PCS | Mod: CR,,, | Performed by: NURSE PRACTITIONER

## 2023-03-18 RX ORDER — CEPHALEXIN 500 MG/1
500 CAPSULE ORAL EVERY 6 HOURS
Qty: 28 CAPSULE | Refills: 0 | Status: SHIPPED | OUTPATIENT
Start: 2023-03-18 | End: 2023-03-25

## 2023-03-18 NOTE — PROGRESS NOTES
"Subjective:       Patient ID: Liliana Hermosillo is a 82 y.o. female.    Vitals:  height is 5' 7.99" (1.727 m) and weight is 64.9 kg (143 lb). Her oral temperature is 98.3 °F (36.8 °C). Her blood pressure is 117/71 and her pulse is 82. Her respiration is 17 and oxygen saturation is 98%.     Chief Complaint: Fever    82 y.o female presents today c/o sinus congestion/pressure, body aches, chills and fever that started last night.   Patient also c/o bilateral eye redness.       Fever   This is a new problem. The current episode started yesterday. Her temperature was unmeasured prior to arrival. Pertinent negatives include no abdominal pain, chest pain, congestion, coughing, diarrhea, ear pain, headaches, muscle aches, nausea, rash, sleepiness, sore throat, urinary pain, vomiting or wheezing.     Constitution: Positive for fever.   HENT:  Negative for ear pain, congestion and sore throat.    Cardiovascular:  Negative for chest pain.   Respiratory:  Negative for cough and wheezing.    Gastrointestinal:  Negative for abdominal pain, nausea, vomiting and diarrhea.   Genitourinary:  Negative for dysuria.   Skin:  Negative for rash.   Neurological:  Negative for headaches.     Objective:      Physical Exam   Constitutional: She is oriented to person, place, and time. normal  HENT:   Head: Normocephalic and atraumatic.   Ears:   Right Ear: Tympanic membrane, external ear and ear canal normal.   Left Ear: Tympanic membrane, external ear and ear canal normal.   Nose: Right sinus exhibits maxillary sinus tenderness. Left sinus exhibits maxillary sinus tenderness.   Mouth/Throat: Uvula is midline, oropharynx is clear and moist and mucous membranes are normal. Mucous membranes are moist. Oropharynx is clear.   Eyes: Conjunctivae are normal. Pupils are equal, round, and reactive to light. Extraocular movement intact   Neck: Neck supple.   Cardiovascular: Normal rate, regular rhythm, normal heart sounds and normal pulses. "   Pulmonary/Chest: Effort normal and breath sounds normal.   Abdominal: Normal appearance.   Musculoskeletal: Normal range of motion.         General: Normal range of motion.   Neurological: She is alert and oriented to person, place, and time.   Skin: Skin is warm and dry.   Psychiatric: Her behavior is normal. Mood normal.   Vitals reviewed.      Results for orders placed or performed in visit on 03/18/23   SARS Coronavirus 2 Antigen, POCT Manual Read   Result Value Ref Range    SARS Coronavirus 2 Antigen Negative Negative     Acceptable Yes    POCT Influenza A/B MOLECULAR   Result Value Ref Range    POC Molecular Influenza A Ag Negative Negative, Not Reported    POC Molecular Influenza B Ag Negative Negative, Not Reported     Acceptable Yes     No results found.     Assessment:       1. Bacterial sinusitis    2. Chills          Plan:         Bacterial sinusitis  -     cephALEXin (KEFLEX) 500 MG capsule; Take 1 capsule (500 mg total) by mouth every 6 (six) hours. for 7 days  Dispense: 28 capsule; Refill: 0    Chills  -     SARS Coronavirus 2 Antigen, POCT Manual Read  -     POCT Influenza A/B MOLECULAR           Medical Decision Making:   Differential Diagnosis:   URI  Allergic Rhinitis  Covid  Influenza

## 2023-04-23 ENCOUNTER — HOSPITAL ENCOUNTER (EMERGENCY)
Facility: HOSPITAL | Age: 83
Discharge: HOME | End: 2023-04-23
Attending: EMERGENCY MEDICINE
Payer: MEDICARE

## 2023-04-23 VITALS
SYSTOLIC BLOOD PRESSURE: 138 MMHG | RESPIRATION RATE: 18 BRPM | DIASTOLIC BLOOD PRESSURE: 68 MMHG | HEART RATE: 89 BPM | BODY MASS INDEX: 23.92 KG/M2 | HEIGHT: 63 IN | WEIGHT: 135 LBS | TEMPERATURE: 98 F | OXYGEN SATURATION: 98 %

## 2023-04-23 DIAGNOSIS — S09.90XA INJURY OF HEAD, INITIAL ENCOUNTER: ICD-10-CM

## 2023-04-23 DIAGNOSIS — R07.9 CHEST PAIN: ICD-10-CM
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
## 2023-04-23 DIAGNOSIS — M25.552 LEFT HIP PAIN: ICD-10-CM

## 2023-04-23 PROCEDURE — 71046 X-RAY EXAM CHEST 2 VIEWS: CPT | Mod: 26,,, | Performed by: RADIOLOGY

## 2023-04-23 PROCEDURE — 73502 X-RAY EXAM HIP UNI 2-3 VIEWS: CPT | Mod: 26,LT,, | Performed by: RADIOLOGY

## 2023-04-23 PROCEDURE — 99284 EMERGENCY DEPT VISIT MOD MDM: CPT | Mod: 25

## 2023-04-23 PROCEDURE — 73502 XR HIP WITH PELVIS WHEN PERFORMED, 2 OR 3 VIEWS LEFT: ICD-10-PCS | Mod: 26,LT,, | Performed by: RADIOLOGY

## 2023-04-23 PROCEDURE — 73502 X-RAY EXAM HIP UNI 2-3 VIEWS: CPT | Mod: TC,LT

## 2023-04-23 PROCEDURE — 70450 CT HEAD/BRAIN W/O DYE: CPT | Mod: TC

## 2023-04-23 PROCEDURE — 70450 CT HEAD/BRAIN W/O DYE: CPT | Mod: 26,,, | Performed by: RADIOLOGY

## 2023-04-23 PROCEDURE — 71046 XR CHEST PA AND LATERAL: ICD-10-PCS | Mod: 26,,, | Performed by: RADIOLOGY

## 2023-04-23 PROCEDURE — 70450 CT HEAD WITHOUT CONTRAST: ICD-10-PCS | Mod: 26,,, | Performed by: RADIOLOGY

## 2023-04-23 PROCEDURE — 71046 X-RAY EXAM CHEST 2 VIEWS: CPT | Mod: TC

## 2023-04-23 NOTE — DISCHARGE INSTRUCTIONS
Take OTC acetaminophen as needed. Apply ice to contusion area. Return for any worsening or new symptoms. Follow up with Primary Care Provider in the next 2-3 days.
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
## 2023-05-08 ENCOUNTER — OFFICE VISIT (OUTPATIENT)
Dept: FAMILY MEDICINE | Facility: CLINIC | Age: 83
End: 2023-05-08
Payer: MEDICARE

## 2023-05-08 VITALS
BODY MASS INDEX: 23.63 KG/M2 | WEIGHT: 133.38 LBS | DIASTOLIC BLOOD PRESSURE: 76 MMHG | OXYGEN SATURATION: 95 % | HEART RATE: 77 BPM | RESPIRATION RATE: 15 BRPM | SYSTOLIC BLOOD PRESSURE: 134 MMHG | HEIGHT: 63 IN

## 2023-05-08 DIAGNOSIS — M54.6 ACUTE BILATERAL THORACIC BACK PAIN: ICD-10-CM

## 2023-05-08 DIAGNOSIS — R10.12 LEFT UPPER QUADRANT PAIN: Primary | ICD-10-CM

## 2023-05-08 PROCEDURE — 99214 OFFICE O/P EST MOD 30 MIN: CPT | Mod: PBBFAC,PN | Performed by: FAMILY MEDICINE

## 2023-05-08 PROCEDURE — 99999 PR PBB SHADOW E&M-EST. PATIENT-LVL IV: CPT | Mod: PBBFAC,,, | Performed by: FAMILY MEDICINE

## 2023-05-08 PROCEDURE — 99999 PR PBB SHADOW E&M-EST. PATIENT-LVL IV: ICD-10-PCS | Mod: PBBFAC,,, | Performed by: FAMILY MEDICINE

## 2023-05-08 PROCEDURE — 99213 OFFICE O/P EST LOW 20 MIN: CPT | Mod: S$PBB,,, | Performed by: FAMILY MEDICINE

## 2023-05-08 PROCEDURE — 99213 PR OFFICE/OUTPT VISIT, EST, LEVL III, 20-29 MIN: ICD-10-PCS | Mod: S$PBB,,, | Performed by: FAMILY MEDICINE

## 2023-05-08 RX ORDER — METHOCARBAMOL 500 MG/1
500 TABLET, FILM COATED ORAL 3 TIMES DAILY
Qty: 15 TABLET | Refills: 0 | Status: SHIPPED | OUTPATIENT
Start: 2023-05-08 | End: 2023-05-13

## 2023-05-08 RX ORDER — BEMPEDOIC ACID 180 MG/1
1 TABLET, FILM COATED ORAL
COMMUNITY
Start: 2023-04-12

## 2023-05-18 ENCOUNTER — HOSPITAL ENCOUNTER (OUTPATIENT)
Dept: RADIOLOGY | Facility: HOSPITAL | Age: 83
Discharge: HOME | End: 2023-05-18
Attending: FAMILY MEDICINE
Payer: MEDICARE

## 2023-05-18 DIAGNOSIS — R10.12 LEFT UPPER QUADRANT PAIN: ICD-10-CM

## 2023-05-18 PROCEDURE — 76700 US ABDOMEN COMPLETE: ICD-10-PCS | Mod: 26,,, | Performed by: RADIOLOGY

## 2023-05-18 PROCEDURE — 76700 US EXAM ABDOM COMPLETE: CPT | Mod: TC

## 2023-05-18 PROCEDURE — 76700 US EXAM ABDOM COMPLETE: CPT | Mod: 26,,, | Performed by: RADIOLOGY

## 2023-06-01 ENCOUNTER — PATIENT MESSAGE (OUTPATIENT)
Dept: FAMILY MEDICINE | Facility: CLINIC | Age: 83
End: 2023-06-01
Payer: MEDICARE

## 2023-06-05 ENCOUNTER — PATIENT MESSAGE (OUTPATIENT)
Dept: FAMILY MEDICINE | Facility: CLINIC | Age: 83
End: 2023-06-05
Payer: MEDICARE

## 2023-07-24 ENCOUNTER — LAB VISIT (OUTPATIENT)
Dept: LAB | Facility: HOSPITAL | Age: 83
End: 2023-07-24
Attending: NURSE PRACTITIONER
Payer: MEDICARE

## 2023-07-24 DIAGNOSIS — E55.9 AVITAMINOSIS D: Primary | ICD-10-CM

## 2023-07-24 DIAGNOSIS — Z51.81 ENCOUNTER FOR THERAPEUTIC DRUG MONITORING: ICD-10-CM

## 2023-07-24 LAB
ALBUMIN SERPL BCP-MCNC: 4 G/DL (ref 3.5–5.2)
ALP SERPL-CCNC: 67 U/L (ref 55–135)
ALT SERPL W/O P-5'-P-CCNC: 16 U/L (ref 10–44)
ANION GAP SERPL CALC-SCNC: 16 MMOL/L (ref 8–16)
AST SERPL-CCNC: 14 U/L (ref 10–40)
BILIRUB SERPL-MCNC: 0.6 MG/DL (ref 0.1–1)
BUN SERPL-MCNC: 23 MG/DL (ref 8–23)
CALCIUM SERPL-MCNC: 10.1 MG/DL (ref 8.7–10.5)
CHLORIDE SERPL-SCNC: 103 MMOL/L (ref 95–110)
CHOLEST SERPL-MCNC: 233 MG/DL (ref 120–199)
CHOLEST/HDLC SERPL: 3.3 {RATIO} (ref 2–5)
CO2 SERPL-SCNC: 24 MMOL/L (ref 23–29)
CREAT SERPL-MCNC: 0.9 MG/DL (ref 0.5–1.4)
ERYTHROCYTE [DISTWIDTH] IN BLOOD BY AUTOMATED COUNT: 11.9 % (ref 11.5–14.5)
EST. GFR  (NO RACE VARIABLE): >60 ML/MIN/1.73 M^2
GLUCOSE SERPL-MCNC: 104 MG/DL (ref 70–110)
HCT VFR BLD AUTO: 41.4 % (ref 37–48.5)
HDLC SERPL-MCNC: 71 MG/DL (ref 40–75)
HDLC SERPL: 30.5 % (ref 20–50)
HGB BLD-MCNC: 13.8 G/DL (ref 12–16)
LDLC SERPL CALC-MCNC: 136.8 MG/DL (ref 63–159)
MAGNESIUM SERPL-MCNC: 1.7 MG/DL (ref 1.6–2.6)
MCH RBC QN AUTO: 30.2 PG (ref 27–31)
MCHC RBC AUTO-ENTMCNC: 33.3 G/DL (ref 32–36)
MCV RBC AUTO: 91 FL (ref 82–98)
NONHDLC SERPL-MCNC: 162 MG/DL
PLATELET # BLD AUTO: 211 K/UL (ref 150–450)
PMV BLD AUTO: 10.1 FL (ref 9.2–12.9)
POTASSIUM SERPL-SCNC: 3.5 MMOL/L (ref 3.5–5.1)
PROT SERPL-MCNC: 6.9 G/DL (ref 6–8.4)
RBC # BLD AUTO: 4.57 M/UL (ref 4–5.4)
SODIUM SERPL-SCNC: 143 MMOL/L (ref 136–145)
TRIGL SERPL-MCNC: 126 MG/DL (ref 30–150)
TSH SERPL DL<=0.005 MIU/L-ACNC: 3.37 UIU/ML (ref 0.4–4)
WBC # BLD AUTO: 5.54 K/UL (ref 3.9–12.7)

## 2023-07-24 PROCEDURE — 82306 VITAMIN D 25 HYDROXY: CPT | Performed by: NURSE PRACTITIONER

## 2023-07-24 PROCEDURE — 84443 ASSAY THYROID STIM HORMONE: CPT | Performed by: NURSE PRACTITIONER

## 2023-07-24 PROCEDURE — 80053 COMPREHEN METABOLIC PANEL: CPT | Performed by: NURSE PRACTITIONER

## 2023-07-24 PROCEDURE — 80061 LIPID PANEL: CPT | Performed by: NURSE PRACTITIONER

## 2023-07-24 PROCEDURE — 85027 COMPLETE CBC AUTOMATED: CPT | Performed by: NURSE PRACTITIONER

## 2023-07-24 PROCEDURE — 36415 COLL VENOUS BLD VENIPUNCTURE: CPT | Performed by: NURSE PRACTITIONER

## 2023-07-24 PROCEDURE — 83735 ASSAY OF MAGNESIUM: CPT | Performed by: NURSE PRACTITIONER

## 2023-07-25 LAB — 25(OH)D3+25(OH)D2 SERPL-MCNC: 63 NG/ML (ref 30–96)

## 2023-09-06 ENCOUNTER — TELEPHONE (OUTPATIENT)
Dept: FAMILY MEDICINE | Facility: CLINIC | Age: 83
End: 2023-09-06
Payer: MEDICARE

## 2023-09-06 NOTE — TELEPHONE ENCOUNTER
Not a problem at all. Labs are mostly unremarkable and require no intervention, but a follow up is a good idea. -Dr. Matias

## 2023-09-06 NOTE — TELEPHONE ENCOUNTER
----- Message from Naya Sanchez sent at 9/6/2023 10:43 AM CDT -----  Contact: PT  Type:  Needs Medical Advice    Who Called: PT   Symptoms (please be specific): WILL JULY 2023 LAB RESULTS STILL BE VALID FOR REVIEW FOR HER OCT 2023 APPT. IF NOT PLEASE CALL PT TO DISCUSS COMING IN TO REDRAW LAB WORK.      Would the patient rather a call back or a response via [facility]? CALL   Best Call Back Number: [phone] (home)     Additional Information: THANK YOU

## 2023-10-23 ENCOUNTER — OFFICE VISIT (OUTPATIENT)
Dept: FAMILY MEDICINE | Facility: CLINIC | Age: 83
End: 2023-10-23
Payer: MEDICARE

## 2023-10-23 VITALS
SYSTOLIC BLOOD PRESSURE: 138 MMHG | RESPIRATION RATE: 16 BRPM | HEIGHT: 63 IN | BODY MASS INDEX: 24.98 KG/M2 | WEIGHT: 141 LBS | DIASTOLIC BLOOD PRESSURE: 76 MMHG

## 2023-10-23 DIAGNOSIS — G47.00 INSOMNIA, UNSPECIFIED TYPE: Primary | ICD-10-CM

## 2023-10-23 DIAGNOSIS — I70.0 AORTIC ATHEROSCLEROSIS: ICD-10-CM

## 2023-10-23 DIAGNOSIS — I48.0 PAROXYSMAL ATRIAL FIBRILLATION: ICD-10-CM

## 2023-10-23 PROCEDURE — 99213 PR OFFICE/OUTPT VISIT, EST, LEVL III, 20-29 MIN: ICD-10-PCS | Mod: S$PBB,,, | Performed by: FAMILY MEDICINE

## 2023-10-23 PROCEDURE — 99999PBSHW FLU VACCINE - QUADRIVALENT - ADJUVANTED: ICD-10-PCS | Mod: PBBFAC,,,

## 2023-10-23 PROCEDURE — 99999PBSHW FLU VACCINE - QUADRIVALENT - ADJUVANTED: Mod: PBBFAC,,,

## 2023-10-23 PROCEDURE — 99999 PR PBB SHADOW E&M-EST. PATIENT-LVL II: CPT | Mod: PBBFAC,,, | Performed by: FAMILY MEDICINE

## 2023-10-23 PROCEDURE — 99212 OFFICE O/P EST SF 10 MIN: CPT | Mod: PBBFAC,PN,25 | Performed by: FAMILY MEDICINE

## 2023-10-23 PROCEDURE — 99999 PR PBB SHADOW E&M-EST. PATIENT-LVL II: ICD-10-PCS | Mod: PBBFAC,,, | Performed by: FAMILY MEDICINE

## 2023-10-23 PROCEDURE — G0008 ADMIN INFLUENZA VIRUS VAC: HCPCS | Mod: PBBFAC,PN

## 2023-10-23 PROCEDURE — 99213 OFFICE O/P EST LOW 20 MIN: CPT | Mod: S$PBB,,, | Performed by: FAMILY MEDICINE

## 2023-10-23 RX ORDER — ZOLPIDEM TARTRATE 5 MG/1
5 TABLET ORAL NIGHTLY PRN
Qty: 30 TABLET | Refills: 0 | Status: SHIPPED | OUTPATIENT
Start: 2023-10-23

## 2023-10-29 PROBLEM — I48.0 PAROXYSMAL ATRIAL FIBRILLATION: Status: ACTIVE | Noted: 2023-10-29

## 2023-10-29 PROBLEM — G47.00 INSOMNIA: Status: ACTIVE | Noted: 2023-10-29

## 2023-10-29 PROBLEM — I70.0 AORTIC ATHEROSCLEROSIS: Status: ACTIVE | Noted: 2023-10-29

## 2023-10-30 NOTE — ASSESSMENT & PLAN NOTE
"Lab Results   Component Value Date    CHOL 233 (H) 07/24/2023    CHOL 285 (H) 11/15/2022    CHOL 298 (H) 03/07/2022     Lab Results   Component Value Date    HDL 71 07/24/2023    HDL 65 11/15/2022    HDL 75 03/07/2022     Lab Results   Component Value Date    LDLCALC 136.8 07/24/2023    LDLCALC 188.6 (H) 11/15/2022    LDLCALC 193.6 (H) 03/07/2022     No results found for: "DLDL"  Lab Results   Component Value Date    TRIG 126 07/24/2023    TRIG 157 (H) 11/15/2022    TRIG 147 03/07/2022       f1   Lab Results   Component Value Date    CHOLHDL 30.5 07/24/2023    CHOLHDL 22.8 11/15/2022    CHOLHDL 25.2 03/07/2022     Hyperlipidemia Medications               NEXLETOL 180 mg Tab Take 1 tablet by mouth.         Continue medications as above.   Continue to follow with cardiology    "

## 2023-11-20 RX ORDER — HYDROCHLOROTHIAZIDE 25 MG/1
25 TABLET ORAL DAILY
Qty: 90 TABLET | Refills: 2 | Status: SHIPPED | OUTPATIENT
Start: 2023-11-20

## 2023-11-20 NOTE — TELEPHONE ENCOUNTER
Refill Routing Note   Medication(s) are not appropriate for processing by [facility] for the following reason(s):        No active prescription written by provider    [facility] action(s):  Defer               Appointments  past 12m or future 3m with PCP    Date Provider   Last Visit   10/23/2023 Keara Matias MD   Next Visit   Visit date not found Keara Matias MD   ED visits in past 90 days: 0        Note composed:4:51 PM 11/20/2023

## 2023-11-20 NOTE — TELEPHONE ENCOUNTER
No care due was identified.  [facility] Embedded Care Due Messages. Reference number: [id number].   11/20/2023 4:50:51 PM CST

## 2023-11-20 NOTE — TELEPHONE ENCOUNTER
----- Message from Kaitlyn Dickerson sent at 11/20/2023  1:27 PM CST -----  Contact: patient  Type:  Needs Medical Advice    Who Called: patient     Would the patient rather a call back or a response via [facility]? Call     Best Call Back Number: [phone] (home)      Additional Information: Patient would like to speak with the nurse in regards to her medication.     Please call to advise

## 2024-01-09 ENCOUNTER — OFFICE VISIT (OUTPATIENT)
Dept: OTOLARYNGOLOGY | Facility: CLINIC | Age: 84
End: 2024-01-09
Payer: MEDICARE

## 2024-01-09 ENCOUNTER — PATIENT MESSAGE (OUTPATIENT)
Dept: OTOLARYNGOLOGY | Facility: CLINIC | Age: 84
End: 2024-01-09

## 2024-01-09 VITALS — WEIGHT: 139.5 LBS | HEIGHT: 63 IN | BODY MASS INDEX: 24.72 KG/M2

## 2024-01-09 DIAGNOSIS — J06.9 UPPER RESPIRATORY TRACT INFECTION, UNSPECIFIED TYPE: Primary | ICD-10-CM

## 2024-01-09 PROCEDURE — 99203 OFFICE O/P NEW LOW 30 MIN: CPT | Mod: S$PBB,,, | Performed by: OTOLARYNGOLOGY

## 2024-01-09 PROCEDURE — 99999 PR PBB SHADOW E&M-EST. PATIENT-LVL III: CPT | Mod: PBBFAC,,, | Performed by: OTOLARYNGOLOGY

## 2024-01-09 PROCEDURE — 99213 OFFICE O/P EST LOW 20 MIN: CPT | Mod: PBBFAC,PN | Performed by: OTOLARYNGOLOGY

## 2024-01-09 RX ORDER — CEFUROXIME AXETIL 500 MG/1
500 TABLET ORAL 2 TIMES DAILY
Qty: 14 TABLET | Refills: 0 | Status: SHIPPED | OUTPATIENT
Start: 2024-01-09 | End: 2024-01-16

## 2024-01-09 RX ORDER — DEXAMETHASONE 2 MG/1
TABLET ORAL
Qty: 5 TABLET | Refills: 2 | Status: SHIPPED | OUTPATIENT
Start: 2024-01-09

## 2024-01-09 NOTE — PROGRESS NOTES
Subjective:       Patient ID: Liliana Hermosillo is a 83 y.o. female.    Chief Complaint: Sore Throat (Pt c/o throat pain and sinus headaches for a couple of days)      This 83-year-old who knows my family tells me that beginning Friday she started to feel bad nasal congestion and pain in her throat she points to her cricoid says that is the worst part as she just feels like there is a sharp pain in her throat and she sounds slightly hoarse as we discuss this.  Additionally she has a stuffy feeling in her ears and worsening of any hearing loss that she may have as a baseline.          Objective:      ENT Physical Exam            Assessment:       1. Upper respiratory tract infection, unspecified type         Plan:          So she has an acute upper respiratory tract infection her physical exam is not that remarkable I think a short round of steroids and antibiotics are appropriate treatment at this point she may not really need the antibiotics but given her concerns I included that

## 2024-01-20 ENCOUNTER — OFFICE VISIT (OUTPATIENT)
Dept: URGENT CARE | Facility: CLINIC | Age: 84
End: 2024-01-20
Payer: MEDICARE

## 2024-01-20 VITALS
TEMPERATURE: 98 F | BODY MASS INDEX: 23.92 KG/M2 | HEIGHT: 63 IN | DIASTOLIC BLOOD PRESSURE: 72 MMHG | RESPIRATION RATE: 18 BRPM | HEART RATE: 76 BPM | SYSTOLIC BLOOD PRESSURE: 128 MMHG | OXYGEN SATURATION: 98 % | WEIGHT: 135 LBS

## 2024-01-20 DIAGNOSIS — S81.801A WOUND OF RIGHT LOWER EXTREMITY, INITIAL ENCOUNTER: Primary | ICD-10-CM

## 2024-01-20 PROCEDURE — 99214 OFFICE O/P EST MOD 30 MIN: CPT | Mod: S$GLB,,, | Performed by: NURSE PRACTITIONER

## 2024-01-20 RX ORDER — DOXYCYCLINE 100 MG/1
100 CAPSULE ORAL EVERY 12 HOURS
Qty: 14 CAPSULE | Refills: 0 | Status: SHIPPED | OUTPATIENT
Start: 2024-01-20 | End: 2024-01-27

## 2024-01-20 NOTE — PROGRESS NOTES
"Subjective:      Patient ID: Liliana Hermosillo is a 83 y.o. female.    Vitals:  height is 5' 3" (1.6 m) and weight is 61.2 kg (135 lb). Her oral temperature is 97.9 °F (36.6 °C). Her blood pressure is 128/72 and her pulse is 76. Her respiration is 18 and oxygen saturation is 98%.     Chief Complaint: Wound on  right ankle     This is a 83 y.o. female who presents today with a chief complaint of sore on right leg possible infection. She explains that she struck her right lowe leg on metal bed frame 2 weeks ago.    Injury  The current episode started 1 to 4 weeks ago. The problem has been gradually worsening.       Skin:  Positive for wound.      Objective:     Physical Exam   Constitutional: She is oriented to person, place, and time. normal  HENT:   Head: Normocephalic and atraumatic.   Eyes: Conjunctivae are normal. Extraocular movement intact   Neck: Neck supple.   Cardiovascular: Normal rate, regular rhythm, normal heart sounds and normal pulses.   Pulmonary/Chest: Effort normal and breath sounds normal.   Abdominal: Normal appearance.   Musculoskeletal: Normal range of motion.         General: Normal range of motion.   Neurological: She is alert and oriented to person, place, and time.   Skin: Skin is warm and dry.         Comments: Very small (less than 0.5cm in diameter) well healing wound to right lower leg. There is only some very mild localized erythema to the area.    Psychiatric: Her behavior is normal.   Vitals reviewed.      Assessment:     1. Wound of right lower extremity, initial encounter        Plan:       Wound of right lower extremity, initial encounter  -     doxycycline (VIBRAMYCIN) 100 MG Cap; Take 1 capsule (100 mg total) by mouth every 12 (twelve) hours. for 7 days  Dispense: 14 capsule; Refill: 0    INSTRUCTIONS  Meds as prescribed. Wound care as directed. Follow up as advised.  "

## 2024-01-27 ENCOUNTER — CLINICAL SUPPORT (OUTPATIENT)
Dept: URGENT CARE | Facility: CLINIC | Age: 84
End: 2024-01-27
Payer: MEDICARE

## 2024-01-27 VITALS
BODY MASS INDEX: 23.92 KG/M2 | HEIGHT: 63 IN | WEIGHT: 135 LBS | TEMPERATURE: 98 F | OXYGEN SATURATION: 97 % | SYSTOLIC BLOOD PRESSURE: 118 MMHG | HEART RATE: 77 BPM | RESPIRATION RATE: 18 BRPM | DIASTOLIC BLOOD PRESSURE: 72 MMHG

## 2024-01-27 DIAGNOSIS — L03.115 CELLULITIS OF RIGHT ANKLE: Primary | ICD-10-CM

## 2024-01-27 PROCEDURE — 99213 OFFICE O/P EST LOW 20 MIN: CPT | Mod: S$GLB,,, | Performed by: NURSE PRACTITIONER

## 2024-01-27 RX ORDER — CEPHALEXIN 500 MG/1
500 CAPSULE ORAL 3 TIMES DAILY
Qty: 21 CAPSULE | Refills: 0 | Status: SHIPPED | OUTPATIENT
Start: 2024-01-27 | End: 2024-02-03

## 2024-01-27 NOTE — PATIENT INSTRUCTIONS
Report to ER for any acute worsening.     Keep area clean and dry.     Follow up with your PCP to monitor progression of treatment and ensure resolution.

## 2024-01-27 NOTE — PROGRESS NOTES
"Subjective:       Patient ID: Liliana Hermosillo is a 83 y.o. female.    Vitals:  height is 5' 3" (1.6 m) and weight is 61.2 kg (135 lb). Her oral temperature is 98.1 °F (36.7 °C). Her blood pressure is 118/72 and her pulse is 77. Her respiration is 18 and oxygen saturation is 97%.     Chief Complaint: Wound Check (Patient was seen 01/20 for a right ankle skin sore. Patient states no improvement. )    This is a 83 y.o. female who presents today with a chief complaint of wound to right ankle.       Patient presents with:  Wound Check: Patient was seen one week ago and treated for cellulitis to right ankle with doxycycline. Pt reports only mild improvement but is still experiencing swelling and redness to the area.         Wound Check  She was originally treated 5 to 10 days ago. The redness has not changed. The swelling has not changed. There is no pain present.       Constitution: Negative.   Skin:  Positive for erythema.           Objective:      Physical Exam   Constitutional: She is oriented to person, place, and time. She appears well-developed. She is cooperative.   HENT:   Head: Normocephalic and atraumatic.   Ears:   Right Ear: Hearing, tympanic membrane, external ear and ear canal normal.   Left Ear: Hearing, tympanic membrane, external ear and ear canal normal.   Nose: Nose normal. No mucosal edema or nasal deformity. No epistaxis. Right sinus exhibits no maxillary sinus tenderness and no frontal sinus tenderness. Left sinus exhibits no maxillary sinus tenderness and no frontal sinus tenderness.   Mouth/Throat: Uvula is midline, oropharynx is clear and moist and mucous membranes are normal. No trismus in the jaw. Normal dentition. No uvula swelling.   Eyes: Conjunctivae and lids are normal.   Neck: Trachea normal and phonation normal. Neck supple.   Cardiovascular: Normal rate, regular rhythm, normal heart sounds and normal pulses.   Pulmonary/Chest: Effort normal and breath sounds normal.   Abdominal: Normal " appearance and bowel sounds are normal. Soft.   Musculoskeletal: Normal range of motion.         General: Normal range of motion.      Comments: Round raised area of black scabbing noted to right ankle, 1cm, with area of erythema noted surrounding extending 2cm, with mild warmth. No discharge noted. Not linear erythema.    Neurological: She is alert and oriented to person, place, and time. She exhibits normal muscle tone.   Skin: Skin is warm, dry and intact. erythema   Psychiatric: Her speech is normal and behavior is normal. Judgment and thought content normal.   Nursing note and vitals reviewed.        Past medical history and current medications reviewed.       Assessment:           1. Cellulitis of right ankle              Plan:         Cellulitis of right ankle  -     cephALEXin (KEFLEX) 500 MG capsule; Take 1 capsule (500 mg total) by mouth 3 (three) times daily. for 7 days  Dispense: 21 capsule; Refill: 0             Patient Instructions   Report to ER for any acute worsening.     Keep area clean and dry.     Follow up with your PCP to monitor progression of treatment and ensure resolution.            Medical Decision Making:   Urgent Care Management:  Pt cellulitis not resolved with doxycycline. Will change to keflex TID. I recommended a referral to wound care clinic but pt did not want this referral at this time. She stated she would follow up with her PCP.            APRIL Guidry

## 2024-02-14 ENCOUNTER — LAB VISIT (OUTPATIENT)
Dept: LAB | Facility: HOSPITAL | Age: 84
End: 2024-02-14
Attending: FAMILY MEDICINE
Payer: MEDICARE

## 2024-02-14 DIAGNOSIS — E78.5 HYPERLIPIDEMIA, UNSPECIFIED HYPERLIPIDEMIA TYPE: Primary | ICD-10-CM

## 2024-02-14 DIAGNOSIS — E78.5 HYPERLIPIDEMIA, UNSPECIFIED HYPERLIPIDEMIA TYPE: ICD-10-CM

## 2024-02-14 LAB
ALBUMIN SERPL BCP-MCNC: 4 G/DL (ref 3.5–5.2)
ALP SERPL-CCNC: 38 U/L (ref 55–135)
ALT SERPL W/O P-5'-P-CCNC: 19 U/L (ref 10–44)
AST SERPL-CCNC: 22 U/L (ref 10–40)
BILIRUB DIRECT SERPL-MCNC: 0.2 MG/DL (ref 0.1–0.3)
BILIRUB SERPL-MCNC: 0.7 MG/DL (ref 0.1–1)
CHOLEST SERPL-MCNC: 190 MG/DL (ref 120–199)
CHOLEST/HDLC SERPL: 3 {RATIO} (ref 2–5)
HDLC SERPL-MCNC: 64 MG/DL (ref 40–75)
HDLC SERPL: 33.7 % (ref 20–50)
LDLC SERPL CALC-MCNC: 105 MG/DL (ref 63–159)
NONHDLC SERPL-MCNC: 126 MG/DL
PROT SERPL-MCNC: 6.7 G/DL (ref 6–8.4)
TRIGL SERPL-MCNC: 105 MG/DL (ref 30–150)

## 2024-02-14 PROCEDURE — 80061 LIPID PANEL: CPT | Performed by: SPECIALIST

## 2024-02-14 PROCEDURE — 36415 COLL VENOUS BLD VENIPUNCTURE: CPT | Performed by: SPECIALIST

## 2024-02-14 PROCEDURE — 80076 HEPATIC FUNCTION PANEL: CPT | Performed by: SPECIALIST

## 2024-02-15 ENCOUNTER — PATIENT MESSAGE (OUTPATIENT)
Dept: FAMILY MEDICINE | Facility: CLINIC | Age: 84
End: 2024-02-15
Payer: MEDICARE

## 2024-02-16 ENCOUNTER — E-VISIT (OUTPATIENT)
Dept: FAMILY MEDICINE | Facility: CLINIC | Age: 84
End: 2024-02-16
Payer: MEDICARE

## 2024-02-16 DIAGNOSIS — B37.31 YEAST VAGINITIS: Primary | ICD-10-CM

## 2024-02-16 PROCEDURE — 99421 OL DIG E/M SVC 5-10 MIN: CPT | Mod: ,,, | Performed by: FAMILY MEDICINE

## 2024-02-16 RX ORDER — FLUCONAZOLE 150 MG/1
TABLET ORAL
Qty: 2 TABLET | Refills: 0 | Status: SHIPPED | OUTPATIENT
Start: 2024-02-16

## 2024-02-16 NOTE — PROGRESS NOTES
Patient ID: Liliana Hermosillo is a 83 y.o. female.    Chief Complaint: Vaginal Discharge (Entered automatically based on patient selection in Patient Portal.)    The patient initiated a request through [email or web address] on 2/16/2024 for evaluation and management with a chief complaint of Vaginal Discharge (Entered automatically based on patient selection in Patient Portal.)     I evaluated the questionnaire submission on 2/16/24.    [facility] Peq Evisit Vaginal Discharge    2/16/2024  2:49 PM CST - Filed by Patient   Do you agree to participate in an E-Visit? Yes   If you have any of the following symptoms,  please do not complete an E-Visit,  schedule an appointment with your provider:    What is the main issue that you would like for your doctor to address today? Yeast infection   Are you able to take your vital signs? Yes   Systolic Blood Pressure: 130   Diastolic Blood Pressure: 80   Weight: 137   Height: 63   Pulse: 78   Temperature: 97.7   Respiration rate:    Pulse Oxygen: 98   Which of the following are you experiencing? Vaginal Pain    Are you having pain while passing urine? No, I have no pain while urinating.   Which of the following applies to your vaginal discharge? I have no discharge.    Which of the following are you experiencing? None of the above   Do you have any sores on your genitals? No    Have you taken antibiotics recently? Yes, I recently took some   Please enter when you took antibiotics, and the name of the medicine, if you know it. Took 3 different antibiotics for 3 weeks. Finished Keflex? About a week ago.    Do you use any of the following? None of the above   Which of the following applies to your menstrual period? I do not have menstrual periods   Have you had similar symptoms in the past? Yes, I have had had similar symptoms more than once before.   When you had similar symptoms in the past, did any of the following work? I don't remember   Have you had a fever? No   During the last 2 months, have you  had sexual contact with a specific person for the first time? No   Provide any information you feel is important to your history not asked above    Please attach any relevant images or files          Encounter Diagnosis   Name Primary?    Yeast vaginitis Yes        No orders of the defined types were placed in this encounter.     Medications Ordered This Encounter   Medications    fluconazole (DIFLUCAN) 150 MG Tab     Sig: Take one tablet (150mg) by mouth once; May repeat in 72 hours if symptoms persist.     Dispense:  2 tablet     Refill:  0        No follow-ups on file.      E-Visit Time Tracking:    Day 1 Time (in minutes): 5    Total Time (in minutes): 5

## 2024-02-22 ENCOUNTER — PATIENT MESSAGE (OUTPATIENT)
Dept: FAMILY MEDICINE | Facility: CLINIC | Age: 84
End: 2024-02-22
Payer: MEDICARE

## 2024-03-11 ENCOUNTER — PATIENT MESSAGE (OUTPATIENT)
Dept: FAMILY MEDICINE | Facility: CLINIC | Age: 84
End: 2024-03-11
Payer: MEDICARE

## 2024-03-27 ENCOUNTER — TELEPHONE (OUTPATIENT)
Dept: FAMILY MEDICINE | Facility: CLINIC | Age: 84
End: 2024-03-27
Payer: MEDICARE

## 2024-03-27 ENCOUNTER — PATIENT MESSAGE (OUTPATIENT)
Dept: FAMILY MEDICINE | Facility: CLINIC | Age: 84
End: 2024-03-27
Payer: MEDICARE

## 2024-03-27 DIAGNOSIS — R53.1 WEAKNESS: ICD-10-CM

## 2024-03-27 DIAGNOSIS — F41.9 ANXIETY: ICD-10-CM

## 2024-03-27 DIAGNOSIS — R42 DIZZINESS: ICD-10-CM

## 2024-03-27 DIAGNOSIS — E16.2 HYPOGLYCEMIA: ICD-10-CM

## 2024-03-27 NOTE — TELEPHONE ENCOUNTER
----- Message from Cristina Stanley sent at 3/27/2024  4:40 PM CDT -----  Contact: self  Type:  RX Refill Request    Who Called:  Pt   Refill or New Rx: Refill   RX Name and Strength: hydrOXYzine pamoate (VISTARIL) 25 MG Cap  How is the patient currently taking it? (ex. 1XDay): as directed  Is this a 30 day or 90 day RX:   Preferred Pharmacy with phone number:   [facility] - [place], [place] - [address]  [address] [place] [address]  Phone: [phone] Fax: [phone]  Local or Mail Order: Local  Ordering Provider: Dr. Matias  Best Call Back Number for Patient: [phone]  Thank you...

## 2024-03-28 RX ORDER — HYDROXYZINE PAMOATE 25 MG/1
25 CAPSULE ORAL EVERY 8 HOURS PRN
Qty: 90 CAPSULE | Refills: 11 | Status: SHIPPED | OUTPATIENT
Start: 2024-03-28

## 2024-04-08 ENCOUNTER — TELEPHONE (OUTPATIENT)
Dept: FAMILY MEDICINE | Facility: CLINIC | Age: 84
End: 2024-04-08
Payer: MEDICARE

## 2024-04-08 NOTE — TELEPHONE ENCOUNTER
----- Message from Keiry Stephens sent at 4/5/2024  3:20 PM CDT -----  Regarding: audiology  Contact: [phone]  Type: Needs Medical Advice    Who Called:  Liliana Govea Call Back Number: [phone]    Additional Information: we could not get the audiology appt scheduled with the ENT visit . Please add audiology to appt if necessary. Thank you

## 2024-04-11 ENCOUNTER — OFFICE VISIT (OUTPATIENT)
Dept: OTOLARYNGOLOGY | Facility: CLINIC | Age: 84
End: 2024-04-11
Payer: MEDICARE

## 2024-04-11 VITALS — HEIGHT: 63 IN | BODY MASS INDEX: 24.61 KG/M2 | WEIGHT: 138.88 LBS

## 2024-04-11 DIAGNOSIS — H69.91 DYSFUNCTION OF RIGHT EUSTACHIAN TUBE: Primary | ICD-10-CM

## 2024-04-11 PROCEDURE — 99213 OFFICE O/P EST LOW 20 MIN: CPT | Mod: PBBFAC,PN | Performed by: OTOLARYNGOLOGY

## 2024-04-11 PROCEDURE — 99213 OFFICE O/P EST LOW 20 MIN: CPT | Mod: S$PBB,,, | Performed by: OTOLARYNGOLOGY

## 2024-04-11 PROCEDURE — 99999 PR PBB SHADOW E&M-EST. PATIENT-LVL III: CPT | Mod: PBBFAC,,, | Performed by: OTOLARYNGOLOGY

## 2024-04-11 RX ORDER — FLUTICASONE PROPIONATE 50 MCG
2 SPRAY, SUSPENSION (ML) NASAL NIGHTLY
Qty: 15.8 ML | Refills: 11 | Status: SHIPPED | OUTPATIENT
Start: 2024-04-11 | End: 2025-04-11

## 2024-04-11 NOTE — PROGRESS NOTES
Subjective:       Patient ID: Liliana Hermosillo is a 83 y.o. female.    Chief Complaint: Ear Problem (Pt c/o popping in left ear but previously in the right ear)      This patient reports that recently she noticed prominent hearing loss in the right ear such that when she put her finger in her left ear she really could not hear anything and she heard some popping but it has improved greatly and her hearing seems back to normal now although she has had a little bit of the popping on the left side but not the hearing loss she is worried about losing her hearing but in general things are back to normal and we are communicating fine at the moment.  She did not have any flare-up of allergies although she is bothered by allergies, she takes Vistaril every night initially prescribed for anxiety but not serving that purpose well though it does help her sleep and she just takes it before bed dose, and she mentioned she does not feel like she has had a cold recently her sinus infection          Objective:      ENT Physical Exam    She does not have any fluid behind her eardrums or wax buildup her nasal exam looks pretty good        Assessment:       1. Dysfunction of right eustachian tube         Plan:          So things have returned to normal but her story is consistent with Eustachian tube dysfunction perhaps the drying effects of Vistaril cause some fluid did develop but there was none today in her symptoms have resolved     I have recommended she try some Flonase in the event that her story is consistent with Eustachian tube dysfunction and since she lives right ear in [place] if this problem recurs I wanted to see her back when it is active

## 2024-05-13 ENCOUNTER — TELEPHONE (OUTPATIENT)
Dept: UROLOGY | Facility: CLINIC | Age: 84
End: 2024-05-13
Payer: MEDICARE

## 2024-05-13 NOTE — TELEPHONE ENCOUNTER
----- Message from Kaitlyn Dickerson sent at 5/13/2024  1:40 PM CDT -----  Contact: patient  Type:  Needs Medical Advice    Who Called: patient     Would the patient rather a call back or a response via [facility]? Call     Best Call Back Number: [phone]    Additional Information: patient would like to speak with the nurse in regards to her appointment.     Please call to advise

## 2024-05-13 NOTE — PROGRESS NOTES
Subjective:       Patient ID: Liliana Hermosillo is a 83 y.o. female Body mass index is 23.91 kg/m².    Chief Complaint: Nausea and Anemia    This patient is new to me.  Referring Provider: No ref. provider found for nausea.       Allergic to protonix. Last EGD with Dr Weems about 5-6 years. Last Colonoscopy at age 77 with Dr Weems.    Constipation - pt claims from [facility]      Review of Systems   Constitutional:  Positive for activity change, appetite change and fatigue. Negative for fever and unexpected weight change.   HENT:  Negative for sore throat and trouble swallowing.    Respiratory:  Negative for cough and shortness of breath.    Cardiovascular:  Negative for chest pain.   Gastrointestinal:  Positive for abdominal pain, constipation and nausea. Negative for abdominal distention, anal bleeding, blood in stool, diarrhea, rectal pain and vomiting.       No LMP recorded. Patient has had a hysterectomy.  Past Medical History:   Diagnosis Date    Atypical ductal hyperplasia, breast     Chronic constipation     Diverticulitis     Diverticulosis     Edema     Hypertension     Mitral valve prolapse     Ovarian cyst      Past Surgical History:   Procedure Laterality Date    ANKLE ARTHROSCOPY W/ OPEN REPAIR      APPENDECTOMY  1972    BREAST BIOPSY Left     CHOLECYSTECTOMY      COLONOSCOPY      EPIDURAL STEROID INJECTION N/A 7/6/2022    Procedure: Injection, Steroid, Epidural NAIDA C7-T1;  Surgeon: Kobe Rodríguez MD;  Location: [facility] OR;  Service: Pain Management;  Laterality: N/A;  Medicare    EPIDURAL STEROID INJECTION INTO CERVICAL SPINE N/A 3/30/2022    Procedure: Injection-steroid-epidural-cervical;  Surgeon: Kobe Rodríguez MD;  Location: [facility] OR;  Service: Pain Management;  Laterality: N/A;  c7-t1    EYE SURGERY  2012    HYSTERECTOMY      INJECTION OF NERVE Bilateral 9/7/2022    Procedure: INJECTION, NERVE;  Surgeon: Kobe Rodríguez MD;  Location: [facility] OR;  Service: Anesthesiology;  Laterality:  Bilateral;  C2,3 Mbb and TON block    INJECTION OF NERVE Bilateral 10/12/2022    Procedure: INJECTION, NERVE;  Surgeon: Kobe Rodríguez MD;  Location: [facility] OR;  Service: Anesthesiology;  Laterality: Bilateral;  C2, C3 and TON  MBB    KNEE SURGERY      RADIOFREQUENCY ABLATION Bilateral 2022    Procedure: Radiofrequency Ablation C2-C3 and TON;  Surgeon: Kobe Rodríguez MD;  Location: [facility] OR;  Service: Pain Management;  Laterality: Bilateral;    TONSILLECTOMY      UPPER GASTROINTESTINAL ENDOSCOPY       Family History   Problem Relation Name Age of Onset    Colon cancer Mother      Anuerysm Father      Breast cancer Sister Lucinda Rauscheot     Breast cancer Maternal Aunt Alyce     Collagen disease Neg Hx      Ovarian cancer Neg Hx       Social History     Tobacco Use    Smoking status: Former     Current packs/day: 0.00     Average packs/day: 2.0 packs/day for 25.9 years (51.8 ttl pk-yrs)     Types: Cigarettes     Start date: 1960     Quit date: 1985     Years since quittin.4    Smokeless tobacco: Never   Substance Use Topics    Alcohol use: Yes     Alcohol/week: 2.0 standard drinks of alcohol     Types: 2 Glasses of wine per week    Drug use: No     Wt Readings from Last 10 Encounters:   24 61.2 kg (135 lb)   24 63 kg (138 lb 14.4 oz)   24 61.2 kg (135 lb)   24 61.2 kg (135 lb)   24 63.3 kg (139 lb 8 oz)   10/23/23 64 kg (141 lb)   23 60.5 kg (133 lb 6.4 oz)   23 61.2 kg (135 lb)   23 64.9 kg (143 lb)   12/15/22 65.3 kg (143 lb 15.4 oz)     Lab Results   Component Value Date    WBC 5.54 2023    HGB 13.8 2023    HCT 41.4 2023    MCV 91 2023     2023     CMP  Sodium   Date Value Ref Range Status   2023 143 136 - 145 mmol/L Final     Potassium   Date Value Ref Range Status   2023 3.5 3.5 - 5.1 mmol/L Final     Chloride   Date Value Ref Range Status   2023 103 95 - 110 mmol/L Final     CO2   Date  "Value Ref Range Status   07/24/2023 24 23 - 29 mmol/L Final     Glucose   Date Value Ref Range Status   07/24/2023 104 70 - 110 mg/dL Final     BUN   Date Value Ref Range Status   07/24/2023 23 8 - 23 mg/dL Final     Creatinine   Date Value Ref Range Status   07/24/2023 0.9 0.5 - 1.4 mg/dL Final     Calcium   Date Value Ref Range Status   07/24/2023 10.1 8.7 - 10.5 mg/dL Final     Total Protein   Date Value Ref Range Status   02/14/2024 6.7 6.0 - 8.4 g/dL Final     Albumin   Date Value Ref Range Status   02/14/2024 4.0 3.5 - 5.2 g/dL Final     Total Bilirubin   Date Value Ref Range Status   02/14/2024 0.7 0.1 - 1.0 mg/dL Final     Comment:     For infants and newborns, interpretation of results should be based  on gestational age, weight and in agreement with clinical  observations.    Premature Infant recommended reference ranges:  Up to 24 hours.............<8.0 mg/dL  Up to 48 hours............<12.0 mg/dL  3-5 days..................<15.0 mg/dL  6-29 days.................<15.0 mg/dL       Alkaline Phosphatase   Date Value Ref Range Status   02/14/2024 38 (L) 55 - 135 U/L Final     AST   Date Value Ref Range Status   02/14/2024 22 10 - 40 U/L Final     ALT   Date Value Ref Range Status   02/14/2024 19 10 - 44 U/L Final     Anion Gap   Date Value Ref Range Status   07/24/2023 16 8 - 16 mmol/L Final     eGFR if    Date Value Ref Range Status   03/07/2022 >60.0 >60 mL/min/1.73 m^2 Final     eGFR if non    Date Value Ref Range Status   03/07/2022 >60.0 >60 mL/min/1.73 m^2 Final     Comment:     Calculation used to obtain the estimated glomerular filtration  rate (eGFR) is the CKD-EPI equation.        No results found for: "AMYLASE"  No results found for: "LIPASE"  No results found for: "LIPASERES"  Lab Results   Component Value Date    TSH 3.374 07/24/2023       Reviewed prior medical records including radiology report of  ultrasound abdomen 08/18/2022, ultrasound abdomen 05/18/2023 & " endoscopy history (see surgical history).    Objective:      Physical Exam  Vitals and nursing note reviewed.   Constitutional:       General: She is not in acute distress.     Appearance: She is not ill-appearing.   HENT:      Head: Normocephalic and atraumatic.      Mouth/Throat:      Mouth: Mucous membranes are moist.      Pharynx: Oropharynx is clear.   Eyes:      Conjunctiva/sclera: Conjunctivae normal.   Cardiovascular:      Rate and Rhythm: Normal rate and regular rhythm.      Pulses: Normal pulses.   Pulmonary:      Effort: Pulmonary effort is normal. No respiratory distress.   Abdominal:      General: Bowel sounds are normal. There is no distension.      Palpations: Abdomen is soft.      Tenderness: There is no abdominal tenderness.   Skin:     General: Skin is warm and dry.      Capillary Refill: Capillary refill takes 2 to 3 seconds.   Neurological:      Mental Status: She is alert and oriented to person, place, and time.         Assessment:       1. Gastroesophageal reflux disease without esophagitis    2. Long-term current use of proton pump inhibitor therapy    3. Nausea        Plan:       Gastroesophageal reflux disease without esophagitis  -discussed about the different types of medications used to treat reflux and how to use them, antacids can be used PRN for breakthrough heartburn symptoms by reducing stomach acid that is already produced, H2 blockers work by limiting the amount acid production, & PPI's work to block acid production and are taken daily, patient verbalized understanding.  -Educated patient on lifestyle modifications to help control/reduce reflux/abdominal pain including: avoid large meals, avoid eating within 2-3 hours of bedtime (avoid late night eating & lying down soon after eating), elevate head of bed if nocturnal symptoms are present, smoking cessation (if current smoker), & weight loss (if overweight).   -Educated to avoid known foods which trigger reflux symptoms & to  minimize/avoid high-fat foods, chocolate, caffeine, citrus, alcohol, & tomato products.  -Advised to avoid/limit use of NSAID's, since they can cause GI upset, bleeding, and/or ulcers. If needed, take with food.     Restart PPI (Omeprazole) and Start Carafate  - schedule EGD, discussed procedure with patient, including risks and benefits, patient verbalized understanding    X-ray today to rule out obstruction/large volume of stool    -     CBC Without Differential; Future; Expected date: 05/16/2024  -     Comprehensive Metabolic Panel; Future; Expected date: 05/16/2024  -     Magnesium; Future; Expected date: 05/16/2024  -     TSH; Future; Expected date: 05/16/2024  -     Vitamin B12; Future; Expected date: 05/16/2024  -     X-Ray Abdomen AP 1 View; Future; Expected date: 05/16/2024  -     H. PYLORI ANTIBODY, IGG; Future; Expected date: 05/16/2024  -     sucralfate (CARAFATE) 1 gram tablet; Take 1 tablet (1 g total) by mouth 4 (four) times daily before meals and nightly. for 10 days  Dispense: 40 tablet; Refill: 0  -     omeprazole (PRILOSEC) 40 MG capsule; Take 1 capsule (40 mg total) by mouth once daily.  Dispense: 90 capsule; Refill: 3    Long-term current use of proton pump inhibitor therapy  - discussed with patient about long term use of reflux medications (preference to use lowest effective dose or discontinuing if possible), the risk and benefits of using these medications long term, the risk of untreated GERD such as sutherland's esophagus, and recommend a diet high in calcium and/or taking OTC calcium and vitamin d supplements as directed (such as Citracal +D), pt verbalized understanding.  - recommend annual monitoring with blood work to include CMP, CBC, vitamin B12, and magnesium.   -     CBC Without Differential; Future; Expected date: 05/16/2024  -     Comprehensive Metabolic Panel; Future; Expected date: 05/16/2024  -     Magnesium; Future; Expected date: 05/16/2024  -     TSH; Future; Expected date:  05/16/2024  -     Vitamin B12; Future; Expected date: 05/16/2024  -     X-Ray Abdomen AP 1 View; Future; Expected date: 05/16/2024  -     H. PYLORI ANTIBODY, IGG; Future; Expected date: 05/16/2024  -     sucralfate (CARAFATE) 1 gram tablet; Take 1 tablet (1 g total) by mouth 4 (four) times daily before meals and nightly. for 10 days  Dispense: 40 tablet; Refill: 0  -     omeprazole (PRILOSEC) 40 MG capsule; Take 1 capsule (40 mg total) by mouth once daily.  Dispense: 90 capsule; Refill: 3    Nausea  Suspect from constipation.  Start Miralax and increase fiber in diet  Recommend daily exercise as tolerated, adequate water intake (six 8-oz glasses of water daily), and high fiber diet. OTC fiber supplements are recommended if diet does not reach daily fiber goal (20-30 grams daily), such as Metamucil, Citrucel, or FiberCon (take as directed, separate from other oral medications by >2 hours).  -Recommend taking an OTC stool softener such as Colace as directed to avoid hard stools and straining with bowel movements PRN  -Recommend trying OTC MiraLax once daily (17g PO) as directed  - If no improvement with above recommendations, try intermittently dosed Dulcolax OTC as directed (every 3-4  days) PRN to facilitate bowel movements  -If still no improvement with these measures, call/follow-up    -     CBC Without Differential; Future; Expected date: 05/16/2024  -     Comprehensive Metabolic Panel; Future; Expected date: 05/16/2024  -     Magnesium; Future; Expected date: 05/16/2024  -     TSH; Future; Expected date: 05/16/2024  -     Vitamin B12; Future; Expected date: 05/16/2024  -     X-Ray Abdomen AP 1 View; Future; Expected date: 05/16/2024  -     H. PYLORI ANTIBODY, IGG; Future; Expected date: 05/16/2024  -     sucralfate (CARAFATE) 1 gram tablet; Take 1 tablet (1 g total) by mouth 4 (four) times daily before meals and nightly. for 10 days  Dispense: 40 tablet; Refill: 0  -     omeprazole (PRILOSEC) 40 MG capsule; Take  1 capsule (40 mg total) by mouth once daily.  Dispense: 90 capsule; Refill: 3      No follow-ups on file.      If no improvement in symptoms or symptoms worsen, call/follow-up at clinic or go to ER.       KAYLEY Preston, BENJAMIN-C    Encounter includes face to face time and non-face to face time preparing to see the patient (eg, review of tests), obtaining and/or reviewing separately obtained history, documenting clinical information in the electronic or other health record, independently interpreting results (not separately reported) and communicating results to the patient/family/caregiver, or care coordination (not separately reported).     A dictation software program was used for this note. Please expect some simple typographical errors in this note.

## 2024-05-13 NOTE — TELEPHONE ENCOUNTER
"Pt scheduled incorrectly with urology. Pt needs appt with Gastro for " stomach issues". Referred pt to scheduling  to get appt. Pt vu. Appt with NP cancelled  "

## 2024-05-16 ENCOUNTER — OFFICE VISIT (OUTPATIENT)
Dept: GASTROENTEROLOGY | Facility: CLINIC | Age: 84
End: 2024-05-16
Payer: MEDICARE

## 2024-05-16 ENCOUNTER — LAB VISIT (OUTPATIENT)
Dept: LAB | Facility: HOSPITAL | Age: 84
End: 2024-05-16
Payer: MEDICARE

## 2024-05-16 ENCOUNTER — HOSPITAL ENCOUNTER (OUTPATIENT)
Dept: RADIOLOGY | Facility: HOSPITAL | Age: 84
Discharge: HOME | End: 2024-05-16
Payer: MEDICARE

## 2024-05-16 VITALS
BODY MASS INDEX: 23.92 KG/M2 | WEIGHT: 135 LBS | DIASTOLIC BLOOD PRESSURE: 80 MMHG | HEIGHT: 63 IN | SYSTOLIC BLOOD PRESSURE: 137 MMHG

## 2024-05-16 DIAGNOSIS — R11.0 NAUSEA: ICD-10-CM

## 2024-05-16 DIAGNOSIS — Z79.899 LONG-TERM CURRENT USE OF PROTON PUMP INHIBITOR THERAPY: ICD-10-CM

## 2024-05-16 DIAGNOSIS — K21.9 GASTROESOPHAGEAL REFLUX DISEASE WITHOUT ESOPHAGITIS: ICD-10-CM

## 2024-05-16 DIAGNOSIS — K21.9 GASTROESOPHAGEAL REFLUX DISEASE WITHOUT ESOPHAGITIS: Primary | ICD-10-CM

## 2024-05-16 LAB
ALBUMIN SERPL BCP-MCNC: 4.3 G/DL (ref 3.5–5.2)
ALP SERPL-CCNC: 35 U/L (ref 55–135)
ALT SERPL W/O P-5'-P-CCNC: 26 U/L (ref 10–44)
ANION GAP SERPL CALC-SCNC: 14 MMOL/L (ref 8–16)
AST SERPL-CCNC: 24 U/L (ref 10–40)
BILIRUB SERPL-MCNC: 0.7 MG/DL (ref 0.1–1)
BUN SERPL-MCNC: 34 MG/DL (ref 8–23)
CALCIUM SERPL-MCNC: 10.5 MG/DL (ref 8.7–10.5)
CHLORIDE SERPL-SCNC: 100 MMOL/L (ref 95–110)
CO2 SERPL-SCNC: 24 MMOL/L (ref 23–29)
CREAT SERPL-MCNC: 1.5 MG/DL (ref 0.5–1.4)
ERYTHROCYTE [DISTWIDTH] IN BLOOD BY AUTOMATED COUNT: 11.9 % (ref 11.5–14.5)
EST. GFR  (NO RACE VARIABLE): 35 ML/MIN/1.73 M^2
GLUCOSE SERPL-MCNC: 95 MG/DL (ref 70–110)
HCT VFR BLD AUTO: 40.8 % (ref 37–48.5)
HGB BLD-MCNC: 13.4 G/DL (ref 12–16)
MAGNESIUM SERPL-MCNC: 1.7 MG/DL (ref 1.6–2.6)
MCH RBC QN AUTO: 29.3 PG (ref 27–31)
MCHC RBC AUTO-ENTMCNC: 32.8 G/DL (ref 32–36)
MCV RBC AUTO: 89 FL (ref 82–98)
PLATELET # BLD AUTO: 213 K/UL (ref 150–450)
PMV BLD AUTO: 10.3 FL (ref 9.2–12.9)
POTASSIUM SERPL-SCNC: 3.7 MMOL/L (ref 3.5–5.1)
PROT SERPL-MCNC: 7.4 G/DL (ref 6–8.4)
RBC # BLD AUTO: 4.58 M/UL (ref 4–5.4)
SODIUM SERPL-SCNC: 138 MMOL/L (ref 136–145)
TSH SERPL DL<=0.005 MIU/L-ACNC: 1.91 UIU/ML (ref 0.4–4)
VIT B12 SERPL-MCNC: 535 PG/ML (ref 210–950)
WBC # BLD AUTO: 6.19 K/UL (ref 3.9–12.7)

## 2024-05-16 PROCEDURE — 99213 OFFICE O/P EST LOW 20 MIN: CPT | Mod: PBBFAC,PN

## 2024-05-16 PROCEDURE — 74018 RADEX ABDOMEN 1 VIEW: CPT | Mod: 26,,, | Performed by: RADIOLOGY

## 2024-05-16 PROCEDURE — 82607 VITAMIN B-12: CPT

## 2024-05-16 PROCEDURE — 84443 ASSAY THYROID STIM HORMONE: CPT

## 2024-05-16 PROCEDURE — 80053 COMPREHEN METABOLIC PANEL: CPT

## 2024-05-16 PROCEDURE — 86677 HELICOBACTER PYLORI ANTIBODY: CPT

## 2024-05-16 PROCEDURE — 99999 PR PBB SHADOW E&M-EST. PATIENT-LVL III: CPT | Mod: PBBFAC,,,

## 2024-05-16 PROCEDURE — 85027 COMPLETE CBC AUTOMATED: CPT

## 2024-05-16 PROCEDURE — 74018 RADEX ABDOMEN 1 VIEW: CPT | Mod: TC

## 2024-05-16 PROCEDURE — 36415 COLL VENOUS BLD VENIPUNCTURE: CPT

## 2024-05-16 PROCEDURE — 83735 ASSAY OF MAGNESIUM: CPT

## 2024-05-16 PROCEDURE — 99214 OFFICE O/P EST MOD 30 MIN: CPT | Mod: S$PBB,,,

## 2024-05-16 RX ORDER — SUCRALFATE 1 G/1
1 TABLET ORAL
Qty: 40 TABLET | Refills: 0 | Status: SHIPPED | OUTPATIENT
Start: 2024-05-16 | End: 2024-05-27

## 2024-05-16 RX ORDER — OMEPRAZOLE 40 MG/1
40 CAPSULE, DELAYED RELEASE ORAL DAILY
Qty: 90 CAPSULE | Refills: 3 | Status: SHIPPED | OUTPATIENT
Start: 2024-05-16 | End: 2025-05-16

## 2024-05-16 NOTE — PATIENT INSTRUCTIONS
Miralax 1 capful daily  Increase fiber - take fiber supplement (can take gummies or capsules)  Increase hydration  Take carfate 4x a day for 10 days  Take omeprazole 40 mg once a day before breakfast

## 2024-05-17 ENCOUNTER — PATIENT MESSAGE (OUTPATIENT)
Dept: FAMILY MEDICINE | Facility: CLINIC | Age: 84
End: 2024-05-17
Payer: MEDICARE

## 2024-05-20 ENCOUNTER — TELEPHONE (OUTPATIENT)
Dept: GASTROENTEROLOGY | Facility: CLINIC | Age: 84
End: 2024-05-20
Payer: MEDICARE

## 2024-05-20 DIAGNOSIS — A04.8 H. PYLORI INFECTION: Primary | ICD-10-CM

## 2024-05-20 LAB — H PYLORI IGG SERPL QL IA: POSITIVE

## 2024-05-20 RX ORDER — BISMUTH SUBSALICYLATE 262 MG/1
1 TABLET ORAL 4 TIMES DAILY
Qty: 56 TABLET | Refills: 0 | Status: SHIPPED | OUTPATIENT
Start: 2024-05-20 | End: 2024-06-03

## 2024-05-20 RX ORDER — METRONIDAZOLE 500 MG/1
500 TABLET ORAL EVERY 8 HOURS
Qty: 42 TABLET | Refills: 0 | Status: SHIPPED | OUTPATIENT
Start: 2024-05-20 | End: 2024-06-03

## 2024-05-20 RX ORDER — DOXYCYCLINE 100 MG/1
100 CAPSULE ORAL 2 TIMES DAILY
Qty: 28 CAPSULE | Refills: 0 | Status: SHIPPED | OUTPATIENT
Start: 2024-05-20 | End: 2024-06-03

## 2024-05-20 RX ORDER — ONDANSETRON 8 MG/1
8 TABLET, ORALLY DISINTEGRATING ORAL EVERY 6 HOURS PRN
Qty: 20 TABLET | Refills: 0 | Status: SHIPPED | OUTPATIENT
Start: 2024-05-20

## 2024-05-20 RX ORDER — OMEPRAZOLE 40 MG/1
40 CAPSULE, DELAYED RELEASE ORAL
Qty: 28 CAPSULE | Refills: 0 | Status: SHIPPED | OUTPATIENT
Start: 2024-05-20 | End: 2024-06-03

## 2024-05-21 ENCOUNTER — TELEPHONE (OUTPATIENT)
Dept: FAMILY MEDICINE | Facility: CLINIC | Age: 84
End: 2024-05-21
Payer: MEDICARE

## 2024-05-21 DIAGNOSIS — N17.9 AKI (ACUTE KIDNEY INJURY): Primary | ICD-10-CM

## 2024-05-21 NOTE — TELEPHONE ENCOUNTER
"Spoke with patient. 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 I am not sure if I need to see or a kidney doctor" please advise  "

## 2024-05-21 NOTE — TELEPHONE ENCOUNTER
----- Message from Kate Gan, Patient Care Assistant sent at 5/21/2024  1:58 PM CDT -----  Regarding: advice  Contact: pt  Type: Needs Medical Advice    Who Called:  pt     Best Call Back Number: [phone] (home)     Additional Information: pt states she would like a callback regarding her kidney function and a referral. Please call to advise. Thanks!

## 2024-05-21 NOTE — TELEPHONE ENCOUNTER
Any new medications or recent illnesses?  Has she been taking any new OTC medications?  Recommend hydrating and repeating lab and getting her an appt to see me and discuss as well. -Dr. Matias

## 2024-05-22 NOTE — TELEPHONE ENCOUNTER
Any new medications or recent illnesses?  Has she been taking any new OTC medications?  Recommend hydrating and repeating lab and getting her an appt to see me and discuss as well. -Dr. Matias       Positive for H pylori, started medication  Monday 5/20/24   Pt scheduled to see Dr. Matias next Monday at 1440

## 2024-05-23 ENCOUNTER — TELEPHONE (OUTPATIENT)
Dept: FAMILY MEDICINE | Facility: CLINIC | Age: 84
End: 2024-05-23
Payer: MEDICARE

## 2024-05-23 NOTE — TELEPHONE ENCOUNTER
----- Message from Luke Carver sent at 5/23/2024  9:56 AM CDT -----  Regarding: rt call  Type:  Patient Returning Call    Who Called:pt    Who Left Message for Patient:Aissatou Ricketts,    Does the patient know what this is regarding?:yes    Best Call Back Number:[phone]      Additional Information:

## 2024-05-24 ENCOUNTER — LAB VISIT (OUTPATIENT)
Dept: LAB | Facility: HOSPITAL | Age: 84
End: 2024-05-24
Attending: FAMILY MEDICINE
Payer: MEDICARE

## 2024-05-24 DIAGNOSIS — N17.9 AKI (ACUTE KIDNEY INJURY): ICD-10-CM

## 2024-05-24 LAB
ANION GAP SERPL CALC-SCNC: 10 MMOL/L (ref 8–16)
BUN SERPL-MCNC: 18 MG/DL (ref 8–23)
CALCIUM SERPL-MCNC: 9.6 MG/DL (ref 8.7–10.5)
CHLORIDE SERPL-SCNC: 102 MMOL/L (ref 95–110)
CO2 SERPL-SCNC: 30 MMOL/L (ref 23–29)
CREAT SERPL-MCNC: 1.1 MG/DL (ref 0.5–1.4)
EST. GFR  (NO RACE VARIABLE): 50.5 ML/MIN/1.73 M^2
GLUCOSE SERPL-MCNC: 115 MG/DL (ref 70–110)
POTASSIUM SERPL-SCNC: 3.3 MMOL/L (ref 3.5–5.1)
SODIUM SERPL-SCNC: 142 MMOL/L (ref 136–145)

## 2024-05-24 PROCEDURE — 36415 COLL VENOUS BLD VENIPUNCTURE: CPT | Performed by: FAMILY MEDICINE

## 2024-05-24 PROCEDURE — 80048 BASIC METABOLIC PNL TOTAL CA: CPT | Performed by: FAMILY MEDICINE

## 2024-05-27 ENCOUNTER — OFFICE VISIT (OUTPATIENT)
Dept: FAMILY MEDICINE | Facility: CLINIC | Age: 84
End: 2024-05-27
Payer: MEDICARE

## 2024-05-27 VITALS
RESPIRATION RATE: 18 BRPM | BODY MASS INDEX: 24.89 KG/M2 | HEIGHT: 63 IN | WEIGHT: 140.5 LBS | OXYGEN SATURATION: 98 % | HEART RATE: 73 BPM | SYSTOLIC BLOOD PRESSURE: 122 MMHG | DIASTOLIC BLOOD PRESSURE: 80 MMHG

## 2024-05-27 DIAGNOSIS — G47.00 INSOMNIA, UNSPECIFIED TYPE: ICD-10-CM

## 2024-05-27 DIAGNOSIS — G31.9 DEGENERATIVE DISEASE OF NERVOUS SYSTEM, UNSPECIFIED: Primary | ICD-10-CM

## 2024-05-27 DIAGNOSIS — I48.0 PAROXYSMAL ATRIAL FIBRILLATION: ICD-10-CM

## 2024-05-27 DIAGNOSIS — N17.9 AKI (ACUTE KIDNEY INJURY): ICD-10-CM

## 2024-05-27 DIAGNOSIS — I70.0 AORTIC ATHEROSCLEROSIS: ICD-10-CM

## 2024-05-27 PROCEDURE — 99214 OFFICE O/P EST MOD 30 MIN: CPT | Mod: S$PBB,,, | Performed by: FAMILY MEDICINE

## 2024-05-27 PROCEDURE — 99999 PR PBB SHADOW E&M-EST. PATIENT-LVL IV: CPT | Mod: PBBFAC,,, | Performed by: FAMILY MEDICINE

## 2024-05-27 PROCEDURE — 99214 OFFICE O/P EST MOD 30 MIN: CPT | Mod: PBBFAC,PN | Performed by: FAMILY MEDICINE

## 2024-05-27 RX ORDER — TRAZODONE HYDROCHLORIDE 50 MG/1
50-100 TABLET ORAL NIGHTLY PRN
Qty: 60 TABLET | Refills: 11 | Status: SHIPPED | OUTPATIENT
Start: 2024-05-27 | End: 2025-05-27

## 2024-05-27 NOTE — PROGRESS NOTES
Subjective:       Patient ID: Liliana Hermosillo is a 83 y.o. female.    Chief Complaint: Follow-up (Review labs stage 3 kidney failure, numbness in both legs, and discuss Vistaril due to insurance not covering medication. )    Ms. Hermosillo presents today to follow up.     She was seen today in the office.     Progressive leg numbness and weakness. She thinks that the vertigo which keeps her on the walker. She feels like she does not feel like her legs are her legs. She has had some issues with this. She red something that happens can be nerve damage on small nerve endings.     She has tried PT and she did palates religiously 45 minutes every morning. She tried to do it again and then she had so much gastro issues and now she s tired of feeling horrible.     She has concerns about renal function as well and is worried that she needs a neurologist    Follow-up  Pertinent negatives include no abdominal pain, fatigue, fever or rash.       .  Patient Active Problem List   Diagnosis    Closed right hip fracture    Hypertension    Dyslipidemia    Gastroesophageal reflux disease    Sciatica of right side    Family history of colon cancer    Family history of lung cancer    Vertebral compression fracture    Age-related osteoporosis without current pathological fracture    Constipation    Diverticulosis    Multiple thyroid nodules    Advice given about COVID-19 virus infection    Stiffness of cervical spine    Neck pain, musculoskeletal    Gait instability    Abdominal pain    Hiatal hernia    Fatty liver    Paroxysmal atrial fibrillation    Aortic atherosclerosis    Insomnia    Degenerative disease of nervous system, unspecified     Liliana has a current medication list which includes the following prescription(s): ashwagandha root extract, clobetasol, fluconazole, fluticasone propionate, hydrochlorothiazide, hydroxyzine pamoate, ibuprofen, multivitamin, nexletol, omeprazole, ondansetron, turmeric, vitamin e, zolpidem,  promethazine, and trazodone.    Review of Systems   Constitutional:  Negative for activity change, appetite change, fatigue and fever.   Respiratory:  Negative for shortness of breath.    Gastrointestinal:  Negative for abdominal pain.   Integumentary:  Negative for rash.         Health Maintenance Due   Topic Date Due    Pneumococcal Vaccines (Age 65+) (1 of 2 - PCV) Never done    Shingles Vaccine (1 of 2) Never done    RSV Vaccine (Age 60+ and Pregnant patients) (1 - 1-dose 60+ series) Never done    COVID-19 Vaccine (3 - 2023-24 season) 09/01/2023      Health Maintenance reviewed and discussed- No vaccines desired today  Objective:      Physical Exam  Vitals and nursing note reviewed.   Constitutional:       General: She is not in acute distress.     Appearance: She is not ill-appearing.   Cardiovascular:      Rate and Rhythm: Normal rate and regular rhythm.      Heart sounds: No murmur heard.  Pulmonary:      Effort: Pulmonary effort is normal.      Breath sounds: Normal breath sounds. No wheezing.   Skin:     General: Skin is warm and dry.      Findings: No rash.   Neurological:      Mental Status: She is alert.   Psychiatric:         Mood and Affect: Mood normal.         Behavior: Behavior normal.         Assessment:       1. Degenerative disease of nervous system, unspecified    2. Paroxysmal atrial fibrillation    3. Aortic atherosclerosis    4. CHARLINE (acute kidney injury)    5. Insomnia, unspecified type        Plan:       1. Degenerative disease of nervous system, unspecified  Assessment & Plan:  Chronic progressive lower extremity issues.       2. Paroxysmal atrial fibrillation  Assessment & Plan:  No issues today.       3. Aortic atherosclerosis  Assessment & Plan:  Continue nexletol with Dr. Ramos.       4. CHARLINE (acute kidney injury)  -     Basic Metabolic Panel; Future; Expected date: 06/27/2024    5. Insomnia, unspecified type  -     traZODone (DESYREL) 50 MG tablet; Take 1-2 tablets ( mg total)  by mouth nightly as needed for Insomnia.  Dispense: 60 tablet; Refill: 11

## 2024-06-05 ENCOUNTER — TELEPHONE (OUTPATIENT)
Dept: GASTROENTEROLOGY | Facility: CLINIC | Age: 84
End: 2024-06-05
Payer: MEDICARE

## 2024-06-05 RX ORDER — PROMETHAZINE HYDROCHLORIDE 12.5 MG/1
12.5 TABLET ORAL EVERY 6 HOURS PRN
Qty: 30 TABLET | Refills: 0 | Status: SHIPPED | OUTPATIENT
Start: 2024-06-05

## 2024-06-05 NOTE — TELEPHONE ENCOUNTER
"Ms. Ford called in and stated that she completed her h. Pylori treatment on Monday. She stated while taking the medications and now after she has been "sick as a dog". I asked what "sick as a dog" meant she stated she has been very nauseous. I advised her that I will send a message to BROOKE Gloria in regards to message. She stated that She has been taking her zofran that was prescribed to her, but she's not sure how long she can live on that. She also stated she tried taking her Carafate and it made her nausea worse. No further issues noted.   "

## 2024-06-05 NOTE — TELEPHONE ENCOUNTER
Call placed to Ms. Ford, advised per Carli Brasher, NP I will send phenergan she can use to alternate with the zofran until her stomach settles. She verbalized understanding. Advised her to NOT take these medications together she will need to alternate them. She verbalized understanding. No further issues noted.

## 2024-06-13 ENCOUNTER — PATIENT MESSAGE (OUTPATIENT)
Dept: OTOLARYNGOLOGY | Facility: CLINIC | Age: 84
End: 2024-06-13

## 2024-06-13 ENCOUNTER — OFFICE VISIT (OUTPATIENT)
Dept: OTOLARYNGOLOGY | Facility: CLINIC | Age: 84
End: 2024-06-13
Payer: MEDICARE

## 2024-06-13 ENCOUNTER — CLINICAL SUPPORT (OUTPATIENT)
Dept: AUDIOLOGY | Facility: CLINIC | Age: 84
End: 2024-06-13
Payer: MEDICARE

## 2024-06-13 VITALS — HEIGHT: 63 IN | WEIGHT: 136 LBS | BODY MASS INDEX: 24.1 KG/M2

## 2024-06-13 DIAGNOSIS — H90.A22 SENSORINEURAL HEARING LOSS (SNHL) OF LEFT EAR WITH RESTRICTED HEARING OF RIGHT EAR: Primary | ICD-10-CM

## 2024-06-13 DIAGNOSIS — H93.11 TINNITUS AURIUM, RIGHT: ICD-10-CM

## 2024-06-13 DIAGNOSIS — H10.13 ALLERGIC CONJUNCTIVITIS OF BOTH EYES: ICD-10-CM

## 2024-06-13 DIAGNOSIS — J30.0 VASOMOTOR RHINITIS: ICD-10-CM

## 2024-06-13 DIAGNOSIS — H91.93 BILATERAL HEARING LOSS, UNSPECIFIED HEARING LOSS TYPE: Primary | ICD-10-CM

## 2024-06-13 DIAGNOSIS — H90.A11 CONDUCTIVE HEARING LOSS OF RIGHT EAR WITH RESTRICTED HEARING OF LEFT EAR: ICD-10-CM

## 2024-06-13 DIAGNOSIS — H90.A31 MIXED CONDUCTIVE AND SENSORINEURAL HEARING LOSS OF RIGHT EAR WITH RESTRICTED HEARING OF LEFT EAR: ICD-10-CM

## 2024-06-13 PROCEDURE — 99999 PR PBB SHADOW E&M-EST. PATIENT-LVL III: CPT | Mod: PBBFAC,,, | Performed by: OTOLARYNGOLOGY

## 2024-06-13 PROCEDURE — 99213 OFFICE O/P EST LOW 20 MIN: CPT | Mod: S$PBB,,, | Performed by: OTOLARYNGOLOGY

## 2024-06-13 PROCEDURE — 92557 COMPREHENSIVE HEARING TEST: CPT | Mod: PBBFAC,PN | Performed by: AUDIOLOGIST

## 2024-06-13 PROCEDURE — 99213 OFFICE O/P EST LOW 20 MIN: CPT | Mod: PBBFAC,PN,25 | Performed by: OTOLARYNGOLOGY

## 2024-06-13 PROCEDURE — 92567 TYMPANOMETRY: CPT | Mod: PBBFAC,PN | Performed by: AUDIOLOGIST

## 2024-06-13 RX ORDER — AZELASTINE HYDROCHLORIDE 0.5 MG/ML
1 SOLUTION/ DROPS OPHTHALMIC 2 TIMES DAILY
Qty: 6 ML | Refills: 11 | Status: SHIPPED | OUTPATIENT
Start: 2024-06-13 | End: 2025-06-13

## 2024-06-13 RX ORDER — IPRATROPIUM BROMIDE 42 UG/1
2 SPRAY, METERED NASAL 4 TIMES DAILY
Qty: 45 ML | Refills: 10 | Status: SHIPPED | OUTPATIENT
Start: 2024-06-13 | End: 2025-06-13

## 2024-06-13 NOTE — PROGRESS NOTES
Subjective:       Patient ID: Liliana Hermosillo is a 83 y.o. female.    Chief Complaint: Hearing Loss (Pt c/o hearing loss and roaring sound in both ears. )      I saw this patient has a month ago and she had had some intermittent stuffiness in her right ear it did seem to clear up but it comes and goes she also mentions his that she is miserable because you has so much nasal drainage the more I talked to her about it the more it has just a chronic drainage problem that does not respond to Flonase.  She thought Flonase may have been drying her eyes out and so she stopped doing it but she has been back and forth on and off of Flonase.          Objective:      ENT Physical Exam    Her physical exam shows possibly an effusion on the right but it has not easy to tell she has a little tympanosclerosis that makes examining it imperfect but no significant retractions perforation or infection.  The left ear looks more normal             Assessment:       1. Bilateral hearing loss, unspecified hearing loss type    2. Vasomotor rhinitis    3. Allergic conjunctivitis of both eyes    4. 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    Additionally I sent in some azelastine eyedrops, she has a lot of itching and redness in her eyes and perhaps that is helpful     In addition we discussed her audiogram which is curious in that she has a symmetric sensorineural level with normal low frequencies and then a significant sloping often to the high frequencies but on top of that on the right she has a conductive loss in the low frequencies and a funny little notch at the tympanogram.    We discussed the potential explanations for that, she does not have a perforation or an effusion there maybe some restriction of the movement of her stapes maybe in the  direction of otosclerosis for whatever reason it comes and goes the conductive loss isn't enough to benefit from any type of middle ear exploration so I do not think that is much to do right now and thankfully it is to some extent intermittent.

## 2024-06-13 NOTE — PROGRESS NOTES
"Liliana Hermosillo was seen 06/13/2024 by Dr. Jaramillo and referred for an audiological evaluation. Pt was alone during today's visit. Pertinent complaints today include hearing loss and tinnitus, primarily in the right ear. The tinnitus changes in pitch and volume and was described by patient as going from a "hiss" to a "scream" intermittently in the right ear only. Otoscopy revealed no cerumen in either ear. The tympanic membrane was visualized AU prior to proceeding with the hearing testing.     Results reveal a mild-to-moderately severe mixed hearing loss for the right ear and  normal-sloping to-severe sensorineural hearing loss for the left ear.    Speech Reception Thresholds were  30 dBHL for the right ear and 20 dBHL for the left ear.    Word recognition scores were good for the right ear and excellent for the left ear.   Tympanograms were Type A for the right ear and Type A for the left ear.    Audiogram results were reviewed in detail with patient and all questions were answered. Results will be reviewed by the referring provider at the completion of this note. All complaints were addressed during this visit to the patient's satisfaction.    Recommendations:  Follow up with ENT  Annual audiogram  Hearing protection in noise  Consider hearing aid consultation      "

## 2024-06-20 ENCOUNTER — TELEPHONE (OUTPATIENT)
Dept: FAMILY MEDICINE | Facility: CLINIC | Age: 84
End: 2024-06-20
Payer: MEDICARE

## 2024-06-20 ENCOUNTER — APPOINTMENT (OUTPATIENT)
Dept: LAB | Facility: HOSPITAL | Age: 84
End: 2024-06-20
Attending: FAMILY MEDICINE
Payer: MEDICARE

## 2024-06-20 DIAGNOSIS — E11.9 TYPE 2 DIABETES MELLITUS WITHOUT COMPLICATION, WITH LONG-TERM CURRENT USE OF INSULIN: Primary | ICD-10-CM

## 2024-06-20 DIAGNOSIS — Z79.4 TYPE 2 DIABETES MELLITUS WITHOUT COMPLICATION, WITH LONG-TERM CURRENT USE OF INSULIN: Primary | ICD-10-CM

## 2024-07-02 ENCOUNTER — ANESTHESIA (OUTPATIENT)
Dept: ENDOSCOPY | Facility: HOSPITAL | Age: 84
End: 2024-07-02
Payer: MEDICARE

## 2024-07-02 ENCOUNTER — HOSPITAL ENCOUNTER (OUTPATIENT)
Facility: HOSPITAL | Age: 84
Discharge: HOME | End: 2024-07-02
Attending: INTERNAL MEDICINE | Admitting: INTERNAL MEDICINE
Payer: MEDICARE

## 2024-07-02 ENCOUNTER — ANESTHESIA EVENT (OUTPATIENT)
Dept: ENDOSCOPY | Facility: HOSPITAL | Age: 84
End: 2024-07-02
Payer: MEDICARE

## 2024-07-02 DIAGNOSIS — K21.9 GERD (GASTROESOPHAGEAL REFLUX DISEASE): ICD-10-CM

## 2024-07-02 DIAGNOSIS — K31.7 GASTRIC POLYP: Primary | ICD-10-CM

## 2024-07-02 DIAGNOSIS — K29.70 GASTRITIS, PRESENCE OF BLEEDING UNSPECIFIED, UNSPECIFIED CHRONICITY, UNSPECIFIED GASTRITIS TYPE: ICD-10-CM

## 2024-07-02 DIAGNOSIS — K44.9 HIATAL HERNIA: ICD-10-CM

## 2024-07-02 PROCEDURE — 43251 EGD REMOVE LESION SNARE: CPT | Mod: ,,, | Performed by: INTERNAL MEDICINE

## 2024-07-02 PROCEDURE — 27201089 HC SNARE, DISP (ANY): Performed by: INTERNAL MEDICINE

## 2024-07-02 PROCEDURE — 27201012 HC FORCEPS, HOT/COLD, DISP: Performed by: INTERNAL MEDICINE

## 2024-07-02 PROCEDURE — 25000003 PHARM REV CODE 250: Performed by: NURSE ANESTHETIST, CERTIFIED REGISTERED

## 2024-07-02 PROCEDURE — 43239 EGD BIOPSY SINGLE/MULTIPLE: CPT | Mod: 59,,, | Performed by: INTERNAL MEDICINE

## 2024-07-02 PROCEDURE — 88305 TISSUE EXAM BY PATHOLOGIST: CPT | Mod: TC,59 | Performed by: PATHOLOGY

## 2024-07-02 PROCEDURE — 43251 EGD REMOVE LESION SNARE: CPT | Performed by: INTERNAL MEDICINE

## 2024-07-02 PROCEDURE — 37000008 HC ANESTHESIA 1ST 15 MINUTES: Performed by: INTERNAL MEDICINE

## 2024-07-02 PROCEDURE — 43239 EGD BIOPSY SINGLE/MULTIPLE: CPT | Mod: 59 | Performed by: INTERNAL MEDICINE

## 2024-07-02 PROCEDURE — 63600175 PHARM REV CODE 636 W HCPCS: Performed by: NURSE ANESTHETIST, CERTIFIED REGISTERED

## 2024-07-02 PROCEDURE — 25000003 PHARM REV CODE 250: Performed by: INTERNAL MEDICINE

## 2024-07-02 RX ORDER — PROPOFOL 10 MG/ML
INJECTION, EMULSION INTRAVENOUS
Status: COMPLETED
Start: 2024-07-02 | End: 2024-07-02

## 2024-07-02 RX ORDER — LIDOCAINE HYDROCHLORIDE 20 MG/ML
INJECTION, SOLUTION EPIDURAL; INFILTRATION; INTRACAUDAL; PERINEURAL
Status: DISCONTINUED
Start: 2024-07-02 | End: 2024-07-02 | Stop reason: HOSPADM

## 2024-07-02 RX ORDER — PROPOFOL 10 MG/ML
VIAL (ML) INTRAVENOUS
Status: DISCONTINUED | OUTPATIENT
Start: 2024-07-02 | End: 2024-07-02

## 2024-07-02 RX ORDER — SODIUM CHLORIDE 9 MG/ML
INJECTION, SOLUTION INTRAVENOUS CONTINUOUS
Status: DISCONTINUED | OUTPATIENT
Start: 2024-07-02 | End: 2024-07-02 | Stop reason: HOSPADM

## 2024-07-02 RX ORDER — LIDOCAINE HYDROCHLORIDE 20 MG/ML
INJECTION INTRAVENOUS
Status: DISCONTINUED | OUTPATIENT
Start: 2024-07-02 | End: 2024-07-02

## 2024-07-02 RX ADMIN — PROPOFOL 20 MG: 10 INJECTION, EMULSION INTRAVENOUS at 08:07

## 2024-07-02 RX ADMIN — PROPOFOL 60 MG: 10 INJECTION, EMULSION INTRAVENOUS at 08:07

## 2024-07-02 RX ADMIN — SODIUM CHLORIDE: 0.9 INJECTION, SOLUTION INTRAVENOUS at 07:07

## 2024-07-02 RX ADMIN — LIDOCAINE HYDROCHLORIDE 50 MG: 20 INJECTION, SOLUTION INTRAVENOUS at 08:07

## 2024-07-02 NOTE — ANESTHESIA PREPROCEDURE EVALUATION
07/02/2024  Liliana Hermosillo is a 84 y.o., female.      Pre-op Assessment          Review of Systems  Cardiovascular:     Hypertension                                  Hypertension     Atrial Fibrillation     Hepatic/GI:    Hiatal Hernia, GERD Liver Disease,     Gerd    Hernia, Hiatal Hernia   Liver Disease        Neurological:    Neuromuscular Disease,                                 Neuromuscular Disease       Physical Exam  General: Well nourished        Anesthesia Plan  Type of Anesthesia, risks & benefits discussed:    Anesthesia Type: Gen Natural Airway  Intra-op Monitoring Plan: Standard ASA Monitors  Induction:  IV  Informed Consent: Informed consent signed with the Patient and all parties understand the risks and agree with anesthesia plan.  All questions answered.   ASA Score: 2  Day of Surgery Review of History & Physical: H&P Update referred to the surgeon/provider.    Ready For Surgery From Anesthesia Perspective.     .

## 2024-07-02 NOTE — H&P
CC: GERD, nausea    84 year old female with above. States that symptoms are stable, no alleviating/exacerbating factors. No bleeding or weight loss.     ROS:  No headache, no fever/chills, no chest pain/SOB, no nausea/vomiting/diarrhea/constipation/GI bleeding/abdominal pain, no dysuria/hematuria.    VSSAF   Exam:   Alert and oriented x 3; no apparent distress   PERRLA, sclera anicteric  CV: Regular rate/rhythm, normal PMI   Lungs: Clear bilaterally with no wheeze/rales   Abdomen: Soft, NT/ND, normal bowel sounds   Ext: No cyanosis, clubbing     Impression:   As above    Plan:   Proceed with endoscopy. Further recs to follow.

## 2024-07-02 NOTE — PROVATION PATIENT INSTRUCTIONS
Discharge Summary/Instructions after an Endoscopic Procedure  Patient Name: Liliana Hermosillo  Patient MRN: [id number]  Patient YOB: 1940 Tuesday, July 2, 2024  Indio Pascual MD  Dear patient,  As a result of recent federal legislation (The Federal Cures Act), you may   receive lab or pathology results from your procedure in your [facility]   account before your physician is able to contact you. Your physician or   their representative will relay the results to you with their   recommendations at their soonest availability.  Thank you,  RESTRICTIONS:  During your procedure today, you received medications for sedation.  These   medications may affect your judgment, balance and coordination.  Therefore,   for 24 hours, you have the following restrictions:   - DO NOT drive a car, operate machinery, make legal/financial decisions,   sign important papers or drink alcohol.    ACTIVITY:  Today: no heavy lifting, straining or running due to procedural   sedation/anesthesia.  The following day: return to full activity including work.  DIET:  Eat and drink normally unless instructed otherwise.     TREATMENT FOR COMMON SIDE EFFECTS:  - Mild abdominal pain, nausea, belching, bloating or excessive gas:  rest,   eat lightly and use a heating pad.  - Sore Throat: treat with throat lozenges and/or gargle with warm salt   water.  - Because air was used during the procedure, expelling large amounts of air   from your rectum or belching is normal.  - If a bowel prep was taken, you may not have a bowel movement for 1-3 days.    This is normal.  SYMPTOMS TO WATCH FOR AND REPORT TO YOUR PHYSICIAN:  1. Abdominal pain or bloating, other than gas cramps.  2. Chest pain.  3. Back pain.  4. Signs of infection such as: chills or fever occurring within 24 hours   after the procedure.  5. Rectal bleeding, which would show as bright red, maroon, or black stools.   (A tablespoon of blood from the rectum is not serious, especially if    hemorrhoids are present.)  6. Vomiting.  7. Weakness or dizziness.  GO DIRECTLY TO THE NEAREST EMERGENCY ROOM IF YOU HAVE ANY OF THE FOLLOWING:      Difficulty breathing              Chills and/or fever over 101 F   Persistent vomiting and/or vomiting blood   Severe abdominal pain   Severe chest pain   Black, tarry stools   Bleeding- more than one tablespoon   Any other symptom or condition that you feel may need urgent attention  Your doctor recommends these additional instructions:  If any biopsies were taken, your doctors clinic will contact you in 1 to 2   weeks with any results.  - Patient has a contact number available for emergencies.  The signs and   symptoms of potential delayed complications were discussed with the   patient.  Return to normal activities tomorrow.  Written discharge   instructions were provided to the patient.   - Resume previous diet.   - Continue present medications.   - No aspirin, ibuprofen, naproxen, or other non-steroidal anti-inflammatory   drugs.   - Await pathology results.   - Discharge patient to home (ambulatory).   - Follow an antireflux regimen.   - Return to GI office after studies are complete.  For questions, problems or results please call your physician - Indio Pascual MD at Work:  [phone].  [facility], EMERGENCY ROOM PHONE NUMBER: [phone]  IF A COMPLICATION OR EMERGENCY SITUATION ARISES AND YOU ARE UNABLE TO REACH   YOUR PHYSICIAN - GO DIRECTLY TO THE EMERGENCY ROOM.  Indio Pascual MD  7/2/2024 8:52:05 AM  This report has been verified and signed electronically.  Dear patient,  As a result of recent federal legislation (The Federal Cures Act), you may   receive lab or pathology results from your procedure in your [facility]   account before your physician is able to contact you. Your physician or   their representative will relay the results to you with their   recommendations at their soonest availability.  Thank you,  PROVATION

## 2024-07-02 NOTE — TRANSFER OF CARE
"Anesthesia Transfer of Care Note    Patient: Liliana Hermosillo    Procedure(s) Performed: Procedure(s) (LRB):  EGD (ESOPHAGOGASTRODUODENOSCOPY) (N/A)    Patient location: PACU    Anesthesia Type: general    Transport from OR: Transported from OR on 2-3 L/min O2 by NC with adequate spontaneous ventilation    Post pain: adequate analgesia    Post assessment: no apparent anesthetic complications and tolerated procedure well    Post vital signs: stable    Level of consciousness: awake, alert and oriented    Nausea/Vomiting: no nausea/vomiting    Complications: none    Transfer of care protocol was followed      Last vitals: Visit Vitals  BP (!) 144/67 (BP Location: Left arm, Patient Position: Lying)   Pulse 75   Temp 36.6 °C (97.9 °F) (Skin)   Resp 16   Ht 5' 3" (1.6 m)   Wt 61.2 kg (135 lb)   SpO2 98%   Breastfeeding No   BMI 23.91 kg/m²     "

## 2024-07-03 VITALS
SYSTOLIC BLOOD PRESSURE: 138 MMHG | DIASTOLIC BLOOD PRESSURE: 62 MMHG | TEMPERATURE: 98 F | RESPIRATION RATE: 20 BRPM | WEIGHT: 135 LBS | HEART RATE: 66 BPM | HEIGHT: 63 IN | BODY MASS INDEX: 23.92 KG/M2 | OXYGEN SATURATION: 97 %

## 2024-07-03 NOTE — ANESTHESIA POSTPROCEDURE EVALUATION
Anesthesia Post Evaluation    Patient: Liliana Hermosillo    Procedure(s) Performed: Procedure(s) (LRB):  EGD (ESOPHAGOGASTRODUODENOSCOPY) (N/A)    Final Anesthesia Type: general      Patient location during evaluation: PACU  Patient participation: Yes- Able to Participate  Level of consciousness: awake and alert  Post-procedure vital signs: reviewed and stable  Pain management: adequate  Airway patency: patent    PONV status at discharge: No PONV  Anesthetic complications: no      Cardiovascular status: blood pressure returned to baseline  Respiratory status: unassisted and room air  Hydration status: euvolemic  Follow-up not needed.              Vitals Value Taken Time   /62 07/02/24 0915   Temp 36.7 °C (98.1 °F) 07/02/24 0915   Pulse 66 07/02/24 0918   Resp 20 07/02/24 0902   SpO2 98 % 07/02/24 0918   Vitals shown include unfiled device data.      Event Time   Out of Recovery 09:40:37         Pain/David Score: David Score: 10 (7/2/2024  9:06 AM)

## 2024-07-08 NOTE — PROGRESS NOTES
Please notify patient that biopsies reviewed and showed no bacteria.  Polyp was benign.  Continue current meds and follow up as previously planned.

## 2024-07-10 RX ORDER — HYDROCHLOROTHIAZIDE 25 MG/1
25 TABLET ORAL DAILY
Qty: 90 TABLET | Refills: 3 | Status: SHIPPED | OUTPATIENT
Start: 2024-07-10

## 2024-07-10 NOTE — TELEPHONE ENCOUNTER
Refill Routing Note   Medication(s) are not appropriate for processing by [facility] for the following reason(s):        Drug-disease interaction    [facility] action(s):  Defer        Medication Therapy Plan: hydroCHLOROthiazide and CHARLINE (acute kidney injury)    Pharmacist review requested: Yes     Appointments  past 12m or future 3m with PCP    Date Provider   Last Visit   5/27/2024 Keara Matias MD   Next Visit   8/15/2024 Keara Matias MD   ED visits in past 90 days: 0        Note composed:11:00 AM 07/10/2024

## 2024-07-10 NOTE — TELEPHONE ENCOUNTER
Refill Decision Note   Liliana Hermosillo  is requesting a refill authorization.  Brief Assessment and Rationale for Refill:  Approve     Medication Therapy Plan:         Pharmacist review requested: Yes   Extended chart review required: Yes   Comments:     Note composed:12:11 PM 07/10/2024

## 2024-07-15 ENCOUNTER — TELEPHONE (OUTPATIENT)
Dept: FAMILY MEDICINE | Facility: CLINIC | Age: 84
End: 2024-07-15
Payer: MEDICARE

## 2024-07-18 ENCOUNTER — HOSPITAL ENCOUNTER (EMERGENCY)
Facility: HOSPITAL | Age: 84
Discharge: HOME | End: 2024-07-19
Attending: EMERGENCY MEDICINE
Payer: MEDICARE

## 2024-07-18 DIAGNOSIS — K76.89 HEPATIC CYST: ICD-10-CM

## 2024-07-18 DIAGNOSIS — N28.1 RENAL CYST, LEFT: ICD-10-CM

## 2024-07-18 DIAGNOSIS — R53.1 WEAKNESS: ICD-10-CM

## 2024-07-18 DIAGNOSIS — R42 DIZZINESS: ICD-10-CM

## 2024-07-18 DIAGNOSIS — R11.2 NAUSEA AND VOMITING, UNSPECIFIED VOMITING TYPE: Primary | ICD-10-CM

## 2024-07-18 LAB
ALBUMIN SERPL BCP-MCNC: 4.3 G/DL (ref 3.5–5.2)
ALP SERPL-CCNC: 38 U/L (ref 55–135)
ALT SERPL W/O P-5'-P-CCNC: 36 U/L (ref 10–44)
ANION GAP SERPL CALC-SCNC: 13 MMOL/L (ref 8–16)
AST SERPL-CCNC: 34 U/L (ref 10–40)
BASOPHILS # BLD AUTO: 0.03 K/UL (ref 0–0.2)
BASOPHILS NFR BLD: 0.3 % (ref 0–1.9)
BILIRUB SERPL-MCNC: 0.7 MG/DL (ref 0.1–1)
BUN SERPL-MCNC: 20 MG/DL (ref 8–23)
CALCIUM SERPL-MCNC: 10.1 MG/DL (ref 8.7–10.5)
CHLORIDE SERPL-SCNC: 98 MMOL/L (ref 95–110)
CO2 SERPL-SCNC: 30 MMOL/L (ref 23–29)
CREAT SERPL-MCNC: 1 MG/DL (ref 0.5–1.4)
DIFFERENTIAL METHOD BLD: ABNORMAL
EOSINOPHIL # BLD AUTO: 0 K/UL (ref 0–0.5)
EOSINOPHIL NFR BLD: 0.4 % (ref 0–8)
ERYTHROCYTE [DISTWIDTH] IN BLOOD BY AUTOMATED COUNT: 12.4 % (ref 11.5–14.5)
EST. GFR  (NO RACE VARIABLE): 56 ML/MIN/1.73 M^2
GLUCOSE SERPL-MCNC: 123 MG/DL (ref 70–110)
HCT VFR BLD AUTO: 39.7 % (ref 37–48.5)
HGB BLD-MCNC: 13.3 G/DL (ref 12–16)
IMM GRANULOCYTES # BLD AUTO: 0.05 K/UL (ref 0–0.04)
IMM GRANULOCYTES NFR BLD AUTO: 0.5 % (ref 0–0.5)
LACTATE SERPL-SCNC: 1 MMOL/L (ref 0.5–2.2)
LIPASE SERPL-CCNC: 17 U/L (ref 4–60)
LYMPHOCYTES # BLD AUTO: 1.1 K/UL (ref 1–4.8)
LYMPHOCYTES NFR BLD: 11.3 % (ref 18–48)
MCH RBC QN AUTO: 29.6 PG (ref 27–31)
MCHC RBC AUTO-ENTMCNC: 33.5 G/DL (ref 32–36)
MCV RBC AUTO: 88 FL (ref 82–98)
MONOCYTES # BLD AUTO: 0.5 K/UL (ref 0.3–1)
MONOCYTES NFR BLD: 5.1 % (ref 4–15)
NEUTROPHILS # BLD AUTO: 8.3 K/UL (ref 1.8–7.7)
NEUTROPHILS NFR BLD: 82.4 % (ref 38–73)
NRBC BLD-RTO: 0 /100 WBC
PLATELET # BLD AUTO: 204 K/UL (ref 150–450)
PMV BLD AUTO: 10.4 FL (ref 9.2–12.9)
POTASSIUM SERPL-SCNC: 3.3 MMOL/L (ref 3.5–5.1)
PROT SERPL-MCNC: 7.1 G/DL (ref 6–8.4)
RBC # BLD AUTO: 4.49 M/UL (ref 4–5.4)
SODIUM SERPL-SCNC: 141 MMOL/L (ref 136–145)
WBC # BLD AUTO: 10.12 K/UL (ref 3.9–12.7)

## 2024-07-18 PROCEDURE — 96374 THER/PROPH/DIAG INJ IV PUSH: CPT

## 2024-07-18 PROCEDURE — 85025 COMPLETE CBC W/AUTO DIFF WBC: CPT | Performed by: EMERGENCY MEDICINE

## 2024-07-18 PROCEDURE — 80053 COMPREHEN METABOLIC PANEL: CPT | Performed by: EMERGENCY MEDICINE

## 2024-07-18 PROCEDURE — 99285 EMERGENCY DEPT VISIT HI MDM: CPT | Mod: 25

## 2024-07-18 PROCEDURE — 83605 ASSAY OF LACTIC ACID: CPT | Performed by: EMERGENCY MEDICINE

## 2024-07-18 PROCEDURE — 83690 ASSAY OF LIPASE: CPT | Performed by: EMERGENCY MEDICINE

## 2024-07-18 PROCEDURE — 36415 COLL VENOUS BLD VENIPUNCTURE: CPT | Performed by: EMERGENCY MEDICINE

## 2024-07-18 PROCEDURE — 25000003 PHARM REV CODE 250: Performed by: EMERGENCY MEDICINE

## 2024-07-18 PROCEDURE — 96361 HYDRATE IV INFUSION ADD-ON: CPT

## 2024-07-18 PROCEDURE — 63600175 PHARM REV CODE 636 W HCPCS: Performed by: EMERGENCY MEDICINE

## 2024-07-18 RX ORDER — ONDANSETRON HYDROCHLORIDE 2 MG/ML
4 INJECTION, SOLUTION INTRAVENOUS
Status: COMPLETED | OUTPATIENT
Start: 2024-07-18 | End: 2024-07-18

## 2024-07-18 RX ORDER — MECLIZINE HYDROCHLORIDE 25 MG/1
25 TABLET ORAL
Status: COMPLETED | OUTPATIENT
Start: 2024-07-18 | End: 2024-07-18

## 2024-07-18 RX ADMIN — ONDANSETRON 4 MG: 2 INJECTION INTRAMUSCULAR; INTRAVENOUS at 11:07

## 2024-07-18 RX ADMIN — MECLIZINE HYDROCHLORIDE 25 MG: 25 TABLET ORAL at 11:07

## 2024-07-18 RX ADMIN — SODIUM CHLORIDE 1000 ML: 9 INJECTION, SOLUTION INTRAVENOUS at 11:07

## 2024-07-19 VITALS
DIASTOLIC BLOOD PRESSURE: 87 MMHG | HEART RATE: 83 BPM | SYSTOLIC BLOOD PRESSURE: 142 MMHG | OXYGEN SATURATION: 98 % | WEIGHT: 135 LBS | RESPIRATION RATE: 16 BRPM | TEMPERATURE: 98 F | BODY MASS INDEX: 23.92 KG/M2 | HEIGHT: 63 IN

## 2024-07-19 LAB
BILIRUB UR QL STRIP: NEGATIVE
CLARITY UR: CLEAR
COLOR UR: YELLOW
GLUCOSE UR QL STRIP: NEGATIVE
HGB UR QL STRIP: NEGATIVE
KETONES UR QL STRIP: NEGATIVE
LEUKOCYTE ESTERASE UR QL STRIP: NEGATIVE
NITRITE UR QL STRIP: NEGATIVE
PH UR STRIP: 7 [PH] (ref 5–8)
PROT UR QL STRIP: NEGATIVE
SP GR UR STRIP: >1.03 (ref 1–1.03)
URN SPEC COLLECT METH UR: ABNORMAL
UROBILINOGEN UR STRIP-ACNC: NEGATIVE EU/DL

## 2024-07-19 PROCEDURE — 93005 ELECTROCARDIOGRAM TRACING: CPT

## 2024-07-19 PROCEDURE — 25500020 PHARM REV CODE 255

## 2024-07-19 PROCEDURE — 81003 URINALYSIS AUTO W/O SCOPE: CPT | Performed by: EMERGENCY MEDICINE

## 2024-07-19 PROCEDURE — 93010 ELECTROCARDIOGRAM REPORT: CPT | Mod: ,,, | Performed by: GENERAL PRACTICE

## 2024-07-19 RX ORDER — ONDANSETRON 4 MG/1
4 TABLET, ORALLY DISINTEGRATING ORAL EVERY 8 HOURS PRN
Qty: 15 TABLET | Refills: 0 | Status: SHIPPED | OUTPATIENT
Start: 2024-07-19

## 2024-07-19 RX ORDER — MECLIZINE HYDROCHLORIDE 25 MG/1
25 TABLET ORAL 3 TIMES DAILY PRN
Qty: 20 TABLET | Refills: 0 | Status: SHIPPED | OUTPATIENT
Start: 2024-07-19

## 2024-07-19 RX ADMIN — IOHEXOL 75 ML: 350 INJECTION, SOLUTION INTRAVENOUS at 12:07

## 2024-07-19 NOTE — ED PROVIDER NOTES
"Encounter Date: 7/18/2024       History     Chief Complaint   Patient presents with    Vomiting     Pt brought to ED via EMS with complaint of vomiting.       Emergent eval of a 84-year-old female with history of ovarian cyst, chronic constipation, diverticulitis, GERD, hyperlipidemia hypertension mitral valve prolapse status post appy: Hysterectomy also has paroxysmal AFib presents to the ER after she ate spring rolls with hot and sour sauce that she reports tasted funny at 6:00 p.m. and began having nausea vomiting dizziness and generalized weakness at 7:00 p.m. she reports she now feels that she "can not control her body" in her whole-body is jelly reporting generalized weakness and difficulty lifting her arms or lifting her head.  She reports the room is spinning.  She vomited 4 times and has been dry heaving she reports improvement after nausea medicine with EMS.  But mild nausea still present no further vomiting.  Also reports legs are weak.  Weakness is equal not worse on 1 side.  Reports no loss of vision or blurred vision.  No slurring of speech expressive aphasia or confusion.  No fevers chills or sweats.  No urinary symptoms no chest pain or shortness of breath      Review of patient's allergies indicates:   Allergen Reactions    Gabapentin Swelling     Swelling of face    Pantoprazole     Monosodium glutamate Palpitations and Rash     Past Medical History:   Diagnosis Date    Atypical ductal hyperplasia, breast     Chronic constipation     Diverticulitis     Diverticulosis     Edema     Hypertension     Mitral valve prolapse     Ovarian cyst      Past Surgical History:   Procedure Laterality Date    ANKLE ARTHROSCOPY W/ OPEN REPAIR      APPENDECTOMY  1972    BREAST BIOPSY Left     CHOLECYSTECTOMY      COLONOSCOPY      EPIDURAL STEROID INJECTION N/A 7/6/2022    Procedure: Injection, Steroid, Epidural NAIDA C7-T1;  Surgeon: Kobe Rodríguez MD;  Location: [facility];  Service: Pain Management;  Laterality: " N/A;  Medicare    EPIDURAL STEROID INJECTION INTO CERVICAL SPINE N/A 3/30/2022    Procedure: Injection-steroid-epidural-cervical;  Surgeon: Kobe Rodríguez MD;  Location: [facility];  Service: Pain Management;  Laterality: N/A;  c7-t1    ESOPHAGOGASTRODUODENOSCOPY N/A 2024    Procedure: EGD (ESOPHAGOGASTRODUODENOSCOPY);  Surgeon: Indio Galvez MD;  Location: [facility];  Service: Endoscopy;  Laterality: N/A;    EYE SURGERY  2012    HYSTERECTOMY      INJECTION OF NERVE Bilateral 2022    Procedure: INJECTION, NERVE;  Surgeon: Kobe Rodríguez MD;  Location: [facility] OR;  Service: Anesthesiology;  Laterality: Bilateral;  C2,3 Mbb and TON block    INJECTION OF NERVE Bilateral 10/12/2022    Procedure: INJECTION, NERVE;  Surgeon: Kobe Rodríguez MD;  Location: [facility] OR;  Service: Anesthesiology;  Laterality: Bilateral;  C2, C3 and TON  MBB    KNEE SURGERY      RADIOFREQUENCY ABLATION Bilateral 2022    Procedure: Radiofrequency Ablation C2-C3 and TON;  Surgeon: Kobe Rodríguez MD;  Location: [facility];  Service: Pain Management;  Laterality: Bilateral;    TONSILLECTOMY      UPPER GASTROINTESTINAL ENDOSCOPY       Family History   Problem Relation Name Age of Onset    Colon cancer Mother      Anuerysm Father      Breast cancer Sister Lucinda Mangeot     Breast cancer Maternal Aunt Alyce     Collagen disease Neg Hx      Ovarian cancer Neg Hx       Social History     Tobacco Use    Smoking status: Former     Current packs/day: 0.00     Average packs/day: 2.0 packs/day for 25.9 years (51.8 ttl pk-yrs)     Types: Cigarettes     Start date: 1960     Quit date: 1985     Years since quittin.6    Smokeless tobacco: Never   Substance Use Topics    Alcohol use: Yes     Alcohol/week: 5.0 standard drinks of alcohol     Types: 4 Glasses of wine, 1 Shots of liquor per week    Drug use: No     Review of Systems   Constitutional:  Positive for fatigue. Negative for activity change, appetite change, chills,  diaphoresis and fever.   HENT:  Negative for congestion, postnasal drip, rhinorrhea and sore throat.    Respiratory:  Negative for cough, chest tightness, shortness of breath, wheezing and stridor.    Cardiovascular:  Negative for chest pain and palpitations.   Gastrointestinal:  Positive for nausea and vomiting. Negative for abdominal distention, abdominal pain, blood in stool, constipation and diarrhea.   Genitourinary:  Positive for difficulty urinating. Negative for dysuria, flank pain, frequency, hematuria and urgency.   Musculoskeletal:  Negative for arthralgias, back pain, myalgias, neck pain and neck stiffness.   Skin:  Negative for rash.   Neurological:  Positive for dizziness, weakness and light-headedness. Negative for tremors, seizures, syncope, facial asymmetry, speech difficulty, numbness and headaches.   Hematological:  Does not bruise/bleed easily.   Psychiatric/Behavioral:  Negative for confusion. The patient is not nervous/anxious.    All other systems reviewed and are negative.      Physical Exam     Initial Vitals [07/18/24 2112]   BP Pulse Resp Temp SpO2   (!) 152/90 72 17 98.7 °F (37.1 °C) 99 %      MAP       --         Physical Exam    Nursing note and vitals reviewed.  Constitutional: She appears well-developed and well-nourished. She is not diaphoretic. No distress.   HENT:   Head: Normocephalic and atraumatic.   Right Ear: External ear normal.   Left Ear: External ear normal.   Nose: Nose normal.   Mouth/Throat: Oropharynx is clear and moist.   Eyes: Conjunctivae and EOM are normal. Pupils are equal, round, and reactive to light.   When looking superiorly pt has horizontal nystagmus.  Dizziness when patient looks down from having looked up.    No nystagmus laterally.   Normal EOM             Neck: Neck supple. No tracheal deviation present.   Normal range of motion.  Cardiovascular:  Normal rate, regular rhythm, normal heart sounds and intact distal pulses.     Exam reveals no gallop and no  "friction rub.       No murmur heard.  141/65 pulse 78   Pulmonary/Chest: Breath sounds normal. No stridor. No respiratory distress. She has no wheezes. She has no rhonchi. She has no rales. She exhibits no tenderness.   Sats 95% on room air respirations 16   Abdominal: Abdomen is soft. Bowel sounds are normal. She exhibits no distension and no mass. There is no abdominal tenderness. There is no rebound and no guarding.   Musculoskeletal:         General: No edema. Normal range of motion.      Cervical back: Normal range of motion and neck supple.     Neurological: She is alert and oriented to person, place, and time. She has normal strength. No cranial nerve deficit or sensory deficit.   GCS 15 patient was speaks slowly no aphasia no slurred speech.  No cranial nerve deficits.  Nystagmus mentioned under eye exam.  Patient was given very dizzy when she looks up and then down or if she turns her head or sits up.  She reports a generalized weakness but no weakness when strength testing upper lower extremities no pronator drift.  Normal sensation throughout.  Patient reports she can not walk because her whole-body is "jelly"   Skin: Skin is warm and dry. No rash noted. No erythema. No pallor.   Psychiatric: She has a normal mood and affect. Her behavior is normal. Judgment and thought content normal.         ED Course   Procedures  Labs Reviewed   CBC W/ AUTO DIFFERENTIAL - Abnormal; Notable for the following components:       Result Value    Gran # (ANC) 8.3 (*)     Immature Grans (Abs) 0.05 (*)     Gran % 82.4 (*)     Lymph % 11.3 (*)     All other components within normal limits   COMPREHENSIVE METABOLIC PANEL - Abnormal; Notable for the following components:    Potassium 3.3 (*)     CO2 30 (*)     Glucose 123 (*)     Alkaline Phosphatase 38 (*)     eGFR 56 (*)     All other components within normal limits   URINALYSIS, REFLEX TO URINE CULTURE - Abnormal; Notable for the following components:    Specific Gravity, UA " >1.030 (*)     All other components within normal limits    Narrative:     Specimen Source->Urine   LIPASE   LACTIC ACID, PLASMA     EKG Readings: (Independently Interpreted)   Sinus rhythm with first-degree AV block rate 81 inverted T-waves in lead 2 3 AVF V5 V6 no ST elevations.  Prolonged QT of 508 milliseconds    No change compared to July 1, 2014       Imaging Results              CT Abdomen Pelvis With IV Contrast NO Oral Contrast (Final result)  Result time 07/19/24 02:41:14      Final result by Antonio Garcia MD (07/19/24 02:41:14)                   Impression:      No acute findings in the abdomen.  Colonic diverticulosis.  Hepatic and left renal simple cysts.      Electronically signed by: Antonio Garcia  Date:    07/19/2024  Time:    02:41               Narrative:    EXAMINATION:  CT ABDOMEN PELVIS WITH IV CONTRAST    CLINICAL HISTORY:  Abdominal abscess/infection suspected;    TECHNIQUE:  Low dose axial images, sagittal and coronal reformations were obtained from the lung bases to the pubic symphysis following the IV administration of 75 mL of Omnipaque 350 .  Oral contrast was not given.    COMPARISON:  10/10/2019    FINDINGS:  Lung bases clear.  Normal size heart.  Cholecystectomy.    There are at least 2 simple cysts in the liver, 1 in each lobe, and multiple additional hepatic hypodense lesions which are subcentimeter in size and too small to adequately characterize.  There is a bilobed exophytic but otherwise simple cystic lesion along the left kidney midpole which measures 5.5 cm.  Remaining solid abdominal organs are unremarkable.    There is no enteric contrast which limits bowel assessment.  No dilated bowel loops.  Multifocal colonic diverticula.  Mild degree of colon.    Atherosclerosis.  Hysterectomy.  Urinary bladder unremarkable.    Moderate degenerative disease at L5-S1.  Fixation screws along the right femoral neck.  Unchanged subtle focal areas of sclerosis along each femoral head  superiorly which raises the possibility for avascular necrosis.                                       CT Head Without Contrast (Final result)  Result time 07/19/24 02:34:27      Final result by Antonio Garcia MD (07/19/24 02:34:27)                   Impression:      1. No intracranial hemorrhage.  No evidence for recent ischemia, noting however that MRI could add sensitivity in an acute setting.  2. Mild generalized cerebral atrophy.      Electronically signed by: Antonio Garcia  Date:    07/19/2024  Time:    02:34               Narrative:    EXAMINATION:  CT HEAD WITHOUT CONTRAST    CLINICAL HISTORY:  Dizziness, persistent/recurrent, cardiac or vascular cause suspected;    TECHNIQUE:  Low dose axial images were obtained through the head.  Coronal and sagittal reformations were also performed. Contrast was not administered.    COMPARISON:  04/23/2023    FINDINGS:  There are involutional changes with normal size of the ventricular system.    No mass, hemorrhage or acute major vascular distribution infarct. No mass effect or midline shift. Surgical changes of the globes.   Paranasal sinuses and mastoid air cells are clear.  No acute osseous abnormality.                                       Medications   sodium chloride 0.9% bolus 1,000 mL 1,000 mL (0 mLs Intravenous Stopped 7/19/24 0102)   meclizine tablet 25 mg (25 mg Oral Given 7/18/24 2343)   ondansetron injection 4 mg (4 mg Intravenous Given 7/18/24 2344)   iohexoL (OMNIPAQUE 350) 350 mg iodine/mL injection (75 mLs Intravenous Given 7/19/24 0001)     Medical Decision Making  Emergent eval of a 84-year-old female with history of ovarian cyst, chronic constipation, diverticulitis, GERD, hyperlipidemia hypertension mitral valve prolapse status post appy: Hysterectomy also has paroxysmal AFib presents to the ER after she ate spring rolls with hot and sour sauce that she reports tasted funny at 6:00 p.m. and began having nausea vomiting dizziness and  "generalized weakness at 7:00 p.m. she reports she now feels that she "can not control her body" in her whole-body is jelly reporting generalized weakness and difficulty lifting her arms or lifting her head.  She reports the room is spinning.  She vomited 4 times and has been dry heaving she reports improvement after nausea medicine with EMS.  But mild nausea still present no further vomiting.  Also reports legs are weak.  Weakness is equal not worse on 1 side.  Reports no loss of vision or blurred vision.  No slurring of speech expressive aphasia or confusion.  No fevers chills or sweats.  No urinary symptoms no chest pain or shortness of breath  On exam patient was laying at 30° in bed with her head upright.  Her cell phone in her lap soft nontender abdomen no rebound or guarding normal bowel sounds.  Normal cardiac and lung exam when she sits up she becomes dizzy.  She has horizontal nystagmus when she looks upright and then when she looks down becomes very dizzy she also gets dizzy when she moves.  No lateral nystagmus no nystagmus when she was looking straight ahead.  No cranial nerve deficits generalized weakness but no focal weakness normal sensation.  Have not tested gait at this time because she reports that she does not believe she can stand.  Will give IV fluids meclizine and Zofran and they will reassess.  Patient was awaiting CT head  and abd pain without contrast    MDM    Patient presents for emergent evaluation of acute dizziness, nausea vomiting, generalized weakness that poses a threat to life and/or bodily function.   Differential diagnosis includes but was not limited to CVA intracranial hemorrhage cerebral edema normal pressure hydrocephalus, malignancy, BPPV Meniere's vestibular neuritis labyrinthitis cholesterol steatosis,, , sinusitis vertebral artery dissection orthostasis dehydration electrolyte abnormality hyper or hypoglycemia, gastritis gastroenteritis H pylori colitis.   In the ED " patient found to have acute, dizziness, nausea vomiting diverticulosis, hepatic and left renal cyst.    I ordered labs and personally reviewed them.  Labs significant for see below  I ordered EKG and personally reviewed it.  EKG significant for see above.    I ordered CT scan and personally reviewed it and reviewed the radiologist interpretation.  CT significant for Narrative & Impression  EXAMINATION:  CT HEAD WITHOUT CONTRAST     CLINICAL HISTORY:  Dizziness, persistent/recurrent, cardiac or vascular cause suspected;     TECHNIQUE:  Low dose axial images were obtained through the head.  Coronal and sagittal reformations were also performed. Contrast was not administered.     COMPARISON:  04/23/2023     FINDINGS:  There are involutional changes with normal size of the ventricular system.    No mass, hemorrhage or acute major vascular distribution infarct. No mass effect or midline shift. Surgical changes of the globes.   Paranasal sinuses and mastoid air cells are clear.  No acute osseous abnormality.     Impression:     1. No intracranial hemorrhage.  No evidence for recent ischemia, noting however that MRI could add sensitivity in an acute setting.  2. Mild generalized cerebral atrophy.  CT Abdomen Pelvis With IV Contrast NO Oral Contrast (Final result)  Result time 07/19/24 02:41:14  Final result by Antonio Garcai MD (07/19/24 02:41:14)             Impression:       No acute findings in the abdomen.  Colonic diverticulosis.  Hepatic and left renal simple cysts.                 Discharge MDM     Patient was managed in the ED with IV meclizine 25 mg, 1 L normal saline 4 mg of Zofran   The response to treatment was improved.  Patient was wheeled in a wheelchair to the restroom but he was able to ambulate into the restroom use the restroom on her own.  Patient was also able to ambulate into the hallway and back a proximally 20 ft using a walker with a stable gait.  She was not ataxic.  She reports she was  feeling improved.  She was verbalize to me that she was not believe she was going to ride until around 9:00 a.m..  It was currently 3:00 a.m..  I discussed with her that we will be contacting her emergency contacts for a ride home or we could get her a ride and a cab at 6:00 a.m..  Patient reports she was unsure she knows how to get into her home but he was she believes she may have lost power.  I have informed her that we will talk to her ride home to ensure that she was given to her home or go to their home prior to discharge.  But I have explained that I can not keep her in the ER for another 6 hours due to trasportation issues .  We were able to get in contact with her son who reports he will be able to come get her.      Patient was discharged in stable condition.  Detailed return precautions discussed.  Patient was told to follow up with primary care physician or specialist based on their diagnosis  Keiry Meyer MD      Amount and/or Complexity of Data Reviewed  Labs: ordered. Decision-making details documented in ED Course.     Details: Urine with increased specific gravity otherwise normal  Radiology: ordered and independent interpretation performed.  ECG/medicine tests: ordered and independent interpretation performed.    Risk  Prescription drug management.               ED Course as of 07/19/24 0302   Fri Jul 19, 2024   0001 Lactate 1  Normal CBC  K 3.3 co2 30  glucose 123  Lipase 17  [RM]   0028 CT head and abdomen pelvis has been completed but there is a V rad internal  air and CT techs are unable to send over CT studies for radiology read at this time.  Keiry Meyer M.D.  12:29 AM 7/19/2024   [RM]   0203 Vrad is having technical issues and radiology can not read CT at this time. Working with radiology dept about how to have CT abd read. [RM]   0207 Patient was resting she reports she was beginning to feel improved. [RM]      ED Course User Index  [TAURUS] Keiry Meyer MD                              Clinical Impression:  Final diagnoses:  [R53.1] Weakness                 Keiry Meyer MD  07/19/24 0302       Keiry Meyer MD  07/19/24 032

## 2024-07-19 NOTE — ED NOTES
Patient aware that I need urine specimen, she stated she can not use the restroom at the moment.

## 2024-07-19 NOTE — ED NOTES
Assisted patient to restroom via wheelchair, patient able to ambulated with assisted rails in restroom and on wheel chair.

## 2024-07-19 NOTE — ED NOTES
Patient came into ER via ambulance with complaints of dizziness and vomiting. Patient stated when she moves her head she gets nauseous. Patient alert and oriented x 4, patient stated she ambulates at home with a walker.

## 2024-07-22 ENCOUNTER — PATIENT OUTREACH (OUTPATIENT)
Dept: EMERGENCY MEDICINE | Facility: HOSPITAL | Age: 84
End: 2024-07-22

## 2024-07-23 ENCOUNTER — PATIENT MESSAGE (OUTPATIENT)
Dept: GASTROENTEROLOGY | Facility: CLINIC | Age: 84
End: 2024-07-23
Payer: MEDICARE

## 2024-07-23 ENCOUNTER — TELEPHONE (OUTPATIENT)
Dept: UROLOGY | Facility: CLINIC | Age: 84
End: 2024-07-23
Payer: MEDICARE

## 2024-07-23 DIAGNOSIS — K21.9 GASTROESOPHAGEAL REFLUX DISEASE WITHOUT ESOPHAGITIS: ICD-10-CM

## 2024-07-23 DIAGNOSIS — Z79.899 LONG-TERM CURRENT USE OF PROTON PUMP INHIBITOR THERAPY: ICD-10-CM

## 2024-07-23 DIAGNOSIS — R11.0 NAUSEA: ICD-10-CM

## 2024-07-23 LAB
OHS QRS DURATION: 86 MS
OHS QTC CALCULATION: 508 MS

## 2024-07-23 RX ORDER — SUCRALFATE 1 G/1
1 TABLET ORAL
Qty: 40 TABLET | Refills: 0 | Status: SHIPPED | OUTPATIENT
Start: 2024-07-23 | End: 2024-08-02

## 2024-07-23 RX ORDER — OMEPRAZOLE 40 MG/1
40 CAPSULE, DELAYED RELEASE ORAL
Qty: 180 CAPSULE | Refills: 3 | Status: SHIPPED | OUTPATIENT
Start: 2024-07-23 | End: 2025-07-23

## 2024-07-23 NOTE — TELEPHONE ENCOUNTER
----- Message from Megha Villafana sent at 7/23/2024  7:44 AM CDT -----  Contact: Self  Type: Needs Medical Advice  Who Called:  Patient  Symptoms (please be specific):  She wants to know about the meds that she should or not not be taken as she not been advised.  Best Call Back Number:  [phone]  Additional Information:  Please call the patient back at the phone number listed above to advise. Thank you!

## 2024-07-24 ENCOUNTER — TELEPHONE (OUTPATIENT)
Dept: GASTROENTEROLOGY | Facility: CLINIC | Age: 84
End: 2024-07-24
Payer: MEDICARE

## 2024-07-24 NOTE — TELEPHONE ENCOUNTER
Placed call to patient to advise that she should tern in a stool sample. Alos provide instruction for medication after she had turned in sample. Specimen cup can be obtained from the lab at [facility].

## 2024-07-24 NOTE — TELEPHONE ENCOUNTER
----- Message from Juani Brasher NP sent at 7/24/2024 11:39 AM CDT -----  She hasn't seen my mychart message - could you call her and follow up please?  ----- Message -----  From: Barbi Chand LPN  Sent: 7/23/2024   3:58 PM CDT  To: Juani Brasher NP    She said he symproms have come back. Does she need to start back on the Prilosec and Carafate?  ----- Message -----  From: Malena Mcdaniel  Sent: 7/23/2024   3:34 PM CDT  To: Sameera Gloria Staff    Type: Needs Medical Advice  Who Called:  pt  Best Call Back Number: [phone]  Additional Information: pt is calling in regards to needing to speak to a nurse in the office about stomach problems that she is having. Please call back and advise. Thanks!

## 2024-07-25 ENCOUNTER — LAB VISIT (OUTPATIENT)
Dept: LAB | Facility: HOSPITAL | Age: 84
End: 2024-07-25
Payer: MEDICARE

## 2024-07-25 DIAGNOSIS — A04.8 H. PYLORI INFECTION: ICD-10-CM

## 2024-07-25 PROCEDURE — 87338 HPYLORI STOOL AG IA: CPT

## 2024-07-30 DIAGNOSIS — Z00.00 ENCOUNTER FOR MEDICARE ANNUAL WELLNESS EXAM: ICD-10-CM

## 2024-08-06 ENCOUNTER — PATIENT MESSAGE (OUTPATIENT)
Dept: FAMILY MEDICINE | Facility: CLINIC | Age: 84
End: 2024-08-06
Payer: MEDICARE

## 2024-08-12 ENCOUNTER — LAB VISIT (OUTPATIENT)
Dept: LAB | Facility: HOSPITAL | Age: 84
End: 2024-08-12
Attending: FAMILY MEDICINE
Payer: MEDICARE

## 2024-08-12 DIAGNOSIS — E11.9 TYPE 2 DIABETES MELLITUS WITHOUT COMPLICATION, WITH LONG-TERM CURRENT USE OF INSULIN: ICD-10-CM

## 2024-08-12 DIAGNOSIS — Z79.4 TYPE 2 DIABETES MELLITUS WITHOUT COMPLICATION, WITH LONG-TERM CURRENT USE OF INSULIN: ICD-10-CM

## 2024-08-12 LAB
ESTIMATED AVG GLUCOSE: 108 MG/DL (ref 68–131)
HBA1C MFR BLD: 5.4 % (ref 4–5.6)

## 2024-08-12 PROCEDURE — 36415 COLL VENOUS BLD VENIPUNCTURE: CPT | Performed by: FAMILY MEDICINE

## 2024-08-12 PROCEDURE — 83036 HEMOGLOBIN GLYCOSYLATED A1C: CPT | Performed by: FAMILY MEDICINE

## 2024-08-15 ENCOUNTER — OFFICE VISIT (OUTPATIENT)
Dept: FAMILY MEDICINE | Facility: CLINIC | Age: 84
End: 2024-08-15
Payer: MEDICARE

## 2024-08-15 VITALS
RESPIRATION RATE: 18 BRPM | BODY MASS INDEX: 24.52 KG/M2 | DIASTOLIC BLOOD PRESSURE: 74 MMHG | HEIGHT: 63 IN | HEART RATE: 81 BPM | OXYGEN SATURATION: 97 % | SYSTOLIC BLOOD PRESSURE: 138 MMHG | WEIGHT: 138.38 LBS

## 2024-08-15 DIAGNOSIS — E78.5 DYSLIPIDEMIA: ICD-10-CM

## 2024-08-15 DIAGNOSIS — N18.31 STAGE 3A CHRONIC KIDNEY DISEASE: ICD-10-CM

## 2024-08-15 DIAGNOSIS — I10 PRIMARY HYPERTENSION: Primary | ICD-10-CM

## 2024-08-15 PROCEDURE — 99999 PR PBB SHADOW E&M-EST. PATIENT-LVL IV: CPT | Mod: PBBFAC,,, | Performed by: FAMILY MEDICINE

## 2024-08-15 PROCEDURE — 99214 OFFICE O/P EST MOD 30 MIN: CPT | Mod: S$PBB,,, | Performed by: FAMILY MEDICINE

## 2024-08-15 PROCEDURE — 99214 OFFICE O/P EST MOD 30 MIN: CPT | Mod: PBBFAC,PN | Performed by: FAMILY MEDICINE

## 2024-08-15 NOTE — PROGRESS NOTES
Subjective:       Patient ID: Liliana Hermosillo is a 84 y.o. female.    Chief Complaint: Follow-up and Dizziness    Ms. Hermosillo presents today to discuss recent labs.   She tells me that she is concerned that they show that she could have a blood cancer of some sort.   Reviewed all recent labs. Noted that when she was in the ED there was a mild elevation in immature granulocytes. She was acutely ill with vertigo and vomiting and under an acute stress reaction.   Other recent CBCs have been normal. She did also have an acute worsening of her renal function in May which has improved to normal.   Lastly she did have a recent A1C check that was in the normal range.     She is overall feeling better right now than she has in a long time. She feels like the left ear is not stopped up but still a rattly issue due to her hearing loss. She is seeing Dr. Jaramillo for this.         .  Patient Active Problem List   Diagnosis    Closed right hip fracture    Hypertension    Dyslipidemia    Gastroesophageal reflux disease    Sciatica of right side    Family history of colon cancer    Family history of lung cancer    Vertebral compression fracture    Age-related osteoporosis without current pathological fracture    Constipation    Diverticulosis    Multiple thyroid nodules    Advice given about COVID-19 virus infection    Stiffness of cervical spine    Neck pain, musculoskeletal    Gait instability    Abdominal pain    Hiatal hernia    Fatty liver    Paroxysmal atrial fibrillation    Aortic atherosclerosis    Insomnia    Degenerative disease of nervous system, unspecified    Stage 3a chronic kidney disease     Liliana has a current medication list which includes the following prescription(s): ashwagandha root extract, azelastine, clobetasol, fluconazole, fluticasone propionate, hydrochlorothiazide, hydroxyzine pamoate, ibuprofen, ipratropium, ipratropium, meclizine, multivitamin, nexletol, omeprazole, ondansetron, ondansetron,  promethazine, trazodone, turmeric, vitamin e, and zolpidem.    Review of Systems   Constitutional:  Negative for activity change, appetite change, fatigue and fever.   Respiratory:  Negative for shortness of breath.    Gastrointestinal:  Negative for abdominal pain.   Integumentary:  Negative for rash.         Health Maintenance Due   Topic Date Due    Pneumococcal Vaccines (Age 65+) (1 of 2 - PCV) Never done    Shingles Vaccine (1 of 2) Never done    RSV Vaccine (Age 60+ and Pregnant patients) (1 - 1-dose 60+ series) Never done    COVID-19 Vaccine (3 - 2023-24 season) 09/01/2023    DEXA Scan  11/08/2024      Health Maintenance reviewed and discussed- No vaccines requested today. Encouraged to get DEXA in the fall.    Objective:      Physical Exam  Vitals and nursing note reviewed.   Constitutional:       General: She is not in acute distress.     Appearance: She is not ill-appearing.   Cardiovascular:      Rate and Rhythm: Normal rate and regular rhythm.      Heart sounds: No murmur heard.  Pulmonary:      Effort: Pulmonary effort is normal.      Breath sounds: Normal breath sounds. No wheezing.   Skin:     General: Skin is warm and dry.      Findings: No rash.   Neurological:      Mental Status: She is alert.   Psychiatric:         Mood and Affect: Mood normal.         Behavior: Behavior normal.         Assessment:       1. Primary hypertension    2. Dyslipidemia    3. Stage 3a chronic kidney disease        Plan:       1. Primary hypertension  Assessment & Plan:  Stable. Well controlled. Continue current medications.         2. Dyslipidemia  Assessment & Plan:  Stable. Well controlled. Continue current medications.   On bempedoic acid        3. Stage 3a chronic kidney disease  Assessment & Plan:  Much improved since her CHARLINE in May. Much improved.

## 2024-08-27 ENCOUNTER — PATIENT MESSAGE (OUTPATIENT)
Dept: OTOLARYNGOLOGY | Facility: CLINIC | Age: 84
End: 2024-08-27
Payer: MEDICARE

## 2024-08-27 NOTE — TELEPHONE ENCOUNTER
Appointment option for tomorrow offered pt. Stated her vertigo is so bad she can not walk. Informed pt we would send he message to Dr. Jaramillo.

## 2024-08-28 RX ORDER — DEXAMETHASONE 2 MG/1
TABLET ORAL
Qty: 5 TABLET | Refills: 2 | Status: SHIPPED | OUTPATIENT
Start: 2024-08-28

## 2024-09-05 ENCOUNTER — OFFICE VISIT (OUTPATIENT)
Dept: OTOLARYNGOLOGY | Facility: CLINIC | Age: 84
End: 2024-09-05
Payer: MEDICARE

## 2024-09-05 ENCOUNTER — CLINICAL SUPPORT (OUTPATIENT)
Dept: AUDIOLOGY | Facility: CLINIC | Age: 84
End: 2024-09-05
Payer: MEDICARE

## 2024-09-05 VITALS — BODY MASS INDEX: 24.27 KG/M2 | WEIGHT: 137 LBS | HEIGHT: 63 IN

## 2024-09-05 DIAGNOSIS — H90.A31 MIXED CONDUCTIVE AND SENSORINEURAL HEARING LOSS OF RIGHT EAR WITH RESTRICTED HEARING OF LEFT EAR: Primary | ICD-10-CM

## 2024-09-05 DIAGNOSIS — H93.13 TINNITUS, BILATERAL: ICD-10-CM

## 2024-09-05 DIAGNOSIS — H90.A22 SENSORINEURAL HEARING LOSS (SNHL) OF LEFT EAR WITH RESTRICTED HEARING OF RIGHT EAR: ICD-10-CM

## 2024-09-05 DIAGNOSIS — R42 VERTIGO: ICD-10-CM

## 2024-09-05 DIAGNOSIS — H91.93 BILATERAL HEARING LOSS, UNSPECIFIED HEARING LOSS TYPE: Primary | ICD-10-CM

## 2024-09-05 DIAGNOSIS — H90.A21 SENSORINEURAL HEARING LOSS (SNHL) OF RIGHT EAR WITH RESTRICTED HEARING OF LEFT EAR: ICD-10-CM

## 2024-09-05 DIAGNOSIS — H81.01 MENIERE'S DISEASE OF RIGHT EAR: ICD-10-CM

## 2024-09-05 PROCEDURE — 99214 OFFICE O/P EST MOD 30 MIN: CPT | Mod: S$PBB,,, | Performed by: OTOLARYNGOLOGY

## 2024-09-05 PROCEDURE — 92567 TYMPANOMETRY: CPT | Mod: PBBFAC,PN | Performed by: AUDIOLOGIST

## 2024-09-05 PROCEDURE — 99999 PR PBB SHADOW E&M-EST. PATIENT-LVL III: CPT | Mod: PBBFAC,,, | Performed by: OTOLARYNGOLOGY

## 2024-09-05 PROCEDURE — 99213 OFFICE O/P EST LOW 20 MIN: CPT | Mod: PBBFAC,PN | Performed by: OTOLARYNGOLOGY

## 2024-09-05 PROCEDURE — 92557 COMPREHENSIVE HEARING TEST: CPT | Mod: PBBFAC,PN | Performed by: AUDIOLOGIST

## 2024-09-05 RX ORDER — ONDANSETRON 4 MG/1
4 TABLET, ORALLY DISINTEGRATING ORAL EVERY 6 HOURS PRN
Qty: 25 TABLET | Refills: 3 | Status: SHIPPED | OUTPATIENT
Start: 2024-09-05

## 2024-09-05 NOTE — PROGRESS NOTES
Subjective:       Patient ID: Liliana Hermosillo is a 84 y.o. female.    Chief Complaint: Ear Problem (Pt c/o ear fullness, vertigo, nausea, and unable to keep balance)      This patient who I saw in June with a stuffy feeling in her ear and vertigo and an audiogram showed a low-frequency reduction we called him some steroids she is still having problems including nausea although she tells me she does not have the spinning vertigo like she had although she does feel funny like that is turning to the left she is describing a positional issue of some degree.  She is not sure how much her hearing has changed from the audiogram we did in June which showed some asymmetry in the low frequencies but bilateral high-frequency sensorineural loss that was symmetric        Objective:      ENT Physical Exam    Her ear canals and eardrums look fine        Assessment:       1. Bilateral hearing loss, unspecified hearing loss type    2. Possible Meniere's disease of right ear         Plan:          We reviewed her audiogram and that is little or no change from that done in June with the asymmetry of the right ear being reduced .      She is planning to go on a vacation soon even though she is really struggling to get around because of her balance in her nausea I sent in more nausea medicine and I think we need to do an MRI scan of the temporal bone because of the asymmetry that has not responded to steroids and isn't particularly characteristic of Meniere's disease and that is a new onset.

## 2024-09-05 NOTE — PROGRESS NOTES
Liliana Hermosillo was seen 09/05/2024 by Dr. Jaramillo and referred for an audiological evaluation. Pt was accompanied by a friend during today's visit. Pertinent complaints today include decreased hearing in right ear, tinnitus, severe vertigo attacks (which improved after steroid treatment) and nausea. Pt was last seen 6/13/2024 with complaint of decreased right ear hearing and tinnitus.    Results reveal a mild-to-moderately severe mixed hearing loss for the right ear and  normal sloping to severe high frequency  sensorineural hearing loss for the left ear.  This reveals a slight decrease in low frequency air conduction scores compared to audiogram from June 2024.  Speech Reception Thresholds were  40 dBHL for the right ear and 20 dBHL for the left ear.    Word recognition scores were excellent for the right ear and excellent for the left ear.   Tympanograms were Type A for the right ear and Type A for the left ear.    Audiogram results were reviewed in detail with patient and all questions were answered. Results will be reviewed by the referring provider at the completion of this note. All complaints were addressed during this visit to the patient's satisfaction.    Recommendations:  Follow up with ENT  Annual audiogram  Hearing protection in noise  Consider hearing aid consultation

## 2024-09-12 ENCOUNTER — HOSPITAL ENCOUNTER (OUTPATIENT)
Dept: RADIOLOGY | Facility: HOSPITAL | Age: 84
Discharge: HOME | End: 2024-09-12
Attending: OTOLARYNGOLOGY
Payer: MEDICARE

## 2024-09-12 DIAGNOSIS — H90.A21 SENSORINEURAL HEARING LOSS (SNHL) OF RIGHT EAR WITH RESTRICTED HEARING OF LEFT EAR: ICD-10-CM

## 2024-09-12 PROCEDURE — 70553 MRI BRAIN STEM W/O & W/DYE: CPT | Mod: 26,,, | Performed by: RADIOLOGY

## 2024-09-12 PROCEDURE — 25500020 PHARM REV CODE 255: Performed by: OTOLARYNGOLOGY

## 2024-09-12 PROCEDURE — A9585 GADOBUTROL INJECTION: HCPCS | Performed by: OTOLARYNGOLOGY

## 2024-09-12 PROCEDURE — 70553 MRI BRAIN STEM W/O & W/DYE: CPT | Mod: TC

## 2024-09-12 RX ORDER — GADOBUTROL 604.72 MG/ML
6 INJECTION INTRAVENOUS
Status: COMPLETED | OUTPATIENT
Start: 2024-09-12 | End: 2024-09-12

## 2024-09-12 RX ADMIN — GADOBUTROL 6 ML: 604.72 INJECTION INTRAVENOUS at 03:09

## 2024-10-11 ENCOUNTER — TELEPHONE (OUTPATIENT)
Dept: OTOLARYNGOLOGY | Facility: CLINIC | Age: 84
End: 2024-10-11
Payer: MEDICARE

## 2024-10-11 ENCOUNTER — PATIENT MESSAGE (OUTPATIENT)
Dept: FAMILY MEDICINE | Facility: CLINIC | Age: 84
End: 2024-10-11
Payer: MEDICARE

## 2024-10-11 RX ORDER — AZITHROMYCIN 250 MG/1
TABLET, FILM COATED ORAL
Qty: 6 TABLET | Refills: 0 | Status: SHIPPED | OUTPATIENT
Start: 2024-10-11

## 2024-10-11 NOTE — TELEPHONE ENCOUNTER
----- Message from Elias Jaramillo MD sent at 10/11/2024  1:30 PM CDT -----  Regarding: RE: Antibiotic  I sent in some antibiotics for her  ----- Message -----  From: Elvia Herr MA  Sent: 10/11/2024  10:17 AM CDT  To: Elias Jaramillo MD  Subject: Antibiotic                                       Pt requesting an antibiotic to be sent to pharmacy today. She don't want to go into the weekend without starting something.  ----- Message -----  From: Van Israel  Sent: 10/11/2024   9:59 AM CDT  To: Ella Griffin Staff    Type:  Needs Medical Advice    Who Called: Pt    Symptoms (please be specific): bad sore throat/ covid home test neg.       How long has patient had these symptoms:  yesterday morning    Pharmacy name and phone #:    [facility] - [place] - [address]  [address] [place] [address]  Phone: [phone] Fax: [phone]      Would the patient rather a call back or a response via [facility]? Call back    Best Call Back Number: [phone]      Additional Information: Just got back from trip and is starting with a sore throat and would like to get some antibiotics.  Would also like a call back.   Please advise -- Thank you

## 2024-12-18 DIAGNOSIS — Z78.0 MENOPAUSE: ICD-10-CM

## 2024-12-29 ENCOUNTER — PATIENT MESSAGE (OUTPATIENT)
Dept: OTOLARYNGOLOGY | Facility: CLINIC | Age: 84
End: 2024-12-29
Payer: MEDICARE

## 2024-12-30 RX ORDER — DEXAMETHASONE 2 MG/1
TABLET ORAL
Qty: 5 TABLET | Refills: 2 | Status: SHIPPED | OUTPATIENT
Start: 2024-12-30

## 2024-12-31 ENCOUNTER — TELEPHONE (OUTPATIENT)
Dept: OTOLARYNGOLOGY | Facility: CLINIC | Age: 84
End: 2024-12-31
Payer: MEDICARE

## 2024-12-31 NOTE — TELEPHONE ENCOUNTER
Informed pt she can take her meclizine for vertigo per provider and provider will review portal message.

## 2024-12-31 NOTE — TELEPHONE ENCOUNTER
----- Message from Tiffanie sent at 12/31/2024 12:01 PM CST -----  Contact: Patient  Type:  Needs Medical Advice    Who Called:  Patient    Pharmacy name and phone #:        [facility] - [place], [place] - [address]  [address] [place] [address]  Phone: [phone] Fax: [phone]    Would the patient rather a call back or a response via [facility]?  Call back  Best Call Back Number:    [phone]    Additional Information:  States she took the steroid this morning that he prescribed and she's having horrible side effects - states she would like to know if she can take the Vertigo medicine since she took the steroid - please call - thank you

## 2025-01-02 ENCOUNTER — OFFICE VISIT (OUTPATIENT)
Dept: OTOLARYNGOLOGY | Facility: CLINIC | Age: 85
End: 2025-01-02
Payer: MEDICARE

## 2025-01-02 ENCOUNTER — CLINICAL SUPPORT (OUTPATIENT)
Dept: AUDIOLOGY | Facility: CLINIC | Age: 85
End: 2025-01-02
Payer: MEDICARE

## 2025-01-02 VITALS — BODY MASS INDEX: 24.1 KG/M2 | HEIGHT: 63 IN | WEIGHT: 136 LBS

## 2025-01-02 DIAGNOSIS — R42 VERTIGO: ICD-10-CM

## 2025-01-02 DIAGNOSIS — H90.3 ASYMMETRICAL SENSORINEURAL HEARING LOSS: Primary | ICD-10-CM

## 2025-01-02 DIAGNOSIS — H93.11 TINNITUS AURIUM, RIGHT: ICD-10-CM

## 2025-01-02 DIAGNOSIS — R42 DIZZINESS: ICD-10-CM

## 2025-01-02 DIAGNOSIS — H91.93 BILATERAL HEARING LOSS, UNSPECIFIED HEARING LOSS TYPE: Primary | ICD-10-CM

## 2025-01-02 DIAGNOSIS — H81.01 MENIERE'S DISEASE OF RIGHT EAR: ICD-10-CM

## 2025-01-02 PROCEDURE — 99213 OFFICE O/P EST LOW 20 MIN: CPT | Mod: PBBFAC,PN | Performed by: OTOLARYNGOLOGY

## 2025-01-02 PROCEDURE — 92557 COMPREHENSIVE HEARING TEST: CPT | Mod: PBBFAC,PN | Performed by: AUDIOLOGIST

## 2025-01-02 PROCEDURE — 99999 PR PBB SHADOW E&M-EST. PATIENT-LVL III: CPT | Mod: PBBFAC,,, | Performed by: OTOLARYNGOLOGY

## 2025-01-02 PROCEDURE — 92567 TYMPANOMETRY: CPT | Mod: PBBFAC,PN | Performed by: AUDIOLOGIST

## 2025-01-02 NOTE — PROGRESS NOTES
Subjective:       Patient ID: Liliana Hermosillo is a 84 y.o. female.    Chief Complaint: Dizziness (Pt c/o dizziness for a few days. )      This patient continues to have problems of dizziness and reduced hearing in her right ear as a reminder she has been found to have a sudden sensorineural loss she has had an MRI scan and before that she had all sorts of workup for some neurologic symptoms that may has been related to her ear although the hearing loss seems to have been more acute onset and more recent.    As a reminder the MRI scan showed some artifact of the right temporal bone that was probably artifact but they did mentioned CT of temporal bone but she has had so many scans of the head over the years I do not think we should go there if we do not need to and I can not think of that leading to any additional diagnosis or likely a remedy.    That is seems that the prominent stuffy feeling in the right ear and some of this began in January of 2024.  That was my 1st encounter with her and some of this was acute onset and so it was sinus and nasal in character.        Objective:      ENT Physical Exam    Her tympanic membranes and ear canals look normal that is no obvious abnormality.        Assessment:       No diagnosis found.     Plan:          Her audiogram did show some additional reduction in the right sensorineural levels in the deep tones that is sometimes  suggestive of Meniere's.  There are other possible explanations and I am not sure how to factor in the so called artifacts on the MRI scan of temporal bone but I think she is a candidate to see otology in [place] she is having so much trouble with this somewhat progressive problem.

## 2025-01-02 NOTE — PROGRESS NOTES
Liliana Hermosillo was seen 01/02/2025 by Dr. Jaramillo and referred for an audiological evaluation. Pt was alone during today's visit. Pertinent complaints today include right ear tinnitus and hearing loss. Pt states she had vertigo on Sunday and Tuesday of this week. Otoscopy revealed minimal cerumen in both ears. The tympanic membrane was visualized AU prior to proceeding with the hearing testing.     Results reveal a moderate-to-severe sensorineural hearing loss for the right ear and  normal sloping to severe high frequency sensorineural hearing loss for the left ear.    Speech Reception Thresholds were  55 dBHL for the right ear and 25 dBHL for the left ear.    Word recognition scores were poor for the right ear and good for the left ear.   Tympanograms were Type Ad for the right ear and Type A for the left ear.    Audiogram results were reviewed in detail with patient and all questions were answered. Results will be reviewed by the referring provider at the completion of this note. All complaints were addressed during this visit to the patient's satisfaction.    Recommendations:  Follow up with ENT  Repeat audiogram per ENT treatment plan  Hearing protection in noise

## 2025-01-06 ENCOUNTER — PATIENT MESSAGE (OUTPATIENT)
Dept: GASTROENTEROLOGY | Facility: CLINIC | Age: 85
End: 2025-01-06
Payer: MEDICARE

## 2025-01-06 RX ORDER — SUCRALFATE 1 G/1
1 TABLET ORAL
Qty: 120 TABLET | Refills: 3 | Status: SHIPPED | OUTPATIENT
Start: 2025-01-06 | End: 2025-05-06

## 2025-01-08 ENCOUNTER — TELEPHONE (OUTPATIENT)
Dept: OTOLARYNGOLOGY | Facility: CLINIC | Age: 85
End: 2025-01-08

## 2025-01-14 ENCOUNTER — TELEPHONE (OUTPATIENT)
Dept: OTOLARYNGOLOGY | Facility: CLINIC | Age: 85
End: 2025-01-14
Payer: MEDICARE

## 2025-01-14 NOTE — TELEPHONE ENCOUNTER
Called patient to schedule an appointment, appointment scheduled, patient ok with date and time of appointment.

## 2025-01-18 ENCOUNTER — PATIENT MESSAGE (OUTPATIENT)
Dept: FAMILY MEDICINE | Facility: CLINIC | Age: 85
End: 2025-01-18
Payer: MEDICARE

## 2025-02-10 ENCOUNTER — OFFICE VISIT (OUTPATIENT)
Dept: OTOLARYNGOLOGY | Facility: CLINIC | Age: 85
End: 2025-02-10
Payer: MEDICARE

## 2025-02-10 ENCOUNTER — CLINICAL SUPPORT (OUTPATIENT)
Dept: AUDIOLOGY | Facility: CLINIC | Age: 85
End: 2025-02-10
Payer: MEDICARE

## 2025-02-10 DIAGNOSIS — H81.01 MENIERE'S DISEASE OF RIGHT EAR: ICD-10-CM

## 2025-02-10 DIAGNOSIS — H91.93 BILATERAL HEARING LOSS, UNSPECIFIED HEARING LOSS TYPE: ICD-10-CM

## 2025-02-10 DIAGNOSIS — H81.8X1 UNILATERAL VESTIBULAR WEAKNESS, RIGHT: Primary | ICD-10-CM

## 2025-02-10 DIAGNOSIS — R42 DIZZINESS: ICD-10-CM

## 2025-02-10 DIAGNOSIS — H93.13 TINNITUS, BILATERAL: ICD-10-CM

## 2025-02-10 DIAGNOSIS — H90.3 SENSORINEURAL HEARING LOSS (SNHL) OF BOTH EARS: Primary | ICD-10-CM

## 2025-02-10 PROCEDURE — 99999 PR PBB SHADOW E&M-EST. PATIENT-LVL III: CPT | Mod: PBBFAC,,, | Performed by: OTOLARYNGOLOGY

## 2025-02-10 PROCEDURE — 92537 CALORIC VSTBLR TEST W/REC: CPT | Mod: PBBFAC

## 2025-02-10 PROCEDURE — 92540 BASIC VESTIBULAR EVALUATION: CPT | Mod: 26,52,S$PBB,

## 2025-02-10 PROCEDURE — 92557 COMPREHENSIVE HEARING TEST: CPT | Mod: PBBFAC

## 2025-02-10 PROCEDURE — 92537 CALORIC VSTBLR TEST W/REC: CPT | Mod: 26,S$PBB,,

## 2025-02-10 PROCEDURE — 99215 OFFICE O/P EST HI 40 MIN: CPT | Mod: S$PBB,,, | Performed by: OTOLARYNGOLOGY

## 2025-02-10 PROCEDURE — 99213 OFFICE O/P EST LOW 20 MIN: CPT | Mod: PBBFAC,25 | Performed by: OTOLARYNGOLOGY

## 2025-02-10 PROCEDURE — 92540 BASIC VESTIBULAR EVALUATION: CPT | Mod: 52,PBBFAC

## 2025-02-10 PROCEDURE — 92567 TYMPANOMETRY: CPT | Mod: PBBFAC

## 2025-02-10 NOTE — PROGRESS NOTES
VNG EVALUATION    Referring physician:  Dr. Zelaya    History:  Ms. Hermosillo, a 84 y.o. female, was seen today for a VNG. Ms. Hermosillo reported a constant feeling of unsteadiness for several years.  Patient reported that in 2024, she started having severe episodes of vertigo with associated nausea/vomiting.  Patient noted a decrease in hearing sensitivity in her right ear, right aural fullness, worsening tinnitus, and a sensation of being pushed into the ground started around the same time as the vertigo attacks.  Ms. Hermosillo denied headaches/migraines and worsening symptoms with head/body position.  Patient noted feeling anxiety about her balance, which she feels makes it worse.  Patient reported drinking alcohol and taking Zofran within 24 hours of the test.     Results:  Gaze nystagmus was absent.  Oculomotor testing:  Normal latency, normal velocity, and normal accuracy on saccades.  Normal gain on pursuit.  Normal optokinetic gain.  Spontaneous nystagmus was absent    Félix-Hallpike was unable to be performed due to patient's limited mobility of her head and neck    Static positional testing:  Supine head center revealed no nystagmus.  Head right position revealed no nystagmus.  Head left position revealed no nystagmus.    Bilateral bithermal caloric testing:  Unilateral weakness: 28% (right)  Directional preponderance 13% (left-beating)  RW: 14 d/s  LW: 27 d/s  RC: 8 d/s   d/s  Fixation suppression was normal.    Impression: Abnormal VNG; Right unilateral weakness is suggestive of a right peripheral vestibular abnormality.  Unable to rule out BPPV today due to the inability to perform Hallpikes.     Recommendations:   A formal Risk of Falls assessment and formal vestibular/balance rehab  Follow-up with Dr. Zelaya to review the results of today's evaluation

## 2025-02-10 NOTE — PROGRESS NOTES
"Liliana Hermosillo was seen today in the clinic for an audiologic evaluation.  Patient's main complaint was episodes of vertigo that have begun to worsen.  Ms. Hermosillo reported a sensation like a "fist" is in her ear and something is coming out of it. This sensation started just on the right side but recently seems to be going to the left side as well. Ms. Hermosillo experiences bothersome, worsening tinnitus in both ears as well as right sided aural fullness. She denied any other otologic symptoms.     Tympanometry revealed Type A in the right ear and Type A in the left ear.     Audiogram results revealed normal sloping to moderately-severe sensorineural hearing loss in the right ear and a moderate rising to mild sloping to severe sensorineural hearing loss in the left ear.      Speech reception thresholds were noted at 30 dB in the right ear and 25 dB in the left ear.    Speech discrimination scores were 100% in the right ear and 100% in the left ear.    Recommendations:  Otologic evaluation  Hearing aid consultation following medical clearance  Annual audiogram, or sooner if any changes in hearing are noted  Hearing protection when in noise      "

## 2025-02-10 NOTE — PROGRESS NOTES
Subjective     Patient ID: Liliana Hermosillo is a 84 y.o. female.    Chief Complaint: Dizziness    HPI: Pt with chronic imbalance. Neuro W/U neg.    MRI with sig age changes.    Now with occ spells of vertigo with R ear Sx's.    C/O head fullness.    No real HA Hx.    VNG wit R RVR.    Past Medical History: Patient has a past medical history of Atypical ductal hyperplasia, breast, Chronic constipation, Diverticulitis, Diverticulosis, Edema, Hypertension, Mitral valve prolapse, and Ovarian cyst.    Past Surgical History: Patient has a past surgical history that includes Ankle arthroscopy w/ open repair; Knee surgery; Hysterectomy; Cholecystectomy; Breast biopsy (Left); Colonoscopy; Upper gastrointestinal endoscopy; Appendectomy (1972); Eye surgery (2012); Tonsillectomy (1948); Epidural steroid injection into cervical spine (N/A, 3/30/2022); Epidural steroid injection (N/A, 7/6/2022); Injection of nerve (Bilateral, 9/7/2022); Injection of nerve (Bilateral, 10/12/2022); Radiofrequency ablation (Bilateral, 11/9/2022); and Esophagogastroduodenoscopy (N/A, 7/2/2024).    Social History: Patient reports that she quit smoking about 39 years ago. Her smoking use included cigarettes. She started smoking about 65 years ago. She has a 51.8 pack-year smoking history. She has never used smokeless tobacco. She reports current alcohol use of about 5.0 standard drinks of alcohol per week. She reports that she does not use drugs.    Family History: family history includes Anuerysm in her father; Breast cancer in her maternal aunt and sister; Colon cancer in her mother.    Medications:   Current Outpatient Medications   Medication Sig    ASHWAGANDHA ROOT EXTRACT ORAL Take by mouth.    azelastine (OPTIVAR) 0.05 % ophthalmic solution Place 1 drop into both eyes 2 (two) times daily.    clobetasoL (TEMOVATE) 0.05 % cream Apply topically every evening. 6-12 weeks    fluticasone propionate (FLONASE) 50 mcg/actuation nasal spray 2 sprays (100  mcg total) by Each Nostril route every evening. 2 sprays into each nostril before bed every night    hydroCHLOROthiazide (HYDRODIURIL) 25 MG tablet Take 1 tablet (25 mg total) by mouth once daily.    hydrOXYzine pamoate (VISTARIL) 25 MG Cap Take 1 capsule (25 mg total) by mouth every 8 (eight) hours as needed (anxiety).    ibuprofen (ADVIL,MOTRIN) 200 MG tablet Take 400 mg by mouth every 6 (six) hours as needed for Pain.    ipratropium (ATROVENT) 42 mcg (0.06 %) nasal spray 2 sprays by Each Nostril route 4 (four) times daily. Please give 3 (15 ml) bottles for 90 days    ipratropium (ATROVENT) 42 mcg (0.06 %) nasal spray 2 sprays by Each Nostril route 4 (four) times daily. Please give 3 (15 ml) bottles for 90 days    meclizine (ANTIVERT) 25 mg tablet Take 1 tablet (25 mg total) by mouth 3 (three) times daily as needed for Dizziness.    multivitamin (THERAGRAN) per tablet Take 1 tablet by mouth once daily.    NEXLETOL 180 mg Tab Take 1 tablet by mouth.    omeprazole (PRILOSEC) 40 MG capsule Take 1 capsule (40 mg total) by mouth 2 (two) times daily before meals.    ondansetron (ZOFRAN-ODT) 4 MG TbDL Take 1 tablet (4 mg total) by mouth every 6 (six) hours as needed (Nausea).    sucralfate (CARAFATE) 1 gram tablet Take 1 tablet (1 g total) by mouth 4 (four) times daily before meals and nightly.    traZODone (DESYREL) 50 MG tablet Take 1-2 tablets ( mg total) by mouth nightly as needed for Insomnia.    TURMERIC ORAL Take 1 tablet by mouth 2 (two) times a day.    vitamin E 600 UNIT capsule Take 600 Units by mouth once daily.    zolpidem (AMBIEN) 5 MG Tab Take 1 tablet (5 mg total) by mouth nightly as needed (insomnia).     No current facility-administered medications for this visit.       Allergies: Patient is allergic to gabapentin, pantoprazole, and monosodium glutamate.    Review of Systems   Constitutional:  Negative for appetite change, chills, fatigue and fever.   HENT:  Positive for hearing loss and tinnitus.  Negative for nasal congestion, ear discharge, facial swelling, postnasal drip, rhinorrhea, sore throat and trouble swallowing.    Eyes:  Negative for photophobia, pain, discharge, redness, itching and visual disturbance.   Respiratory:  Negative for apnea, cough, choking, chest tightness, shortness of breath, wheezing and stridor.    Cardiovascular:  Negative for chest pain and palpitations.   Gastrointestinal:  Negative for abdominal pain, constipation, diarrhea, nausea and vomiting.   Musculoskeletal:  Negative for arthralgias, gait problem, joint swelling, myalgias, neck pain and neck stiffness.   Integumentary:  Negative for color change, pallor, rash and wound.   Neurological:  Positive for dizziness and light-headedness. Negative for tremors, seizures, syncope, facial asymmetry, speech difficulty, weakness, numbness and headaches.   Hematological:  Negative for adenopathy. Does not bruise/bleed easily.   Psychiatric/Behavioral:  Negative for agitation, behavioral problems, confusion, decreased concentration, dysphoric mood, hallucinations, sleep disturbance and suicidal ideas. The patient is not nervous/anxious and is not hyperactive.           Objective     Physical Exam  Vitals and nursing note reviewed.   Constitutional:       General: She is not in acute distress.     Appearance: Normal appearance. She is well-developed. She is not ill-appearing or diaphoretic.   HENT:      Head: Normocephalic and atraumatic. Not macrocephalic and not microcephalic. No raccoon eyes, Ascencio's sign, abrasion, contusion, right periorbital erythema, left periorbital erythema or laceration. Hair is normal.      Jaw: No trismus.      Right Ear: Tympanic membrane, ear canal and external ear normal. Decreased hearing noted. No laceration, drainage, swelling or tenderness. No middle ear effusion. No foreign body. No mastoid tenderness. No hemotympanum. Tympanic membrane is not injected, scarred, perforated, erythematous, retracted  or bulging. Tympanic membrane has normal mobility.      Left Ear: Tympanic membrane, ear canal and external ear normal. No laceration, drainage, swelling or tenderness.  No middle ear effusion. No foreign body. No mastoid tenderness. No hemotympanum. Tympanic membrane is not injected, scarred, perforated, erythematous, retracted or bulging. Tympanic membrane has normal mobility.      Ears:      Comments:       CN II-XII nl.    EOM's full.    Romberg/ sways. Legs weak.         Nose: Nose normal. No nasal deformity, septal deviation, laceration, mucosal edema or rhinorrhea.      Right Sinus: No maxillary sinus tenderness or frontal sinus tenderness.      Left Sinus: No maxillary sinus tenderness or frontal sinus tenderness.      Mouth/Throat:      Mouth: Mucous membranes are not pale, not dry and not cyanotic. No lacerations or oral lesions.      Dentition: Normal dentition. Does not have dentures. No dental caries or dental abscesses.      Pharynx: Uvula midline. No oropharyngeal exudate, posterior oropharyngeal erythema or uvula swelling.      Tonsils: No tonsillar abscesses.   Eyes:      General: Lids are normal. No scleral icterus.        Right eye: No discharge.         Left eye: No discharge.      Extraocular Movements:      Right eye: Normal extraocular motion and no nystagmus.      Left eye: Normal extraocular motion and no nystagmus.      Conjunctiva/sclera: Conjunctivae normal.      Right eye: Right conjunctiva is not injected. No chemosis, exudate or hemorrhage.     Left eye: Left conjunctiva is not injected. No chemosis, exudate or hemorrhage.     Pupils: Pupils are equal.      Right eye: Pupil is round and reactive.      Left eye: Pupil is round and reactive.   Neck:      Thyroid: No thyroid mass or thyromegaly.      Vascular: Normal carotid pulses. No carotid bruit or hepatojugular reflux.      Trachea: Trachea normal. No tracheal tenderness or tracheal deviation.   Cardiovascular:      Rate and Rhythm:  Normal rate and regular rhythm.      Pulses: Normal pulses.      Heart sounds: Normal heart sounds.   Pulmonary:      Effort: Pulmonary effort is normal. No tachypnea, accessory muscle usage or respiratory distress.      Breath sounds: Normal breath sounds. No stridor.   Abdominal:      Palpations: Abdomen is soft.   Musculoskeletal:      Cervical back: Full passive range of motion without pain, normal range of motion and neck supple. No edema, erythema or rigidity. No muscular tenderness. Normal range of motion.   Lymphadenopathy:      Head:      Right side of head: No submental, submandibular, preauricular or posterior auricular adenopathy.      Left side of head: No submental, submandibular, preauricular or posterior auricular adenopathy.      Cervical: No cervical adenopathy.   Skin:     General: Skin is warm and dry.      Findings: No abrasion or rash.   Neurological:      Mental Status: She is alert.      Cranial Nerves: No cranial nerve deficit.      Sensory: No sensory deficit.      Motor: No tremor, atrophy, abnormal muscle tone or seizure activity.      Coordination: Coordination normal.      Gait: Gait normal.   Psychiatric:         Mood and Affect: Mood is not anxious or depressed. Affect is not labile, angry or inappropriate.         Speech: She is communicative. Speech is not rapid and pressured, delayed, slurred or tangential.         Behavior: Behavior is not agitated, aggressive, withdrawn, hyperactive or combative. Behavior is cooperative.         Thought Content: Thought content is not paranoid or delusional.         Cognition and Memory: Memory is not impaired. She does not exhibit impaired recent memory.         Judgment: Judgment is not impulsive or inappropriate.            Assessment and Plan     1. Bilateral hearing loss, unspecified hearing loss type  -     Ambulatory referral/consult to ENT    2. Dizziness  -     Ambulatory referral/consult to ENT    3. Possible Meniere's disease of right  ear  -     Ambulatory referral/consult to ENT        SERC 8 mg Bid.    F/U prn         No follow-ups on file.

## 2025-02-22 DIAGNOSIS — Z00.00 ENCOUNTER FOR MEDICARE ANNUAL WELLNESS EXAM: ICD-10-CM

## 2025-04-14 RX ORDER — ONDANSETRON 4 MG/1
TABLET, ORALLY DISINTEGRATING ORAL
Qty: 25 TABLET | Refills: 3 | Status: SHIPPED | OUTPATIENT
Start: 2025-04-14

## 2025-04-16 ENCOUNTER — CLINICAL SUPPORT (OUTPATIENT)
Dept: AUDIOLOGY | Facility: CLINIC | Age: 85
End: 2025-04-16
Payer: MEDICARE

## 2025-04-16 ENCOUNTER — OFFICE VISIT (OUTPATIENT)
Dept: OTOLARYNGOLOGY | Facility: CLINIC | Age: 85
End: 2025-04-16
Payer: MEDICARE

## 2025-04-16 VITALS — HEIGHT: 63 IN | WEIGHT: 136 LBS | BODY MASS INDEX: 24.1 KG/M2

## 2025-04-16 DIAGNOSIS — R42 VERTIGO: ICD-10-CM

## 2025-04-16 DIAGNOSIS — H90.3 SENSORINEURAL HEARING LOSS (SNHL), BILATERAL: Primary | ICD-10-CM

## 2025-04-16 DIAGNOSIS — H90.A31 MIXED CONDUCTIVE AND SENSORINEURAL HEARING LOSS OF RIGHT EAR WITH RESTRICTED HEARING OF LEFT EAR: ICD-10-CM

## 2025-04-16 DIAGNOSIS — H93.13 TINNITUS AURIUM, BILATERAL: ICD-10-CM

## 2025-04-16 DIAGNOSIS — H90.A21 SENSORINEURAL HEARING LOSS (SNHL) OF RIGHT EAR WITH RESTRICTED HEARING OF LEFT EAR: Primary | ICD-10-CM

## 2025-04-16 PROCEDURE — 99213 OFFICE O/P EST LOW 20 MIN: CPT | Mod: PBBFAC,PN | Performed by: OTOLARYNGOLOGY

## 2025-04-16 PROCEDURE — 99999 PR PBB SHADOW E&M-EST. PATIENT-LVL III: CPT | Mod: PBBFAC,,, | Performed by: OTOLARYNGOLOGY

## 2025-04-16 PROCEDURE — 92557 COMPREHENSIVE HEARING TEST: CPT | Mod: PBBFAC,PN | Performed by: AUDIOLOGIST

## 2025-04-16 PROCEDURE — 92567 TYMPANOMETRY: CPT | Mod: PBBFAC,PN | Performed by: AUDIOLOGIST

## 2025-04-16 PROCEDURE — 99214 OFFICE O/P EST MOD 30 MIN: CPT | Mod: S$PBB,,, | Performed by: OTOLARYNGOLOGY

## 2025-04-16 RX ORDER — PREDNISONE 20 MG/1
TABLET ORAL
Qty: 14 TABLET | Refills: 0 | Status: SHIPPED | OUTPATIENT
Start: 2025-04-16

## 2025-04-16 NOTE — PROGRESS NOTES
Subjective:       Patient ID: Liliana Hermosillo is a 84 y.o. female.    Chief Complaint: Hearing Loss (Patient comes in today with c/o sudden hearing loss. She said it started last Friday and she was able to gain some hearing back over the weekend. She had audiogram done today. )        This patient presents todays because Friday, which was for days ago, she had a sudden loss of hearing in her good ear, her left ear, she had also concurrent dizziness.  she tells me it came back in two days but that it is not quite like it was.    We obtained an audiogram today and relative to the one done in January her good ear, her left ear, does have reduction in the low frequencies just below the range of normal.  Right-sided hearing loss and you had an MRI scan that I will post the summary findings of below for convenience  1. No acute intracranial abnormality.  2. Focal signal defects within the right-sided lateral and posterior semicircular canals on heavily T2 weighted balance FFE series without obvious filling defects on the axial T2 weighted sequence.  No abnormal enhancement.  While the findings are favored to represent artifact, underlying chronic semicircular canal disease from underlying fibrosis or abnormal ossification cannot be entirely excluded.  No other discrete lesion in the posterior fossa, internal auditory canals, or temporal bones.  Further evaluation with temporal bone CT could be considered.  3. Mild generalized cerebral volume loss and scattered T2/FLAIR hyperintense signal within the supratentorial white matter, nonspecific but probably reflecting sequelae of chronic small vessel ischemic change.          Objective:      ENT Physical Exam    Her tympanic membranes and ear canals are normal we reviewed her audiogram and compared it to the one in January.        Assessment:       1. Sensorineural hearing loss (SNHL) of right ear with restricted hearing of left ear    2. Mixed conductive and sensorineural  hearing loss of right ear with restricted hearing of left ear         Plan:          So with a sudden change in her good ear, her left ear even though it returned after couple of days I wanted to give her some prednisone see her back in a week and repeat her audiogram.  The overall story is uncommon additionally I wanted to follow up on the MRI scan with a CT scan that was recommended by Radiology which I have ordered

## 2025-04-16 NOTE — PROGRESS NOTES
"Liliana Hermosillo was seen 04/16/2025 by Dr. Jaramillo and for an audiological evaluation. Pt was alone during today's visit. Pertinent complaints today include hearing loss, tinnitus and dizziness. 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 Pt had VNG 2/10/2025 which found unilateral vestibular weakness for the right ear.  Audiogram completed that same day showed improved hearing levels in the right ear from audiogram on 1/2/2025. Pt states today she "does not hear" out of right ear and her left ear hearing decreased over this past weekend. She says she woke up Monday and most of her hearing had returned in her left ear. Pt was told she has wax build up in the left ear and wants it removed. Otoscopy revealed very minimal cerumen in both ears. The tympanic membrane was visualized AU prior to proceeding with the hearing testing.     Results reveal a moderate-to-moderately severe sensorineural hearing loss for the right ear and  mild-to-moderately severe sensorineural hearing loss for the left ear.    Speech Reception Thresholds were  65 dBHL for the right ear and 30 dBHL for the left ear.    Word recognition scores were very poor (44%) for the right ear  and excellent (92%) for the left ear.   Tympanograms were Type A for the right ear and Type A for the left ear.    Audiogram results were reviewed in detail with patient and all questions were answered. Results will be reviewed by the referring provider at the completion of this note. All complaints were addressed during this visit to the patient's satisfaction.    Recommendations:  Follow up with ENT  Repeat audiogram per ENT treatment plan  Hearing protection in noise      "

## 2025-04-21 ENCOUNTER — HOSPITAL ENCOUNTER (OUTPATIENT)
Dept: RADIOLOGY | Facility: HOSPITAL | Age: 85
Discharge: HOME | End: 2025-04-21
Attending: OTOLARYNGOLOGY
Payer: MEDICARE

## 2025-04-21 DIAGNOSIS — H90.A21 SENSORINEURAL HEARING LOSS (SNHL) OF RIGHT EAR WITH RESTRICTED HEARING OF LEFT EAR: ICD-10-CM

## 2025-04-21 DIAGNOSIS — H90.A31 MIXED CONDUCTIVE AND SENSORINEURAL HEARING LOSS OF RIGHT EAR WITH RESTRICTED HEARING OF LEFT EAR: ICD-10-CM

## 2025-04-21 PROCEDURE — 70480 CT ORBIT/EAR/FOSSA W/O DYE: CPT | Mod: TC

## 2025-04-21 PROCEDURE — 70480 CT ORBIT/EAR/FOSSA W/O DYE: CPT | Mod: 26,,, | Performed by: RADIOLOGY

## 2025-04-23 ENCOUNTER — CLINICAL SUPPORT (OUTPATIENT)
Dept: AUDIOLOGY | Facility: CLINIC | Age: 85
End: 2025-04-23
Payer: MEDICARE

## 2025-04-23 ENCOUNTER — OFFICE VISIT (OUTPATIENT)
Dept: OTOLARYNGOLOGY | Facility: CLINIC | Age: 85
End: 2025-04-23
Payer: MEDICARE

## 2025-04-23 VITALS — WEIGHT: 136 LBS | HEIGHT: 63 IN | BODY MASS INDEX: 24.1 KG/M2

## 2025-04-23 DIAGNOSIS — H93.13 TINNITUS AURIUM, BILATERAL: ICD-10-CM

## 2025-04-23 DIAGNOSIS — H90.3 SENSORINEURAL HEARING LOSS (SNHL), BILATERAL: Primary | ICD-10-CM

## 2025-04-23 DIAGNOSIS — H81.01 MENIERE'S DISEASE OF RIGHT EAR: Primary | ICD-10-CM

## 2025-04-23 DIAGNOSIS — R42 DIZZINESS: ICD-10-CM

## 2025-04-23 DIAGNOSIS — H10.13 ALLERGIC CONJUNCTIVITIS OF BOTH EYES: ICD-10-CM

## 2025-04-23 PROCEDURE — 99213 OFFICE O/P EST LOW 20 MIN: CPT | Mod: PBBFAC,PN | Performed by: OTOLARYNGOLOGY

## 2025-04-23 PROCEDURE — 92557 COMPREHENSIVE HEARING TEST: CPT | Mod: PBBFAC,PN | Performed by: AUDIOLOGIST

## 2025-04-23 PROCEDURE — 92567 TYMPANOMETRY: CPT | Mod: PBBFAC,PN | Performed by: AUDIOLOGIST

## 2025-04-23 PROCEDURE — 99214 OFFICE O/P EST MOD 30 MIN: CPT | Mod: S$PBB,,, | Performed by: OTOLARYNGOLOGY

## 2025-04-23 PROCEDURE — 99999 PR PBB SHADOW E&M-EST. PATIENT-LVL III: CPT | Mod: PBBFAC,,, | Performed by: OTOLARYNGOLOGY

## 2025-04-23 RX ORDER — AZELASTINE HYDROCHLORIDE 0.5 MG/ML
1 SOLUTION/ DROPS OPHTHALMIC 2 TIMES DAILY
Qty: 6 ML | Refills: 11 | Status: SHIPPED | OUTPATIENT
Start: 2025-04-23 | End: 2026-04-23

## 2025-04-23 NOTE — PROGRESS NOTES
Liliana Hermosillo was seen 04/23/2025 by Dr. Jaramillo and for repeat audiological evaluation after medical treatment for vertigo and hearing loss. Pt was alone during today's visit. Pertinent complaints today include hearing loss, nausea, dizziness.  Otoscopy revealed minimal cerumen in both ears. The tympanic membrane was visualized AU prior to proceeding with the hearing testing.     Results reveal a moderate-to-severe sensorineural hearing loss for the right ear and  normal-to-moderately severe sensorineural hearing loss for the left ear.    Speech Reception Thresholds were  45 dBHL for the right ear and 20 dBHL for the left ear.    Word recognition scores were fair (72%) for the right ear and excellent (92%) for the left ear.   Tympanograms were Type A for the right ear and Type A for the left ear.    Audiogram results were reviewed in detail with patient and all questions were answered. Results will be reviewed by the referring provider at the completion of this note. All complaints were addressed during this visit to the patient's satisfaction.    Recommendations:  Follow up with ENT  Repeat audiogram per ENT treatment plan  Hearing protection in noise

## 2025-04-23 NOTE — PROGRESS NOTES
Subjective:       Patient ID: Liliana Hermosillo is a 84 y.o. female.    Chief Complaint: Hearing Loss (Patient is here following up on hearing loss and had a repeat hearing test today)      She has history that is probably consistent with Meniere's syndrome of the right ear and then more recently had for the 1st time a significant reduction in her left ear by the time I saw her she tells me it had returned to a great degree without any intervention an audiogram did show a slight reduction in the low and mid tones and while not dramatic was clearly different than her previous audiogram which mostly should problems in the right ear.  We treated with some steroids and she came back today for a follow up audiogram which shows continued improvement in her left ear not quite back to what is her known baseline.  She very much dislikes the way she feels on steroids.  She is also on hydrochlorothiazide daily for edema I have encouraged her to take that daily in case this isn't Meniere's syndrome which is certainly possible and especially with there being a bilateral aspect to it now is more likely than simply a sudden loss on the right which was one of our possible possibilities     And as a reminder she has had an MRI scan that showed something a little funny on the temporal bone on the right but since then a CT of the head as clarified nothing abnormal as far as inner ear anatomy is concerned          Objective:      ENT Physical Exam    Her tympanic membranes and ear canals are normal        Assessment:       1. Possible Meniere's disease of right ear    2. Allergic conjunctivitis of both eyes         Plan:          We reviewed her CT because she read the results in it mentioned some sinus and nasal issues but she does not have any sinus or nasal abnormalities with the exception of a leftward septal bone spur that I discuss with her but she does not have that much in the way of sinus or nasal complaints that would be linked  to that     She also wondered if I can send in some types of eyedrops she has tried different things from her eye doctor and her daughter isn't eye doctor who is sends her some drops now and then that she is told only use rarely I am not sure what those might represent but I wanted to send in just some antihistamine eyedrops which are safe for routine use if she finds them helpful she certainly has a lot of redness of the conjunctiva and if she does not benefit from antihistamine eyedrops perhaps she has ocular rosacea since she already sees eye doctors and has one in her family I do not think that is best for me to involved myself too deeply in that complete unless she requests me to.    So for now I think she is continue on the diuretic if things go well and do not change much recheck her hearing this summer watch her salt intake and report quickly to me if she has a notable change in her hearing especially the left ear which is her good ear

## 2025-04-24 ENCOUNTER — HOSPITAL ENCOUNTER (EMERGENCY)
Facility: HOSPITAL | Age: 85
Discharge: HOME | End: 2025-04-24
Attending: EMERGENCY MEDICINE
Payer: MEDICARE

## 2025-04-24 VITALS
TEMPERATURE: 99 F | SYSTOLIC BLOOD PRESSURE: 158 MMHG | DIASTOLIC BLOOD PRESSURE: 60 MMHG | RESPIRATION RATE: 16 BRPM | HEIGHT: 63 IN | BODY MASS INDEX: 24.63 KG/M2 | OXYGEN SATURATION: 94 % | HEART RATE: 82 BPM | WEIGHT: 139 LBS

## 2025-04-24 DIAGNOSIS — N39.0 URINARY TRACT INFECTION WITHOUT HEMATURIA, SITE UNSPECIFIED: Primary | ICD-10-CM

## 2025-04-24 DIAGNOSIS — R53.1 WEAKNESS: ICD-10-CM

## 2025-04-24 LAB
ABSOLUTE EOSINOPHIL (OHS): 0.12 K/UL
ABSOLUTE MONOCYTE (OHS): 0.6 K/UL (ref 0.3–1)
ABSOLUTE NEUTROPHIL COUNT (OHS): 4.54 K/UL (ref 1.8–7.7)
ALBUMIN SERPL BCP-MCNC: 3.8 G/DL (ref 3.5–5.2)
ALP SERPL-CCNC: 38 UNIT/L (ref 40–150)
ALT SERPL W/O P-5'-P-CCNC: 18 UNIT/L (ref 10–44)
ANION GAP (OHS): 11 MMOL/L (ref 8–16)
AST SERPL-CCNC: 19 UNIT/L (ref 11–45)
BACTERIA #/AREA URNS HPF: ABNORMAL /HPF
BASOPHILS # BLD AUTO: 0.02 K/UL
BASOPHILS NFR BLD AUTO: 0.3 %
BILIRUB SERPL-MCNC: 0.6 MG/DL (ref 0.1–1)
BILIRUB UR QL STRIP.AUTO: NEGATIVE
BUN SERPL-MCNC: 29 MG/DL (ref 8–23)
CALCIUM SERPL-MCNC: 9.4 MG/DL (ref 8.7–10.5)
CHLORIDE SERPL-SCNC: 99 MMOL/L (ref 95–110)
CLARITY UR: ABNORMAL
CO2 SERPL-SCNC: 31 MMOL/L (ref 23–29)
COLOR UR AUTO: YELLOW
CREAT SERPL-MCNC: 1.4 MG/DL (ref 0.5–1.4)
ERYTHROCYTE [DISTWIDTH] IN BLOOD BY AUTOMATED COUNT: 12.5 % (ref 11.5–14.5)
GFR SERPLBLD CREATININE-BSD FMLA CKD-EPI: 38 ML/MIN/1.73/M2
GLUCOSE SERPL-MCNC: 97 MG/DL (ref 70–110)
GLUCOSE UR QL STRIP: NEGATIVE
HCT VFR BLD AUTO: 42.3 % (ref 37–48.5)
HGB BLD-MCNC: 13.9 GM/DL (ref 12–16)
HGB UR QL STRIP: NEGATIVE
HOLD SPECIMEN: NORMAL
HYALINE CASTS #/AREA URNS LPF: 0 /LPF (ref 0–1)
IMM GRANULOCYTES # BLD AUTO: 0.06 K/UL (ref 0–0.04)
IMM GRANULOCYTES NFR BLD AUTO: 0.8 % (ref 0–0.5)
KETONES UR QL STRIP: NEGATIVE
LEUKOCYTE ESTERASE UR QL STRIP: ABNORMAL
LYMPHOCYTES # BLD AUTO: 2.05 K/UL (ref 1–4.8)
MAGNESIUM SERPL-MCNC: 1.9 MG/DL (ref 1.6–2.6)
MCH RBC QN AUTO: 28.6 PG (ref 27–31)
MCHC RBC AUTO-ENTMCNC: 32.9 G/DL (ref 32–36)
MCV RBC AUTO: 87 FL (ref 82–98)
MICROSCOPIC COMMENT: ABNORMAL
NITRITE UR QL STRIP: NEGATIVE
NUCLEATED RBC (/100WBC) (OHS): 0 /100 WBC
PH UR STRIP: 7 [PH]
PLATELET # BLD AUTO: 213 K/UL (ref 150–450)
PMV BLD AUTO: 10.5 FL (ref 9.2–12.9)
POCT GLUCOSE: 104 MG/DL (ref 70–110)
POTASSIUM SERPL-SCNC: 3.4 MMOL/L (ref 3.5–5.1)
PROT SERPL-MCNC: 6.6 GM/DL (ref 6–8.4)
PROT UR QL STRIP: ABNORMAL
RBC # BLD AUTO: 4.86 M/UL (ref 4–5.4)
RBC #/AREA URNS HPF: 3 /HPF (ref 0–4)
RELATIVE EOSINOPHIL (OHS): 1.6 %
RELATIVE LYMPHOCYTE (OHS): 27.7 % (ref 18–48)
RELATIVE MONOCYTE (OHS): 8.1 % (ref 4–15)
RELATIVE NEUTROPHIL (OHS): 61.5 % (ref 38–73)
SODIUM SERPL-SCNC: 141 MMOL/L (ref 136–145)
SP GR UR STRIP: 1.01
SQUAMOUS #/AREA URNS HPF: 6 /HPF
TSH SERPL-ACNC: 1.98 UIU/ML (ref 0.4–4)
UROBILINOGEN UR STRIP-ACNC: 1 EU/DL
WBC # BLD AUTO: 7.39 K/UL (ref 3.9–12.7)
WBC #/AREA URNS HPF: 70 /HPF (ref 0–5)

## 2025-04-24 PROCEDURE — 82962 GLUCOSE BLOOD TEST: CPT

## 2025-04-24 PROCEDURE — 63600175 PHARM REV CODE 636 W HCPCS: Performed by: EMERGENCY MEDICINE

## 2025-04-24 PROCEDURE — 81003 URINALYSIS AUTO W/O SCOPE: CPT | Performed by: EMERGENCY MEDICINE

## 2025-04-24 PROCEDURE — 93005 ELECTROCARDIOGRAM TRACING: CPT | Performed by: INTERNAL MEDICINE

## 2025-04-24 PROCEDURE — 96361 HYDRATE IV INFUSION ADD-ON: CPT

## 2025-04-24 PROCEDURE — 83735 ASSAY OF MAGNESIUM: CPT | Performed by: EMERGENCY MEDICINE

## 2025-04-24 PROCEDURE — 71045 X-RAY EXAM CHEST 1 VIEW: CPT | Mod: 26,,, | Performed by: RADIOLOGY

## 2025-04-24 PROCEDURE — 99285 EMERGENCY DEPT VISIT HI MDM: CPT | Mod: 25

## 2025-04-24 PROCEDURE — 93010 ELECTROCARDIOGRAM REPORT: CPT | Mod: ,,, | Performed by: INTERNAL MEDICINE

## 2025-04-24 PROCEDURE — 87186 SC STD MICRODIL/AGAR DIL: CPT | Performed by: EMERGENCY MEDICINE

## 2025-04-24 PROCEDURE — 82040 ASSAY OF SERUM ALBUMIN: CPT | Performed by: EMERGENCY MEDICINE

## 2025-04-24 PROCEDURE — 85025 COMPLETE CBC W/AUTO DIFF WBC: CPT | Performed by: EMERGENCY MEDICINE

## 2025-04-24 PROCEDURE — 84443 ASSAY THYROID STIM HORMONE: CPT | Performed by: EMERGENCY MEDICINE

## 2025-04-24 PROCEDURE — 96374 THER/PROPH/DIAG INJ IV PUSH: CPT

## 2025-04-24 PROCEDURE — 71045 X-RAY EXAM CHEST 1 VIEW: CPT | Mod: TC

## 2025-04-24 RX ORDER — CEPHALEXIN 250 MG/1
250 CAPSULE ORAL EVERY 8 HOURS
Qty: 21 CAPSULE | Refills: 0 | Status: SHIPPED | OUTPATIENT
Start: 2025-04-24 | End: 2025-05-01

## 2025-04-24 RX ORDER — CEFTRIAXONE 1 G/1
1 INJECTION, POWDER, FOR SOLUTION INTRAMUSCULAR; INTRAVENOUS
Status: COMPLETED | OUTPATIENT
Start: 2025-04-24 | End: 2025-04-24

## 2025-04-24 RX ADMIN — CEFTRIAXONE SODIUM 1 G: 1 INJECTION, POWDER, FOR SOLUTION INTRAMUSCULAR; INTRAVENOUS at 06:04

## 2025-04-24 RX ADMIN — SODIUM CHLORIDE, POTASSIUM CHLORIDE, SODIUM LACTATE AND CALCIUM CHLORIDE 1000 ML: 600; 310; 30; 20 INJECTION, SOLUTION INTRAVENOUS at 03:04

## 2025-04-24 NOTE — ED PROVIDER NOTES
Encounter Date: 4/24/2025       History     Chief Complaint   Patient presents with    Headache    Leg Pain     C/o headache and bilat leg pain on and off for years that is usually better after she eats but today it didn't get better after eating.      Pleasant well-developed 84-year-old female who looks younger than stated age comes emergency complaints of generalized weakness and headache with bilateral leg pain off and on.  She did feel better after she ate some food.  The leg pain and leg weakness this has been present for the last 4 years or more.  Nothing acute on this in.  She appears stable and in no distress.  EMS who dropped her off states that her vital signs were stable at home.  Past medical history of a atypical ductal hyperplasia of the breast, ovarian cyst mitral valve prolapse hypertension edema diverticulosis diverticulitis and chronic pain.      Review of patient's allergies indicates:   Allergen Reactions    Gabapentin Swelling     Swelling of face    Pantoprazole     Monosodium glutamate Palpitations and Rash     Past Medical History:   Diagnosis Date    Atypical ductal hyperplasia, breast     Chronic constipation     Diverticulitis     Diverticulosis     Edema     Hypertension     Mitral valve prolapse     Ovarian cyst      Past Surgical History:   Procedure Laterality Date    ANKLE ARTHROSCOPY W/ OPEN REPAIR      APPENDECTOMY  1972    BREAST BIOPSY Left     CHOLECYSTECTOMY      COLONOSCOPY      EPIDURAL STEROID INJECTION N/A 7/6/2022    Procedure: Injection, Steroid, Epidural NAIDA C7-T1;  Surgeon: Kobe Rodríguez MD;  Location: [facility] OR;  Service: Pain Management;  Laterality: N/A;  Medicare    EPIDURAL STEROID INJECTION INTO CERVICAL SPINE N/A 3/30/2022    Procedure: Injection-steroid-epidural-cervical;  Surgeon: Kobe Rodríguez MD;  Location: [facility] OR;  Service: Pain Management;  Laterality: N/A;  c7-t1    ESOPHAGOGASTRODUODENOSCOPY N/A 7/2/2024    Procedure: EGD  (ESOPHAGOGASTRODUODENOSCOPY);  Surgeon: Indio Galvez MD;  Location: [facility];  Service: Endoscopy;  Laterality: N/A;    EYE SURGERY  2012    HYSTERECTOMY      INJECTION OF NERVE Bilateral 9/7/2022    Procedure: INJECTION, NERVE;  Surgeon: Kobe Rodríguez MD;  Location: [facility] OR;  Service: Anesthesiology;  Laterality: Bilateral;  C2,3 Mbb and TON block    INJECTION OF NERVE Bilateral 10/12/2022    Procedure: INJECTION, NERVE;  Surgeon: Kobe Rodríguez MD;  Location: [facility] OR;  Service: Anesthesiology;  Laterality: Bilateral;  C2, C3 and TON  MBB    KNEE SURGERY      RADIOFREQUENCY ABLATION Bilateral 11/9/2022    Procedure: Radiofrequency Ablation C2-C3 and TON;  Surgeon: Kobe Rodríguez MD;  Location: [facility] OR;  Service: Pain Management;  Laterality: Bilateral;    TONSILLECTOMY  1948    UPPER GASTROINTESTINAL ENDOSCOPY       Family History   Problem Relation Name Age of Onset    Colon cancer Mother      Anuerysm Father      Breast cancer Sister Lucinda Mangeot     Breast cancer Maternal Aunt Alyce     Collagen disease Neg Hx      Ovarian cancer Neg Hx       Social History[1]      Physical Exam     Initial Vitals [04/24/25 1519]   BP Pulse Resp Temp SpO2   (!) 156/69 70 17 99.1 °F (37.3 °C) 96 %      MAP       --         Physical Exam    Nursing note and vitals reviewed.  Constitutional: She appears well-developed and well-nourished.   HENT:   Head: Normocephalic and atraumatic.   Eyes: Pupils are equal, round, and reactive to light.   Neck:   Normal range of motion.  Cardiovascular:  Normal rate, regular rhythm and normal heart sounds.           Pulmonary/Chest: Breath sounds normal.   Abdominal: Abdomen is soft. Bowel sounds are normal.   Musculoskeletal:         General: Normal range of motion.      Cervical back: Normal range of motion.     Neurological: She is alert and oriented to person, place, and time. She has normal strength. GCS score is 15. GCS eye subscore is 4. GCS verbal subscore is 5. GCS motor  subscore is 6.   Skin: Skin is warm and dry.   Psychiatric: She has a normal mood and affect. Her behavior is normal. Judgment and thought content normal.         ED Course   Procedures  Labs Reviewed   COMPREHENSIVE METABOLIC PANEL - Abnormal       Result Value    Sodium 141      Potassium 3.4 (*)     Chloride 99      CO2 31 (*)     Glucose 97      BUN 29 (*)     Creatinine 1.4      Calcium 9.4      Protein Total 6.6      Albumin 3.8      Bilirubin Total 0.6      ALP 38 (*)     AST 19      ALT 18      Anion Gap 11      eGFR 38 (*)    URINALYSIS, REFLEX TO URINE CULTURE - Abnormal    Color, UA Yellow      Appearance, UA Hazy (*)     pH, UA 7.0      Spec Grav UA 1.015      Protein, UA 1+ (*)     Glucose, UA Negative      Ketones, UA Negative      Bilirubin, UA Negative      Blood, UA Negative      Nitrites, UA Negative      Urobilinogen, UA 1.0      Leukocyte Esterase, UA 1+ (*)    CBC WITH DIFFERENTIAL - Abnormal    WBC 7.39      RBC 4.86      HGB 13.9      HCT 42.3      MCV 87      MCH 28.6      MCHC 32.9      RDW 12.5      Platelet Count 213      MPV 10.5      Nucleated RBC 0      Neut % 61.5      Lymph % 27.7      Mono % 8.1      Eos % 1.6      Basophil % 0.3      Imm Grans % 0.8 (*)     Neut # 4.54      Lymph # 2.05      Mono # 0.60      Eos # 0.12      Baso # 0.02      Imm Grans # 0.06 (*)    URINALYSIS MICROSCOPIC - Abnormal    RBC, UA 3      WBC, UA 70 (*)     Bacteria, UA Moderate (*)     Squamous Epithelial Cells, UA 6      Hyaline Casts, UA 0      Microscopic Comment       TSH - Normal    TSH 1.978     MAGNESIUM - Normal    Magnesium  1.9     CULTURE, URINE   CBC W/ AUTO DIFFERENTIAL    Narrative:     The following orders were created for panel order CBC auto differential.  Procedure                               Abnormality         Status                     ---------                               -----------         ------                     CBC with Differential[[id number]]       Abnormal             Final result                 Please view results for these tests on the individual orders.   GREY TOP URINE HOLD    Extra Tube Hold for add-ons.     POCT GLUCOSE    POCT Glucose 104     POCT GLUCOSE MONITORING CONTINUOUS     EKG Readings: (Independently Interpreted)   Rate 69 per minute.  Normal axis, right bundle-branch block, QTC of 480 milliseconds.  Abnormal EKG.       Imaging Results              X-Ray Chest AP Portable (Final result)  Result time 04/24/25 15:40:39      Final result by Win Nevarez MD (04/24/25 15:40:39)                   Impression:      No acute chest disease.      Electronically signed by: Win Nevarez  Date:    04/24/2025  Time:    15:40               Narrative:    EXAMINATION:  XR CHEST AP PORTABLE    CLINICAL HISTORY:  Chest Pain;    TECHNIQUE:  Portable view of the chest was performed.    COMPARISON:  04/23/2023.    FINDINGS:  Lungs are clear.  No focal consolidation.  Heart size normal.  Loop recorder in place.  Mediastinal contours unremarkable.  Trachea midline.    Bony thorax intact.                                    X-Rays:   Independently Interpreted Readings:   Other Readings:  Impression:  No acute chest disease.      Electronically signed by: Win Nevarez  Date: 04/24/2025  Time: 15:40       Medications   lactated ringers bolus 1,000 mL (0 mLs Intravenous Stopped 4/24/25 1636)   cefTRIAXone injection 1 g (1 g Intravenous Given 4/24/25 1806)     Medical Decision Making  Anemia, electrolyte abnormality, infection, sepsis, dehydration, urinate check infection, cancerous process, viral infection, hereditary hypokalemia syndrome.  CVA, TIA, hemorrhagic stroke, embolic stroke.    Dr. Morgan:  Patient care assumed at shift change.  Patient had presented complaining of chronic bilateral leg pain which she states seems to be connected to eating.  Also complains of occasional nausea, which seems to be relieved by food.  She had an appointment to see a gastroenterologist but  missed the appointment.  She has been rescheduled.  She states she uses Pepcid only as needed.  Her labs are unremarkable except for a very mildly low potassium, and signs of urinary tract infection.  Will start her on Keflex, and I have recommended that she take the Pepcid daily, and follow a bland diet until she sees the gastroenterologist.  I did discuss admission with the patient but she does not believe that she needs admission and would like to be discharged home.  I agree with this.  She will follow-up with her PCP and her gastroenterologist as we discussed, and return here as needed or if worse in any way.    Amount and/or Complexity of Data Reviewed  Labs: ordered.  Radiology: ordered.  Discussion of management or test interpretation with external provider(s): Patient looks relatively stable at this time.  No acute distress.  Awaiting laboratory values.  EKG is significant for right bundle-branch block.  But no acute abnormality.    Chest x-ray negative for acute abnormality.    Laboratory values do indeed demonstrate a urinary tract infection.  I suspect this is the etiology of the patient's weakness.  I am going to have this patient admitted to the hospitalist service.  I will discuss this with the oncoming physician to call the night physician and have this patient admitted.    Risk  Prescription drug management.                                      Clinical Impression:  Final diagnoses:  [R53.1] Weakness  [N39.0] Urinary tract infection without hematuria, site unspecified (Primary)          ED Disposition Condition    Discharge Good          ED Prescriptions       Medication Sig Dispense Start Date End Date Auth. Provider    cephALEXin (KEFLEX) 250 MG capsule Take 1 capsule (250 mg total) by mouth every 8 (eight) hours. for 7 days 21 capsule 4/24/2025 5/1/2025 Juan Morgan MD          Follow-up Information       Follow up With Specialties Details Why Contact Info    Keara Matias MD Family  Medicine Call in 1 day  [address]  [address]  [place] [address]  [phone]      [facility] Emergency Dept Emergency Medicine  If symptoms worsen [address] [address] [phone]               Juan Morgan MD  25         [1]   Social History  Tobacco Use    Smoking status: Former     Current packs/day: 0.00     Average packs/day: 2.0 packs/day for 25.9 years (51.8 ttl pk-yrs)     Types: Cigarettes     Start date: 1960     Quit date: 1985     Years since quittin.4    Smokeless tobacco: Never   Substance Use Topics    Alcohol use: Yes     Alcohol/week: 5.0 standard drinks of alcohol     Types: 4 Glasses of wine, 1 Shots of liquor per week    Drug use: No        Juan Morgan MD  25

## 2025-04-25 ENCOUNTER — TELEPHONE (OUTPATIENT)
Dept: FAMILY MEDICINE | Facility: CLINIC | Age: 85
End: 2025-04-25
Payer: MEDICARE

## 2025-04-25 LAB
OHS QRS DURATION: 122 MS
OHS QTC CALCULATION: 480 MS

## 2025-04-25 NOTE — DISCHARGE INSTRUCTIONS
As we discussed, take Keflex for urinary tract infection.  Follow a bland diet, and continue with the daily over-the-counter antacid medicine such as Pepcid.  Follow-up with your primary care provider and your gastroenterologist as well.  Return here as needed or if worse.

## 2025-04-25 NOTE — TELEPHONE ENCOUNTER
----- Message from Molly sent at 4/24/2025  8:58 PM CDT -----  Regarding: ED Discharge Appointment  ED Discharge appointment. Confirmed call back number #[phone]. Appointment requested for issues with UTI. Provider recommending appointment ASAP. Thanks

## 2025-04-29 LAB
BACTERIA UR CULT: ABNORMAL
BACTERIA UR CULT: ABNORMAL

## 2025-05-01 ENCOUNTER — OFFICE VISIT (OUTPATIENT)
Dept: FAMILY MEDICINE | Facility: CLINIC | Age: 85
End: 2025-05-01
Payer: MEDICARE

## 2025-05-01 VITALS
HEIGHT: 63 IN | SYSTOLIC BLOOD PRESSURE: 108 MMHG | DIASTOLIC BLOOD PRESSURE: 60 MMHG | BODY MASS INDEX: 24.92 KG/M2 | HEART RATE: 97 BPM | WEIGHT: 140.63 LBS | OXYGEN SATURATION: 97 % | RESPIRATION RATE: 16 BRPM

## 2025-05-01 DIAGNOSIS — N18.31 STAGE 3A CHRONIC KIDNEY DISEASE: ICD-10-CM

## 2025-05-01 DIAGNOSIS — I48.0 PAROXYSMAL ATRIAL FIBRILLATION: ICD-10-CM

## 2025-05-01 DIAGNOSIS — R79.9 ABNORMAL FINDING OF BLOOD CHEMISTRY: ICD-10-CM

## 2025-05-01 DIAGNOSIS — B37.9 YEAST INFECTION: ICD-10-CM

## 2025-05-01 DIAGNOSIS — I70.0 AORTIC ATHEROSCLEROSIS: ICD-10-CM

## 2025-05-01 DIAGNOSIS — E78.5 DYSLIPIDEMIA: ICD-10-CM

## 2025-05-01 DIAGNOSIS — N17.9 AKI (ACUTE KIDNEY INJURY): Primary | ICD-10-CM

## 2025-05-01 DIAGNOSIS — G31.9 DEGENERATIVE DISEASE OF NERVOUS SYSTEM, UNSPECIFIED: ICD-10-CM

## 2025-05-01 DIAGNOSIS — I10 PRIMARY HYPERTENSION: ICD-10-CM

## 2025-05-01 PROCEDURE — 99999 PR PBB SHADOW E&M-EST. PATIENT-LVL III: CPT | Mod: PBBFAC,,, | Performed by: FAMILY MEDICINE

## 2025-05-01 PROCEDURE — 99214 OFFICE O/P EST MOD 30 MIN: CPT | Mod: S$PBB,,, | Performed by: FAMILY MEDICINE

## 2025-05-01 PROCEDURE — 99213 OFFICE O/P EST LOW 20 MIN: CPT | Mod: PBBFAC,PN | Performed by: FAMILY MEDICINE

## 2025-05-01 RX ORDER — FLUCONAZOLE 150 MG/1
150 TABLET ORAL DAILY
Qty: 1 TABLET | Refills: 0 | Status: SHIPPED | OUTPATIENT
Start: 2025-05-01 | End: 2025-05-02

## 2025-05-01 NOTE — ASSESSMENT & PLAN NOTE
CMP  Sodium   Date Value Ref Range Status   04/24/2025 141 136 - 145 mmol/L Final   07/18/2024 141 136 - 145 mmol/L Final     Potassium   Date Value Ref Range Status   04/24/2025 3.4 (L) 3.5 - 5.1 mmol/L Final   07/18/2024 3.3 (L) 3.5 - 5.1 mmol/L Final     Chloride   Date Value Ref Range Status   04/24/2025 99 95 - 110 mmol/L Final   07/18/2024 98 95 - 110 mmol/L Final     CO2   Date Value Ref Range Status   04/24/2025 31 (H) 23 - 29 mmol/L Final   07/18/2024 30 (H) 23 - 29 mmol/L Final     Glucose   Date Value Ref Range Status   04/24/2025 97 70 - 110 mg/dL Final   07/18/2024 123 (H) 70 - 110 mg/dL Final     BUN   Date Value Ref Range Status   04/24/2025 29 (H) 8 - 23 mg/dL Final     Creatinine   Date Value Ref Range Status   04/24/2025 1.4 0.5 - 1.4 mg/dL Final     Calcium   Date Value Ref Range Status   04/24/2025 9.4 8.7 - 10.5 mg/dL Final   07/18/2024 10.1 8.7 - 10.5 mg/dL Final     Protein Total   Date Value Ref Range Status   04/24/2025 6.6 6.0 - 8.4 gm/dL Final     Total Protein   Date Value Ref Range Status   07/18/2024 7.1 6.0 - 8.4 g/dL Final     Albumin   Date Value Ref Range Status   04/24/2025 3.8 3.5 - 5.2 g/dL Final   07/18/2024 4.3 3.5 - 5.2 g/dL Final     Total Bilirubin   Date Value Ref Range Status   07/18/2024 0.7 0.1 - 1.0 mg/dL Final     Comment:     For infants and newborns, interpretation of results should be based  on gestational age, weight and in agreement with clinical  observations.    Premature Infant recommended reference ranges:  Up to 24 hours.............<8.0 mg/dL  Up to 48 hours............<12.0 mg/dL  3-5 days..................<15.0 mg/dL  6-29 days.................<15.0 mg/dL       Bilirubin Total   Date Value Ref Range Status   04/24/2025 0.6 0.1 - 1.0 mg/dL Final     Comment:     For infants and newborns, interpretation of results should be based   on gestational age, weight and in agreement with clinical   observations.    Premature Infant recommended reference ranges:    0-24 hours:  <8.0 mg/dL   24-48 hours: <12.0 mg/dL   3-5 days:    <15.0 mg/dL   6-29 days:   <15.0 mg/dL     Alkaline Phosphatase   Date Value Ref Range Status   07/18/2024 38 (L) 55 - 135 U/L Final     ALP   Date Value Ref Range Status   04/24/2025 38 (L) 40 - 150 unit/L Final     AST   Date Value Ref Range Status   04/24/2025 19 11 - 45 unit/L Final   07/18/2024 34 10 - 40 U/L Final     ALT   Date Value Ref Range Status   04/24/2025 18 10 - 44 unit/L Final   07/18/2024 36 10 - 44 U/L Final     Anion Gap   Date Value Ref Range Status   04/24/2025 11 8 - 16 mmol/L Final     eGFR   Date Value Ref Range Status   04/24/2025 38 (L) >60 mL/min/1.73/m2 Final     Comment:     Estimated GFR calculated using the CKD-EPI creatinine (2021) equation.   07/18/2024 56 (A) >60 mL/min/1.73 m^2 Final     Due for recheck - last labs consistent with Stage 3a

## 2025-05-01 NOTE — PROGRESS NOTES
"  Subjective:       Patient ID: Liliana Hermosillo is a 84 y.o. female.    Chief Complaint: Follow-up (Er follow up for UTI)    History of Present Illness    CHIEF COMPLAINT:  Ms. Hermosillo presents today for UTI follow up after ER visit    GENITOURINARY:  She was recently diagnosed with a UTI caused by two different organisms at the ER and prescribed Keflex. She reports vaginal burning symptoms suggestive of a yeast infection. Today is the best day she has experienced in 6 months.    LABS:  Magnesium and thyroid levels were normal. Kidney function showed significant decrease. Follow-up metabolic panel is scheduled.      ROS:  Female Genitourinary: +vaginal itching or burning             Problem List[1]  Liliana has a current medication list which includes the following prescription(s): azelastine, cephalexin, clobetasol, hydrochlorothiazide, ibuprofen, ipratropium, meclizine, multivitamin, nexletol, omeprazole, ondansetron, prednisone, turmeric, vitamin e, zolpidem, ashwagandha root extract, fluconazole, hydroxyzine pamoate, sucralfate, and trazodone.        Health Maintenance Due   Topic Date Due    Pneumococcal Vaccines (Age 50+) (1 of 2 - PCV) Never done    Shingles Vaccine (1 of 2) Never done    RSV Vaccine (Age 60+ and Pregnant patients) (1 - 1-dose 75+ series) Never done    COVID-19 Vaccine (3 - 2024-25 season) 09/01/2024    DEXA Scan  11/08/2024      Health Maintenance reviewed and discussed.   Objective:      Vitals:    05/01/25 1521   BP: 108/60   Pulse: 97   Resp: 16   SpO2: 97%   Weight: 63.8 kg (140 lb 9.6 oz)   Height: 5' 3" (1.6 m)   PainSc: 0-No pain     Body mass index is 24.91 kg/m².  Physical Exam  Vitals and nursing note reviewed.   Constitutional:       General: She is not in acute distress.     Appearance: She is not ill-appearing.   Cardiovascular:      Rate and Rhythm: Normal rate and regular rhythm.      Heart sounds: No murmur heard.  Pulmonary:      Effort: Pulmonary effort is normal.      Breath " sounds: Normal breath sounds. No wheezing.   Skin:     General: Skin is warm and dry.      Findings: No rash.   Neurological:      Mental Status: She is alert.      Comments: Ambulating with walker     Psychiatric:         Mood and Affect: Mood normal.         Behavior: Behavior normal.         Assessment:       Assessment & Plan    1. Reviewed ER visit results, noting UTI with two different organisms.  2. Keflex prescribed by ER likely covered the UTI.  3. Current symptoms suggestive of yeast infection.  4. Considered upcoming colonoscopy prep and social plans in medication timing.           Plan:       1. CHARLINE (acute kidney injury)  -     Basic Metabolic Panel; Future; Expected date: 05/01/2025    2. Paroxysmal atrial fibrillation  Assessment & Plan:  No issues. Stable.       3. Stage 3a chronic kidney disease  Assessment & Plan:  CMP  Sodium   Date Value Ref Range Status   04/24/2025 141 136 - 145 mmol/L Final   07/18/2024 141 136 - 145 mmol/L Final     Potassium   Date Value Ref Range Status   04/24/2025 3.4 (L) 3.5 - 5.1 mmol/L Final   07/18/2024 3.3 (L) 3.5 - 5.1 mmol/L Final     Chloride   Date Value Ref Range Status   04/24/2025 99 95 - 110 mmol/L Final   07/18/2024 98 95 - 110 mmol/L Final     CO2   Date Value Ref Range Status   04/24/2025 31 (H) 23 - 29 mmol/L Final   07/18/2024 30 (H) 23 - 29 mmol/L Final     Glucose   Date Value Ref Range Status   04/24/2025 97 70 - 110 mg/dL Final   07/18/2024 123 (H) 70 - 110 mg/dL Final     BUN   Date Value Ref Range Status   04/24/2025 29 (H) 8 - 23 mg/dL Final     Creatinine   Date Value Ref Range Status   04/24/2025 1.4 0.5 - 1.4 mg/dL Final     Calcium   Date Value Ref Range Status   04/24/2025 9.4 8.7 - 10.5 mg/dL Final   07/18/2024 10.1 8.7 - 10.5 mg/dL Final     Protein Total   Date Value Ref Range Status   04/24/2025 6.6 6.0 - 8.4 gm/dL Final     Total Protein   Date Value Ref Range Status   07/18/2024 7.1 6.0 - 8.4 g/dL Final     Albumin   Date Value Ref Range  Status   04/24/2025 3.8 3.5 - 5.2 g/dL Final   07/18/2024 4.3 3.5 - 5.2 g/dL Final     Total Bilirubin   Date Value Ref Range Status   07/18/2024 0.7 0.1 - 1.0 mg/dL Final     Comment:     For infants and newborns, interpretation of results should be based  on gestational age, weight and in agreement with clinical  observations.    Premature Infant recommended reference ranges:  Up to 24 hours.............<8.0 mg/dL  Up to 48 hours............<12.0 mg/dL  3-5 days..................<15.0 mg/dL  6-29 days.................<15.0 mg/dL       Bilirubin Total   Date Value Ref Range Status   04/24/2025 0.6 0.1 - 1.0 mg/dL Final     Comment:     For infants and newborns, interpretation of results should be based   on gestational age, weight and in agreement with clinical   observations.    Premature Infant recommended reference ranges:   0-24 hours:  <8.0 mg/dL   24-48 hours: <12.0 mg/dL   3-5 days:    <15.0 mg/dL   6-29 days:   <15.0 mg/dL     Alkaline Phosphatase   Date Value Ref Range Status   07/18/2024 38 (L) 55 - 135 U/L Final     ALP   Date Value Ref Range Status   04/24/2025 38 (L) 40 - 150 unit/L Final     AST   Date Value Ref Range Status   04/24/2025 19 11 - 45 unit/L Final   07/18/2024 34 10 - 40 U/L Final     ALT   Date Value Ref Range Status   04/24/2025 18 10 - 44 unit/L Final   07/18/2024 36 10 - 44 U/L Final     Anion Gap   Date Value Ref Range Status   04/24/2025 11 8 - 16 mmol/L Final     eGFR   Date Value Ref Range Status   04/24/2025 38 (L) >60 mL/min/1.73/m2 Final     Comment:     Estimated GFR calculated using the CKD-EPI creatinine (2021) equation.   07/18/2024 56 (A) >60 mL/min/1.73 m^2 Final     Due for recheck - last labs consistent with Stage 3a    Orders:  -     Basic Metabolic Panel; Future; Expected date: 05/01/2025    4. Aortic atherosclerosis  Assessment & Plan:  Chronic. Stable.     Orders:  -     Lipid Panel; Future; Expected date: 05/01/2025    5. Degenerative disease of nervous system,  unspecified  Assessment & Plan:  No concerns or complaints. Ambulating with walker.       6. Dyslipidemia  -     Lipid Panel; Future; Expected date: 05/01/2025    7. Primary hypertension  -     CBC Auto Differential; Future; Expected date: 05/01/2025  -     TSH; Future; Expected date: 05/01/2025    8. Abnormal finding of blood chemistry  -     Hemoglobin A1C; Future; Expected date: 05/01/2025    9. Yeast infection  -     fluconazole (DIFLUCAN) 150 MG Tab; Take 1 tablet (150 mg total) by mouth once daily. for 1 day  Dispense: 1 tablet; Refill: 0         This note was generated with the assistance of ambient listening technology. Verbal consent was obtained by the patient and accompanying visitor(s) for the recording of patient appointment to facilitate this note. I attest to having reviewed and edited the generated note for accuracy, though some syntax or spelling errors may persist. Please contact the author of this note for any clarification.         [1]   Patient Active Problem List  Diagnosis    Closed right hip fracture    Hypertension    Dyslipidemia    Gastroesophageal reflux disease    Sciatica of right side    Family history of colon cancer    Family history of lung cancer    Vertebral compression fracture    Age-related osteoporosis without current pathological fracture    Constipation    Diverticulosis    Multiple thyroid nodules    Advice given about COVID-19 virus infection    Stiffness of cervical spine    Neck pain, musculoskeletal    Gait instability    Abdominal pain    Hiatal hernia    Fatty liver    Paroxysmal atrial fibrillation    Aortic atherosclerosis    Insomnia    Degenerative disease of nervous system, unspecified    Stage 3a chronic kidney disease

## 2025-05-05 ENCOUNTER — LAB VISIT (OUTPATIENT)
Dept: LAB | Facility: HOSPITAL | Age: 85
End: 2025-05-05
Attending: FAMILY MEDICINE
Payer: MEDICARE

## 2025-05-05 DIAGNOSIS — I10 PRIMARY HYPERTENSION: ICD-10-CM

## 2025-05-05 DIAGNOSIS — R79.9 ABNORMAL FINDING OF BLOOD CHEMISTRY: ICD-10-CM

## 2025-05-05 DIAGNOSIS — N17.9 AKI (ACUTE KIDNEY INJURY): ICD-10-CM

## 2025-05-05 DIAGNOSIS — E78.5 DYSLIPIDEMIA: ICD-10-CM

## 2025-05-05 DIAGNOSIS — N18.31 STAGE 3A CHRONIC KIDNEY DISEASE: ICD-10-CM

## 2025-05-05 DIAGNOSIS — I70.0 AORTIC ATHEROSCLEROSIS: ICD-10-CM

## 2025-05-05 LAB
ABSOLUTE EOSINOPHIL (OHS): 0.1 K/UL
ABSOLUTE MONOCYTE (OHS): 0.48 K/UL (ref 0.3–1)
ABSOLUTE NEUTROPHIL COUNT (OHS): 3.08 K/UL (ref 1.8–7.7)
ANION GAP (OHS): 10 MMOL/L (ref 8–16)
BASOPHILS # BLD AUTO: 0.02 K/UL
BASOPHILS NFR BLD AUTO: 0.4 %
BUN SERPL-MCNC: 22 MG/DL (ref 8–23)
CALCIUM SERPL-MCNC: 10.1 MG/DL (ref 8.7–10.5)
CHLORIDE SERPL-SCNC: 101 MMOL/L (ref 95–110)
CHOLEST SERPL-MCNC: 229 MG/DL (ref 120–199)
CHOLEST/HDLC SERPL: 3.9 {RATIO} (ref 2–5)
CO2 SERPL-SCNC: 29 MMOL/L (ref 23–29)
CREAT SERPL-MCNC: 1 MG/DL (ref 0.5–1.4)
EAG (OHS): 114 MG/DL (ref 68–131)
ERYTHROCYTE [DISTWIDTH] IN BLOOD BY AUTOMATED COUNT: 12.1 % (ref 11.5–14.5)
GFR SERPLBLD CREATININE-BSD FMLA CKD-EPI: 56 ML/MIN/1.73/M2
GLUCOSE SERPL-MCNC: 109 MG/DL (ref 70–110)
HBA1C MFR BLD: 5.6 % (ref 4–5.6)
HCT VFR BLD AUTO: 42 % (ref 37–48.5)
HDLC SERPL-MCNC: 58 MG/DL (ref 40–75)
HDLC SERPL: 25.3 % (ref 20–50)
HGB BLD-MCNC: 13.6 GM/DL (ref 12–16)
IMM GRANULOCYTES # BLD AUTO: 0.02 K/UL (ref 0–0.04)
IMM GRANULOCYTES NFR BLD AUTO: 0.4 % (ref 0–0.5)
LDLC SERPL CALC-MCNC: 137.2 MG/DL (ref 63–159)
LYMPHOCYTES # BLD AUTO: 1.41 K/UL (ref 1–4.8)
MCH RBC QN AUTO: 28.6 PG (ref 27–31)
MCHC RBC AUTO-ENTMCNC: 32.4 G/DL (ref 32–36)
MCV RBC AUTO: 88 FL (ref 82–98)
NONHDLC SERPL-MCNC: 171 MG/DL
NUCLEATED RBC (/100WBC) (OHS): 0 /100 WBC
PLATELET # BLD AUTO: 203 K/UL (ref 150–450)
PMV BLD AUTO: 10.8 FL (ref 9.2–12.9)
POTASSIUM SERPL-SCNC: 4 MMOL/L (ref 3.5–5.1)
RBC # BLD AUTO: 4.76 M/UL (ref 4–5.4)
RELATIVE EOSINOPHIL (OHS): 2 %
RELATIVE LYMPHOCYTE (OHS): 27.6 % (ref 18–48)
RELATIVE MONOCYTE (OHS): 9.4 % (ref 4–15)
RELATIVE NEUTROPHIL (OHS): 60.2 % (ref 38–73)
SODIUM SERPL-SCNC: 140 MMOL/L (ref 136–145)
TRIGL SERPL-MCNC: 169 MG/DL (ref 30–150)
TSH SERPL-ACNC: 2.54 UIU/ML (ref 0.4–4)
WBC # BLD AUTO: 5.11 K/UL (ref 3.9–12.7)

## 2025-05-05 PROCEDURE — 85025 COMPLETE CBC W/AUTO DIFF WBC: CPT

## 2025-05-05 PROCEDURE — 80061 LIPID PANEL: CPT

## 2025-05-05 PROCEDURE — 84443 ASSAY THYROID STIM HORMONE: CPT

## 2025-05-05 PROCEDURE — 80048 BASIC METABOLIC PNL TOTAL CA: CPT

## 2025-05-05 PROCEDURE — 83036 HEMOGLOBIN GLYCOSYLATED A1C: CPT

## 2025-05-05 PROCEDURE — 36415 COLL VENOUS BLD VENIPUNCTURE: CPT

## 2025-05-06 ENCOUNTER — PATIENT MESSAGE (OUTPATIENT)
Dept: FAMILY MEDICINE | Facility: CLINIC | Age: 85
End: 2025-05-06
Payer: MEDICARE

## 2025-05-08 ENCOUNTER — RESULTS FOLLOW-UP (OUTPATIENT)
Dept: FAMILY MEDICINE | Facility: CLINIC | Age: 85
End: 2025-05-08

## 2025-05-13 ENCOUNTER — LAB VISIT (OUTPATIENT)
Dept: LAB | Facility: HOSPITAL | Age: 85
End: 2025-05-13
Payer: MEDICARE

## 2025-05-13 ENCOUNTER — TELEPHONE (OUTPATIENT)
Dept: FAMILY MEDICINE | Facility: CLINIC | Age: 85
End: 2025-05-13
Payer: MEDICARE

## 2025-05-13 ENCOUNTER — OFFICE VISIT (OUTPATIENT)
Dept: FAMILY MEDICINE | Facility: CLINIC | Age: 85
End: 2025-05-13
Payer: MEDICARE

## 2025-05-13 VITALS
DIASTOLIC BLOOD PRESSURE: 76 MMHG | WEIGHT: 139.81 LBS | BODY MASS INDEX: 24.77 KG/M2 | OXYGEN SATURATION: 97 % | SYSTOLIC BLOOD PRESSURE: 110 MMHG | HEIGHT: 63 IN | RESPIRATION RATE: 15 BRPM | HEART RATE: 85 BPM

## 2025-05-13 DIAGNOSIS — R31.9 HEMATURIA WITH PROTEINURIA: ICD-10-CM

## 2025-05-13 DIAGNOSIS — R80.9 HEMATURIA WITH PROTEINURIA: Primary | ICD-10-CM

## 2025-05-13 DIAGNOSIS — R82.2 BILIRUBIN IN URINE: ICD-10-CM

## 2025-05-13 DIAGNOSIS — R31.9 HEMATURIA WITH PROTEINURIA: Primary | ICD-10-CM

## 2025-05-13 DIAGNOSIS — R80.9 HEMATURIA WITH PROTEINURIA: ICD-10-CM

## 2025-05-13 LAB
ALBUMIN SERPL BCP-MCNC: 4.1 G/DL (ref 3.5–5.2)
ALP SERPL-CCNC: 39 UNIT/L (ref 40–150)
ALT SERPL W/O P-5'-P-CCNC: 23 UNIT/L (ref 10–44)
ANION GAP (OHS): 12 MMOL/L (ref 8–16)
AST SERPL-CCNC: 26 UNIT/L (ref 11–45)
BILIRUB SERPL-MCNC: 0.8 MG/DL (ref 0.1–1)
BILIRUBIN, UA POC OHS: ABNORMAL
BLOOD, UA POC OHS: NEGATIVE
BUN SERPL-MCNC: 21 MG/DL (ref 8–23)
CALCIUM SERPL-MCNC: 9.5 MG/DL (ref 8.7–10.5)
CHLORIDE SERPL-SCNC: 102 MMOL/L (ref 95–110)
CLARITY, UA POC OHS: CLEAR
CO2 SERPL-SCNC: 28 MMOL/L (ref 23–29)
COLOR, UA POC OHS: ABNORMAL
CREAT SERPL-MCNC: 1 MG/DL (ref 0.5–1.4)
GFR SERPLBLD CREATININE-BSD FMLA CKD-EPI: 56 ML/MIN/1.73/M2
GLUCOSE SERPL-MCNC: 101 MG/DL (ref 70–110)
GLUCOSE, UA POC OHS: NEGATIVE
KETONES, UA POC OHS: NEGATIVE
LEUKOCYTES, UA POC OHS: ABNORMAL
NITRITE, UA POC OHS: NEGATIVE
PH, UA POC OHS: 6
POTASSIUM SERPL-SCNC: 3.7 MMOL/L (ref 3.5–5.1)
PROT SERPL-MCNC: 6.8 GM/DL (ref 6–8.4)
PROTEIN, UA POC OHS: 30
SODIUM SERPL-SCNC: 142 MMOL/L (ref 136–145)
SPECIFIC GRAVITY, UA POC OHS: 1.02
UROBILINOGEN, UA POC OHS: 1

## 2025-05-13 PROCEDURE — 99213 OFFICE O/P EST LOW 20 MIN: CPT | Mod: PBBFAC,PN | Performed by: NURSE PRACTITIONER

## 2025-05-13 PROCEDURE — 99999 PR PBB SHADOW E&M-EST. PATIENT-LVL III: CPT | Mod: PBBFAC,,, | Performed by: NURSE PRACTITIONER

## 2025-05-13 PROCEDURE — 81003 URINALYSIS AUTO W/O SCOPE: CPT | Mod: PBBFAC,PN | Performed by: NURSE PRACTITIONER

## 2025-05-13 PROCEDURE — 87086 URINE CULTURE/COLONY COUNT: CPT | Performed by: NURSE PRACTITIONER

## 2025-05-13 PROCEDURE — 88112 CYTOPATH CELL ENHANCE TECH: CPT | Mod: TC | Performed by: NURSE PRACTITIONER

## 2025-05-13 PROCEDURE — 80053 COMPREHEN METABOLIC PANEL: CPT

## 2025-05-13 PROCEDURE — 36415 COLL VENOUS BLD VENIPUNCTURE: CPT

## 2025-05-13 PROCEDURE — 99999PBSHW POCT URINALYSIS(INSTRUMENT): Mod: PBBFAC,,,

## 2025-05-13 NOTE — TELEPHONE ENCOUNTER
Spoke w/ pt   Pt requesting urinalysis d/t dark urine, dysuria.   Appt scheduled w/ NP  Pt voiced understanding.

## 2025-05-13 NOTE — TELEPHONE ENCOUNTER
----- Message from Esposito sent at 5/13/2025  9:36 AM CDT -----  Contact: pt  Type:  Needs Medical AdviceWho Called: ptWould the patient rather a call back or a response via [facility]? Call back Best Call Back Number: [phone] Information: pt is wanting someone to call her and advise her what she should do her urine is a little dark and she wants to possible leave a sample. Please call back when you can !

## 2025-05-13 NOTE — PROGRESS NOTES
To Subjective:       Patient ID: Liliana Hermosillo is a 84 y.o. female.    Chief Complaint: Urinary Tract Infection and Dysuria    Liliana Hermosillo presents to the clinic today for concerns of a UTI  Established patient within the clinic, new patient to myself    Under the care of the following:  PCP: Dr. Matias  Urology: ZANDER Juárez  Gastroenterology: INGRID Brasher  Otolaryngologist: Dr. Jaramillo  GYN: Dr. Cantu/ ZARINA MCCARTY    Patient Active Problem List  Diagnosis  · Closed right hip fracture  · Hypertension  · Dyslipidemia  · Gastroesophageal reflux disease  · Sciatica of right side  · Family history of colon cancer  · Family history of lung cancer  · Vertebral compression fracture  · Age-related osteoporosis without current pathological fracture  · Constipation  · Diverticulosis  · Multiple thyroid nodules  · Advice given about COVID-19 virus infection  · Stiffness of cervical spine  · Neck pain, musculoskeletal  · Gait instability  · Abdominal pain  · Hiatal hernia  · Fatty liver  · Paroxysmal atrial fibrillation  · Aortic atherosclerosis  · Insomnia  · Degenerative disease of nervous system, unspecified  · Stage 3a chronic kidney disease    Recently treated for UTI with Keflex  Reports history of hysterectomy  History of renal stone x1 in her twenties  Reports over the weekend she noticed visible blood in her urine, it was also noted to her incontinence pad and toliet paper with wiping. Reports has hx of hemorrhoids and thought she could have been experiencing a flare when she first noted blood in the tolier, but that it was too far up on her incontinence pad and only noted when wiping her vaginal area.   Reports she does not experience normal urinary tract symptoms (burning/dysuria)  Has been trying to maintain hydration  Reports over the weekend she did consume more alcohol based products than she normally does  Old fashions x2 on Saturday Sunday- had wine for mother's day, >2 glasses        Review of Systems   "  Problem List[1]    Objective:      Physical Exam  Vitals and nursing note reviewed.   Constitutional:       General: She is not in acute distress.     Appearance: Normal appearance. She is not ill-appearing.   Cardiovascular:      Rate and Rhythm: Normal rate and regular rhythm.   Pulmonary:      Effort: Pulmonary effort is normal. No respiratory distress.      Breath sounds: Normal breath sounds.   Abdominal:      General: Bowel sounds are normal. There is no distension.      Tenderness: There is no abdominal tenderness.   Skin:     General: Skin is warm and dry.      Coloration: Skin is not jaundiced.      Findings: No rash.   Neurological:      Mental Status: She is alert.      Comments: Ambulating with walker     Psychiatric:         Mood and Affect: Mood normal.         Behavior: Behavior normal.         Lab Results   Component Value Date    WBC 5.11 05/05/2025    HGB 13.6 05/05/2025    HCT 42.0 05/05/2025     05/05/2025    CHOL 229 (H) 05/05/2025    TRIG 169 (H) 05/05/2025    HDL 58 05/05/2025    ALT 18 04/24/2025    AST 19 04/24/2025     05/05/2025    K 4.0 05/05/2025     05/05/2025    CREATININE 1.0 05/05/2025    BUN 22 05/05/2025    CO2 29 05/05/2025    TSH 2.538 05/05/2025    INR 0.9 06/30/2014    HGBA1C 5.6 05/05/2025     The ASCVD Risk score (Angelica DK, et al., 2019) failed to calculate for the following reasons:    The 2019 ASCVD risk score is only valid for ages 40 to 79  Visit Vitals  /76 (BP Location: Left arm, Patient Position: Sitting)   Pulse 85   Resp 15   Ht 5' 3" (1.6 m)   Wt 63.4 kg (139 lb 12.8 oz)   SpO2 97%   BMI 24.76 kg/m²      Assessment:       1. Hematuria with proteinuria    2. Bilirubin in urine        Plan:       1. Hematuria with proteinuria  -     POCT Urinalysis(Instrument)  -     Urine Culture High Risk  -     Cytology, Urine  -     Comprehensive Metabolic Panel; Future; Expected date: 05/13/2025    2. Bilirubin in urine  -     Comprehensive Metabolic " Panel; Future; Expected date: 05/13/2025    Obtain blood work for review  Urine to be sent for cytology/ culture- will call patient with results   Maintain hydration, avoid alcohol- tylenol based products   Worsening s/sx- ER      No follow-ups on file.      Future Appointments       Date Provider Specialty Appt Notes    10/2/2025 Keara Matias MD Family Medicine 6m f/u                  [1]   Patient Active Problem List  Diagnosis    Closed right hip fracture    Hypertension    Dyslipidemia    Gastroesophageal reflux disease    Sciatica of right side    Family history of colon cancer    Family history of lung cancer    Vertebral compression fracture    Age-related osteoporosis without current pathological fracture    Constipation    Diverticulosis    Multiple thyroid nodules    Advice given about COVID-19 virus infection    Stiffness of cervical spine    Neck pain, musculoskeletal    Gait instability    Abdominal pain    Hiatal hernia    Fatty liver    Paroxysmal atrial fibrillation    Aortic atherosclerosis    Insomnia    Degenerative disease of nervous system, unspecified    Stage 3a chronic kidney disease

## 2025-05-15 ENCOUNTER — RESULTS FOLLOW-UP (OUTPATIENT)
Dept: FAMILY MEDICINE | Facility: CLINIC | Age: 85
End: 2025-05-15

## 2025-05-15 DIAGNOSIS — R82.89 ABNORMAL URINE CYTOLOGY: ICD-10-CM

## 2025-05-15 DIAGNOSIS — R31.9 HEMATURIA WITH PROTEINURIA: Primary | ICD-10-CM

## 2025-05-15 DIAGNOSIS — R80.9 HEMATURIA WITH PROTEINURIA: Primary | ICD-10-CM

## 2025-05-15 LAB
BACTERIA UR CULT: NORMAL
ESTROGEN SERPL-MCNC: NORMAL PG/ML
INSULIN SERPL-ACNC: NORMAL U[IU]/ML
LAB AP GROSS DESCRIPTION: NORMAL
LAB AP PERFORMING LOCATION(S): NORMAL
LAB AP URINE CYTOLOGY INTERPRETATION SPECIMEN 1: NORMAL

## 2025-05-20 ENCOUNTER — OFFICE VISIT (OUTPATIENT)
Dept: UROLOGY | Facility: CLINIC | Age: 85
End: 2025-05-20
Payer: MEDICARE

## 2025-05-20 VITALS — HEIGHT: 63 IN | BODY MASS INDEX: 24.76 KG/M2 | WEIGHT: 139.75 LBS

## 2025-05-20 DIAGNOSIS — R31.0 GROSS HEMATURIA: ICD-10-CM

## 2025-05-20 DIAGNOSIS — R31.0 GROSS HEMATURIA: Primary | ICD-10-CM

## 2025-05-20 LAB
AMM URATE CRY URNS QL MICRO: NORMAL
AMORPH CRY URNS QL MICRO: NORMAL
BACTERIA #/AREA URNS HPF: NORMAL /HPF
CA CARBONATE CRY URNS QL MICRO: NORMAL
CA PHOS CRY URNS QL MICRO: NORMAL
CAOX CRY URNS QL MICRO: NORMAL
EPITH CASTS #/AREA URNS LPF: 0 /LPF (ref ?–0)
FATTY CASTS #/AREA URNS LPF: 0 /LPF (ref ?–0)
GRAN CASTS #/AREA URNS LPF: 0 /LPF (ref ?–0)
HYALINE CASTS #/AREA URNS LPF: 0 /LPF (ref 0–1)
MICROSCOPIC COMMENT: NORMAL
NON-SQ EPI CELLS #/AREA URNS HPF: 0 /HPF
OTHER ELEMENTS URNS MICRO: NORMAL
RBC #/AREA URNS HPF: 0 /HPF (ref 0–4)
RBC CASTS #/AREA URNS LPF: 0 /LPF (ref ?–0)
SQUAMOUS #/AREA URNS HPF: 2 /HPF
TRI-PHOS CRY URNS QL MICRO: NORMAL
TRICHOMONAS UR QL MICRO: NORMAL /HPF
UNIDENT CRYS URNS QL MICRO: NORMAL
URATE CRY URNS QL MICRO: NORMAL
WAXY CASTS #/AREA URNS LPF: 0 /LPF (ref ?–0)
WBC #/AREA URNS HPF: 3 /HPF (ref 0–5)
WBC CASTS #/AREA URNS LPF: 0 /LPF (ref ?–0)
WBC CLUMPS URNS QL MICRO: NORMAL
YEAST URNS QL MICRO: NORMAL /HPF

## 2025-05-20 PROCEDURE — 87086 URINE CULTURE/COLONY COUNT: CPT | Performed by: NURSE PRACTITIONER

## 2025-05-20 PROCEDURE — 99214 OFFICE O/P EST MOD 30 MIN: CPT | Mod: S$PBB,,, | Performed by: NURSE PRACTITIONER

## 2025-05-20 PROCEDURE — 88112 CYTOPATH CELL ENHANCE TECH: CPT | Mod: TC | Performed by: NURSE PRACTITIONER

## 2025-05-20 PROCEDURE — 99215 OFFICE O/P EST HI 40 MIN: CPT | Mod: PBBFAC | Performed by: NURSE PRACTITIONER

## 2025-05-20 PROCEDURE — 81000 URINALYSIS NONAUTO W/SCOPE: CPT | Performed by: NURSE PRACTITIONER

## 2025-05-20 PROCEDURE — 99999 PR PBB SHADOW E&M-EST. PATIENT-LVL V: CPT | Mod: PBBFAC,,, | Performed by: NURSE PRACTITIONER

## 2025-05-20 NOTE — H&P (VIEW-ONLY)
[facility], [facility]  Group: Clyde/Marcella/Sal/Asa  NPs: Gely Juárez/Malini Cassidy    Note today written by:Malini Cassidy  Date of Service: 05/20/2025      CHIEF COMPLAINT: Gross hematuria.    PRESENTING ILLNESS:    Liliana Hermosillo is a 84 y.o. female who presents for gross hematuria. Last clinic visit was 8/25/22.    5/20/25  Pt states gross hematuria occurred last week, 3 episodes total.   No gross hematuria for the past 3 days  Denies dysuria or flank pain with hematuria  Seen by PCP 5/13/25 and urine obtained:  POCT UA sm bili, protein 30, tr WBC  Urine culture no growth  Urine cytology:  Negative for high grade urothelial carcinoma - Reactive urothelial cells                Crystals present, Degenerating cells      No voiding complaints as baseline    Hx of kidney stone at age 32. Surgery to remove kidney stone. No issues since    Hx of hysterectomy    History of recurrent UTI: denies  Personal or family hx of  malignancy: denies  History of  trauma: denies  Smoking history: former, quit 1985    Urine cultures:   Lab Results   Component Value Date    LABURIN No Significant Growth 05/13/2025    LABURIN >100,000 cfu/ml Pseudomonas aeruginosa (A) 04/24/2025    LABURIN (A) 04/24/2025     >100,000 cfu/ml Coagulase-negative Staphylococcus species    LABURIN No growth 08/25/2022         REVIEW OF SYSTEMS: as stated above in hpi    PATIENT HISTORY:  Past Medical History:   Diagnosis Date    Atypical ductal hyperplasia, breast     Chronic constipation     Diverticulitis     Diverticulosis     Edema     Hypertension     Mitral valve prolapse     Ovarian cyst        Past Surgical History:   Procedure Laterality Date    ANKLE ARTHROSCOPY W/ OPEN REPAIR      APPENDECTOMY  1972    BREAST BIOPSY Left     CHOLECYSTECTOMY      COLONOSCOPY      EPIDURAL STEROID INJECTION N/A 7/6/2022    Procedure: Injection, Steroid, Epidural NAIDA C7-T1;  Surgeon: Kobe Rodríguez,  MD;  Location: [facility] OR;  Service: Pain Management;  Laterality: N/A;  Medicare    EPIDURAL STEROID INJECTION INTO CERVICAL SPINE N/A 3/30/2022    Procedure: Injection-steroid-epidural-cervical;  Surgeon: Kobe Rodríguez MD;  Location: [facility];  Service: Pain Management;  Laterality: N/A;  c7-t1    ESOPHAGOGASTRODUODENOSCOPY N/A 7/2/2024    Procedure: EGD (ESOPHAGOGASTRODUODENOSCOPY);  Surgeon: Indio Galvez MD;  Location: [facility];  Service: Endoscopy;  Laterality: N/A;    EYE SURGERY  2012    HYSTERECTOMY      INJECTION OF NERVE Bilateral 9/7/2022    Procedure: INJECTION, NERVE;  Surgeon: Kobe Rodríguez MD;  Location: [facility] OR;  Service: Anesthesiology;  Laterality: Bilateral;  C2,3 Mbb and TON block    INJECTION OF NERVE Bilateral 10/12/2022    Procedure: INJECTION, NERVE;  Surgeon: Kobe Rodríguez MD;  Location: [facility] OR;  Service: Anesthesiology;  Laterality: Bilateral;  C2, C3 and TON  MBB    KNEE SURGERY      RADIOFREQUENCY ABLATION Bilateral 11/9/2022    Procedure: Radiofrequency Ablation C2-C3 and TON;  Surgeon: Kobe Rodríguez MD;  Location: [facility];  Service: Pain Management;  Laterality: Bilateral;    TONSILLECTOMY  1948    UPPER GASTROINTESTINAL ENDOSCOPY         Allergies:  Gabapentin, Pantoprazole, and Monosodium glutamate      PHYSICAL EXAMINATION:  Constitutional: She is oriented to person, place, and time. She appears well-developed and well-nourished.  She is in no apparent distress.  Abdominal:  She exhibits no distension.  There is no CVA tenderness.   Neurological: She is alert and oriented to person, place, and time.   Psych: Cooperative with normal affect.        Physical Exam      LABS:      Lab Results   Component Value Date    CREATININE 1.0 05/13/2025     Lab Results   Component Value Date    HGBA1C 5.6 05/05/2025         IMPRESSION:    Encounter Diagnoses   Name Primary?    Gross hematuria        PLAN:  -I explained to the patient that the causes of hematuria, whether it be gross  hematuria or microhematuria, are many. Nevertheless, I explained to the patient that the evaluation in both cases consists of upper tract imaging followed by flexible cystoscopy. I described the rationale and procedure for both and answered all questions.  Hematuria work up discussed in detail.   Urine sent for micro, culture and cytology  CT urogram scheduled with creatinine prior  Cysto with Dr Huang 6/17/25 in [place], order placed to schedule    -Will call urine and CT results once completed    -F/u for cysto    I encouraged her or any of her family members to call or email me with questions and/or concerns.      30 minutes of total time spent on the encounter, which includes face to face time and non-face to face time preparing to see the patient (eg, review of tests), Obtaining and/or reviewing separately obtained history, Documenting clinical information in the electronic or other health record, Independently interpreting results (not separately reported) and communicating results to the patient/family/caregiver, or Care coordination (not separately reported).

## 2025-05-20 NOTE — PROGRESS NOTES
[facility]  Group: Clyde/Marcella/Sal/Asa  NPs: Gely Juárez/Malini Cassidy    Note today written by:Malini Cassidy  Date of Service: 05/20/2025      CHIEF COMPLAINT: Gross hematuria.    PRESENTING ILLNESS:    Liliana Hermosillo is a 84 y.o. female who presents for gross hematuria. Last clinic visit was 8/25/22.    5/20/25  Pt states gross hematuria occurred last week, 3 episodes total.   No gross hematuria for the past 3 days  Denies dysuria or flank pain with hematuria  Seen by PCP 5/13/25 and urine obtained:  POCT UA sm bili, protein 30, tr WBC  Urine culture no growth  Urine cytology:  Negative for high grade urothelial carcinoma - Reactive urothelial cells                Crystals present, Degenerating cells      No voiding complaints as baseline    Hx of kidney stone at age 32. Surgery to remove kidney stone. No issues since    Hx of hysterectomy    History of recurrent UTI: denies  Personal or family hx of  malignancy: denies  History of  trauma: denies  Smoking history: former, quit 1985    Urine cultures:   Lab Results   Component Value Date    LABURIN No Significant Growth 05/13/2025    LABURIN >100,000 cfu/ml Pseudomonas aeruginosa (A) 04/24/2025    LABURIN (A) 04/24/2025     >100,000 cfu/ml Coagulase-negative Staphylococcus species    LABURIN No growth 08/25/2022         REVIEW OF SYSTEMS: as stated above in hpi    PATIENT HISTORY:  Past Medical History:   Diagnosis Date    Atypical ductal hyperplasia, breast     Chronic constipation     Diverticulitis     Diverticulosis     Edema     Hypertension     Mitral valve prolapse     Ovarian cyst        Past Surgical History:   Procedure Laterality Date    ANKLE ARTHROSCOPY W/ OPEN REPAIR      APPENDECTOMY  1972    BREAST BIOPSY Left     CHOLECYSTECTOMY      COLONOSCOPY      EPIDURAL STEROID INJECTION N/A 7/6/2022    Procedure: Injection, Steroid, Epidural NAIDA C7-T1;  Surgeon: Kobe Rodríguez,  MD;  Location: [facility] OR;  Service: Pain Management;  Laterality: N/A;  Medicare    EPIDURAL STEROID INJECTION INTO CERVICAL SPINE N/A 3/30/2022    Procedure: Injection-steroid-epidural-cervical;  Surgeon: Kobe Rodríguez MD;  Location: [facility];  Service: Pain Management;  Laterality: N/A;  c7-t1    ESOPHAGOGASTRODUODENOSCOPY N/A 7/2/2024    Procedure: EGD (ESOPHAGOGASTRODUODENOSCOPY);  Surgeon: Indio Galvez MD;  Location: [facility];  Service: Endoscopy;  Laterality: N/A;    EYE SURGERY  2012    HYSTERECTOMY      INJECTION OF NERVE Bilateral 9/7/2022    Procedure: INJECTION, NERVE;  Surgeon: Kobe Rodríguez MD;  Location: [facility] OR;  Service: Anesthesiology;  Laterality: Bilateral;  C2,3 Mbb and TON block    INJECTION OF NERVE Bilateral 10/12/2022    Procedure: INJECTION, NERVE;  Surgeon: Kobe Rodríguez MD;  Location: [facility] OR;  Service: Anesthesiology;  Laterality: Bilateral;  C2, C3 and TON  MBB    KNEE SURGERY      RADIOFREQUENCY ABLATION Bilateral 11/9/2022    Procedure: Radiofrequency Ablation C2-C3 and TON;  Surgeon: Kobe Rodríguez MD;  Location: [facility];  Service: Pain Management;  Laterality: Bilateral;    TONSILLECTOMY  1948    UPPER GASTROINTESTINAL ENDOSCOPY         Allergies:  Gabapentin, Pantoprazole, and Monosodium glutamate      PHYSICAL EXAMINATION:  Constitutional: She is oriented to person, place, and time. She appears well-developed and well-nourished.  She is in no apparent distress.  Abdominal:  She exhibits no distension.  There is no CVA tenderness.   Neurological: She is alert and oriented to person, place, and time.   Psych: Cooperative with normal affect.        Physical Exam      LABS:      Lab Results   Component Value Date    CREATININE 1.0 05/13/2025     Lab Results   Component Value Date    HGBA1C 5.6 05/05/2025         IMPRESSION:    Encounter Diagnoses   Name Primary?    Gross hematuria        PLAN:  -I explained to the patient that the causes of hematuria, whether it be gross  hematuria or microhematuria, are many. Nevertheless, I explained to the patient that the evaluation in both cases consists of upper tract imaging followed by flexible cystoscopy. I described the rationale and procedure for both and answered all questions.  Hematuria work up discussed in detail.   Urine sent for micro, culture and cytology  CT urogram scheduled with creatinine prior  Cysto with Dr Huang 6/17/25 in [place], order placed to schedule    -Will call urine and CT results once completed    -F/u for cysto    I encouraged her or any of her family members to call or email me with questions and/or concerns.      30 minutes of total time spent on the encounter, which includes face to face time and non-face to face time preparing to see the patient (eg, review of tests), Obtaining and/or reviewing separately obtained history, Documenting clinical information in the electronic or other health record, Independently interpreting results (not separately reported) and communicating results to the patient/family/caregiver, or Care coordination (not separately reported).

## 2025-05-20 NOTE — PROGRESS NOTES
Procedure Order to Urology [[id number]]    Electronically signed by: Malini Cassidy NP on 05/20/25 0845 Status: Active   Ordering user: Malini Cassidy NP 05/20/25 0845 Authorized by: Malini Cassidy NP   Ordering mode: Standard   Frequency:  05/20/25 -     Diagnoses  Gross hematuria [R31.0]   Questionnaire    Question Answer   Procedure Cystoscopy Comment - alissa Mata, 6/17/25   Facility Name: [facility]   ASC, Please order Urine POCT without micro . Local sedation (no urine Dr Bray or Dr mata)

## 2025-05-22 ENCOUNTER — RESULTS FOLLOW-UP (OUTPATIENT)
Dept: UROLOGY | Facility: CLINIC | Age: 85
End: 2025-05-22

## 2025-05-30 ENCOUNTER — HOSPITAL ENCOUNTER (OUTPATIENT)
Dept: RADIOLOGY | Facility: HOSPITAL | Age: 85
Discharge: HOME | End: 2025-05-30
Attending: NURSE PRACTITIONER
Payer: MEDICARE

## 2025-05-30 DIAGNOSIS — R31.0 GROSS HEMATURIA: ICD-10-CM

## 2025-06-03 ENCOUNTER — HOSPITAL ENCOUNTER (OUTPATIENT)
Dept: RADIOLOGY | Facility: HOSPITAL | Age: 85
Discharge: HOME | End: 2025-06-03
Attending: NURSE PRACTITIONER
Payer: MEDICARE

## 2025-06-03 PROCEDURE — 74178 CT ABD&PLV WO CNTR FLWD CNTR: CPT | Mod: 26,,, | Performed by: RADIOLOGY

## 2025-06-03 PROCEDURE — 74178 CT ABD&PLV WO CNTR FLWD CNTR: CPT | Mod: TC

## 2025-06-03 PROCEDURE — 25500020 PHARM REV CODE 255: Performed by: NURSE PRACTITIONER

## 2025-06-03 RX ADMIN — IOHEXOL 125 ML: 350 INJECTION, SOLUTION INTRAVENOUS at 09:06

## 2025-06-05 ENCOUNTER — TELEPHONE (OUTPATIENT)
Dept: UROLOGY | Facility: CLINIC | Age: 85
End: 2025-06-05
Payer: MEDICARE

## 2025-06-10 ENCOUNTER — TELEPHONE (OUTPATIENT)
Dept: UROLOGY | Facility: CLINIC | Age: 85
End: 2025-06-10
Payer: MEDICARE

## 2025-06-10 NOTE — TELEPHONE ENCOUNTER
Returned call to answer questions concerning procedure, no answer, message left with call back number.

## 2025-06-17 ENCOUNTER — HOSPITAL ENCOUNTER (OUTPATIENT)
Facility: HOSPITAL | Age: 85
Discharge: HOME | End: 2025-06-17
Attending: STUDENT IN AN ORGANIZED HEALTH CARE EDUCATION/TRAINING PROGRAM | Admitting: STUDENT IN AN ORGANIZED HEALTH CARE EDUCATION/TRAINING PROGRAM
Payer: MEDICARE

## 2025-06-17 VITALS
DIASTOLIC BLOOD PRESSURE: 62 MMHG | RESPIRATION RATE: 15 BRPM | HEIGHT: 63 IN | WEIGHT: 139 LBS | BODY MASS INDEX: 24.63 KG/M2 | HEART RATE: 76 BPM | OXYGEN SATURATION: 98 % | SYSTOLIC BLOOD PRESSURE: 132 MMHG | TEMPERATURE: 97 F

## 2025-06-17 DIAGNOSIS — R31.0 GROSS HEMATURIA: Primary | ICD-10-CM

## 2025-06-17 PROCEDURE — 25000003 PHARM REV CODE 250: Performed by: STUDENT IN AN ORGANIZED HEALTH CARE EDUCATION/TRAINING PROGRAM

## 2025-06-17 PROCEDURE — 36000706: Performed by: STUDENT IN AN ORGANIZED HEALTH CARE EDUCATION/TRAINING PROGRAM

## 2025-06-17 PROCEDURE — 52000 CYSTOURETHROSCOPY: CPT | Mod: ,,, | Performed by: STUDENT IN AN ORGANIZED HEALTH CARE EDUCATION/TRAINING PROGRAM

## 2025-06-17 RX ORDER — CIPROFLOXACIN 2 MG/ML
400 INJECTION, SOLUTION INTRAVENOUS
OUTPATIENT
Start: 2025-06-17

## 2025-06-17 RX ORDER — LIDOCAINE HYDROCHLORIDE 20 MG/ML
JELLY TOPICAL
Status: DISCONTINUED | OUTPATIENT
Start: 2025-06-17 | End: 2025-06-17 | Stop reason: HOSPADM

## 2025-06-17 RX ORDER — SULFAMETHOXAZOLE AND TRIMETHOPRIM 800; 160 MG/1; MG/1
1 TABLET ORAL 2 TIMES DAILY
Qty: 4 TABLET | Refills: 0 | Status: SHIPPED | OUTPATIENT
Start: 2025-06-17 | End: 2025-06-19

## 2025-06-17 NOTE — PROCEDURES
Cystoscopy    Date/Time: 6/17/2025 11:27 AM    Performed by: Jodi Huang MD  Authorized by: Jodi Huang MD    Consent Done?:  Yes (Written)  Prep: patient was prepped and draped in usual sterile fashion    Local anesthesia used?: Yes    Indications: hematuria    Position:  Supine  Preparation: Patient was prepped and draped in usual sterile fashion    Scope type:  Flexible cystoscope  External exam normal: Yes    Urethra normal: Yes    Bladder neck normal: Yes    Normal bladder: small low grade appearing tumor on the left lateral wall. Moderate trabeculations and a few cellules present..     patient tolerated the procedure well with no immediate complications  Comments:      Follow up on 6/27/25 for TURBT.       Jodi Huang MD  Urology Department

## 2025-06-17 NOTE — PLAN OF CARE
Nursing Disharge Note    Discharge To: Home    Transfer via wheelchair to car    Transfer with Belongings    Transported by PACU staff    Medicines sent: None    Notified: Shira    Discharge instructions sent with patient and patient understands instructions. Instructed to call hospital for concerns, phone numbers given..    Patient awake, alert, and oriented x4  Denies pain or discomfort at this time.

## 2025-06-17 NOTE — DISCHARGE SUMMARY
[facility]  Discharge Note  Short Stay    Admit Date: 6/17/2025    Discharge Date and Time: 06/17/2025 11:26 AM      Attending Physician: Jodi Huang MD     Discharge Provider: Jodi Huang    Diagnoses:  Active Hospital Problems    Diagnosis  POA    *Gross hematuria [R31.0]  Yes      Resolved Hospital Problems   No resolved problems to display.       Discharged Condition: good    Hospital Course: Patient was admitted for cysto and tolerated the procedure well with no complications. The patient was discharged home in good condition on the same day.       Final Diagnoses: Same as principal problem.    Disposition: Home or Self Care    Follow up/Patient Instructions:    Medications:  Reconciled Home Medications:   Current Discharge Medication List        START taking these medications    Details   sulfamethoxazole-trimethoprim 800-160mg (BACTRIM DS) 800-160 mg Tab Take 1 tablet by mouth 2 (two) times daily. for 2 days  Qty: 4 tablet, Refills: 0           CONTINUE these medications which have NOT CHANGED    Details   ASHWAGANDHA ROOT EXTRACT ORAL Take by mouth.      hydroCHLOROthiazide (HYDRODIURIL) 25 MG tablet Take 1 tablet (25 mg total) by mouth once daily.  Qty: 90 tablet, Refills: 3    Comments: .      multivitamin (THERAGRAN) per tablet Take 1 tablet by mouth once daily.      NEXLETOL 180 mg Tab Take 1 tablet by mouth.      omeprazole (PRILOSEC) 40 MG capsule Take 1 capsule (40 mg total) by mouth 2 (two) times daily before meals.  Qty: 180 capsule, Refills: 3    Associated Diagnoses: Gastroesophageal reflux disease without esophagitis; Long-term current use of proton pump inhibitor therapy; Nausea      TURMERIC ORAL Take 1 tablet by mouth 2 (two) times a day.      zolpidem (AMBIEN) 5 MG Tab Take 1 tablet (5 mg total) by mouth nightly as needed (insomnia).  Qty: 30 tablet, Refills: 0    Associated Diagnoses: Insomnia, unspecified type      azelastine (OPTIVAR) 0.05 % ophthalmic solution  Place 1 drop into both eyes 2 (two) times daily.  Qty: 6 mL, Refills: 11      clobetasoL (TEMOVATE) 0.05 % cream Apply topically every evening. 6-12 weeks  Qty: 60 g, Refills: 2    Associated Diagnoses: Vaginal itching      hydrOXYzine pamoate (VISTARIL) 25 MG Cap Take 1 capsule (25 mg total) by mouth every 8 (eight) hours as needed (anxiety).  Qty: 90 capsule, Refills: 11    Associated Diagnoses: Anxiety; Weakness; Dizziness; Hypoglycemia      ibuprofen (ADVIL,MOTRIN) 200 MG tablet Take 400 mg by mouth every 6 (six) hours as needed for Pain.      meclizine (ANTIVERT) 25 mg tablet Take 1 tablet (25 mg total) by mouth 3 (three) times daily as needed for Dizziness.  Qty: 20 tablet, Refills: 0      ondansetron (ZOFRAN-ODT) 4 MG TbDL PLACE ONE TABLET ON TONGUE AND ALLOW TO DISINTEGRATE EVERY 6 HOURS AS NEEDED FOR NAUSEA *THANK YOU!*  Qty: 25 tablet, Refills: 3    Comments: This prescription was filled on 4/14/2025. Any refills authorized will be placed on file.      predniSONE (DELTASONE) 20 MG tablet Take two tablets every morning  Qty: 14 tablet, Refills: 0      traZODone (DESYREL) 50 MG tablet Take 1-2 tablets ( mg total) by mouth nightly as needed for Insomnia.  Qty: 60 tablet, Refills: 11    Associated Diagnoses: Insomnia, unspecified type      vitamin E 600 UNIT capsule Take 600 Units by mouth once daily.           Discharge Procedure Orders   Call MD for:  temperature >100.4     Activity as tolerated     Procedure Order to Urology   Standing Status: Future Standing Exp. Date: 06/17/26     Order Specific Question Answer Comments   Procedure TURBT 7/27   Facility Name: [facility]       Follow-up Information       Jodi Huang MD Follow up on 6/27/2025.    Specialty: Urology  Why: surgery  Contact information:  [address] [place] [address] [address]IVE  [address]  [facility] [address]  [phone]                               Jodi Huang MD  Urology Department

## 2025-06-17 NOTE — PROGRESS NOTES
Procedure Order to Urology [[id number]]    Electronically signed by: Jodi Huang MD on 06/17/25 1126 Status: Active   Ordering user: Jodi Huang MD 06/17/25 1126 Ordering provider: Jodi Huang MD   Authorized by: Jodi Huang MD Ordering mode: Standard   Frequency:  06/17/25 -     Diagnoses  Gross hematuria [R31.0]   Questionnaire    Question Answer   Procedure TURBT Comment - 7/27   Facility Name: [place] [facility]   Please order, out patient hospital, CBC, Bmp,U/A and Culture,  IV Start, NPO, General anesthesia, EKG is over 40 years old, Cipro 400mg IV cpt -[id number]

## 2025-06-24 ENCOUNTER — HOSPITAL ENCOUNTER (OUTPATIENT)
Dept: PREADMISSION TESTING | Facility: HOSPITAL | Age: 85
Discharge: HOME | End: 2025-06-24
Attending: STUDENT IN AN ORGANIZED HEALTH CARE EDUCATION/TRAINING PROGRAM
Payer: MEDICARE

## 2025-06-24 NOTE — OR NURSING
PAT done via phone. All preop instructions reviewed with verbalized understanding. Also sent to [employer], for review.

## 2025-06-26 ENCOUNTER — ANESTHESIA EVENT (OUTPATIENT)
Dept: SURGERY | Facility: HOSPITAL | Age: 85
End: 2025-06-26
Payer: MEDICARE

## 2025-06-27 ENCOUNTER — HOSPITAL ENCOUNTER (OUTPATIENT)
Facility: HOSPITAL | Age: 85
Discharge: HOME | End: 2025-06-27
Attending: STUDENT IN AN ORGANIZED HEALTH CARE EDUCATION/TRAINING PROGRAM | Admitting: STUDENT IN AN ORGANIZED HEALTH CARE EDUCATION/TRAINING PROGRAM
Payer: MEDICARE

## 2025-06-27 ENCOUNTER — ANESTHESIA (OUTPATIENT)
Dept: SURGERY | Facility: HOSPITAL | Age: 85
End: 2025-06-27
Payer: MEDICARE

## 2025-06-27 DIAGNOSIS — R31.0 GROSS HEMATURIA: Primary | ICD-10-CM

## 2025-06-27 PROCEDURE — 71000016 HC POSTOP RECOV ADDL HR: Performed by: STUDENT IN AN ORGANIZED HEALTH CARE EDUCATION/TRAINING PROGRAM

## 2025-06-27 PROCEDURE — 25000003 PHARM REV CODE 250: Performed by: STUDENT IN AN ORGANIZED HEALTH CARE EDUCATION/TRAINING PROGRAM

## 2025-06-27 PROCEDURE — 71000033 HC RECOVERY, INTIAL HOUR: Performed by: STUDENT IN AN ORGANIZED HEALTH CARE EDUCATION/TRAINING PROGRAM

## 2025-06-27 PROCEDURE — 36000706: Performed by: STUDENT IN AN ORGANIZED HEALTH CARE EDUCATION/TRAINING PROGRAM

## 2025-06-27 PROCEDURE — 63600175 PHARM REV CODE 636 W HCPCS: Performed by: ANESTHESIOLOGY

## 2025-06-27 PROCEDURE — 63600175 PHARM REV CODE 636 W HCPCS: Mod: JZ,TB | Performed by: STUDENT IN AN ORGANIZED HEALTH CARE EDUCATION/TRAINING PROGRAM

## 2025-06-27 PROCEDURE — 37000008 HC ANESTHESIA 1ST 15 MINUTES: Performed by: STUDENT IN AN ORGANIZED HEALTH CARE EDUCATION/TRAINING PROGRAM

## 2025-06-27 PROCEDURE — 99900031 HC PATIENT EDUCATION (STAT)

## 2025-06-27 PROCEDURE — 36000707: Performed by: STUDENT IN AN ORGANIZED HEALTH CARE EDUCATION/TRAINING PROGRAM

## 2025-06-27 PROCEDURE — 51720 TREATMENT OF BLADDER LESION: CPT | Mod: XE,,, | Performed by: STUDENT IN AN ORGANIZED HEALTH CARE EDUCATION/TRAINING PROGRAM

## 2025-06-27 PROCEDURE — 88307 TISSUE EXAM BY PATHOLOGIST: CPT | Mod: TC | Performed by: PATHOLOGY

## 2025-06-27 PROCEDURE — 63600175 PHARM REV CODE 636 W HCPCS: Performed by: NURSE ANESTHETIST, CERTIFIED REGISTERED

## 2025-06-27 PROCEDURE — 63600175 PHARM REV CODE 636 W HCPCS: Performed by: STUDENT IN AN ORGANIZED HEALTH CARE EDUCATION/TRAINING PROGRAM

## 2025-06-27 PROCEDURE — 27201423 OPTIME MED/SURG SUP & DEVICES STERILE SUPPLY: Performed by: STUDENT IN AN ORGANIZED HEALTH CARE EDUCATION/TRAINING PROGRAM

## 2025-06-27 PROCEDURE — 71000015 HC POSTOP RECOV 1ST HR: Performed by: STUDENT IN AN ORGANIZED HEALTH CARE EDUCATION/TRAINING PROGRAM

## 2025-06-27 PROCEDURE — 71000039 HC RECOVERY, EACH ADD'L HOUR: Performed by: STUDENT IN AN ORGANIZED HEALTH CARE EDUCATION/TRAINING PROGRAM

## 2025-06-27 PROCEDURE — 52234 CYSTOSCOPY AND TREATMENT: CPT | Mod: ,,, | Performed by: STUDENT IN AN ORGANIZED HEALTH CARE EDUCATION/TRAINING PROGRAM

## 2025-06-27 PROCEDURE — 37000009 HC ANESTHESIA EA ADD 15 MINS: Performed by: STUDENT IN AN ORGANIZED HEALTH CARE EDUCATION/TRAINING PROGRAM

## 2025-06-27 PROCEDURE — 25000003 PHARM REV CODE 250: Performed by: ANESTHESIOLOGY

## 2025-06-27 PROCEDURE — 94799 UNLISTED PULMONARY SVC/PX: CPT

## 2025-06-27 RX ORDER — OXYCODONE HYDROCHLORIDE 5 MG/1
5 TABLET ORAL
Status: DISCONTINUED | OUTPATIENT
Start: 2025-06-27 | End: 2025-06-27 | Stop reason: HOSPADM

## 2025-06-27 RX ORDER — ONDANSETRON 4 MG/1
8 TABLET, ORALLY DISINTEGRATING ORAL EVERY 8 HOURS PRN
Status: DISCONTINUED | OUTPATIENT
Start: 2025-06-27 | End: 2025-06-27 | Stop reason: HOSPADM

## 2025-06-27 RX ORDER — ACETAMINOPHEN 10 MG/ML
INJECTION, SOLUTION INTRAVENOUS
Status: DISCONTINUED | OUTPATIENT
Start: 2025-06-27 | End: 2025-06-27

## 2025-06-27 RX ORDER — MIDAZOLAM HYDROCHLORIDE 1 MG/ML
INJECTION INTRAMUSCULAR; INTRAVENOUS
Status: DISCONTINUED | OUTPATIENT
Start: 2025-06-27 | End: 2025-06-27

## 2025-06-27 RX ORDER — HYDROCODONE BITARTRATE AND ACETAMINOPHEN 5; 325 MG/1; MG/1
1 TABLET ORAL EVERY 4 HOURS PRN
Refills: 0 | Status: DISCONTINUED | OUTPATIENT
Start: 2025-06-27 | End: 2025-06-27 | Stop reason: HOSPADM

## 2025-06-27 RX ORDER — GLUCAGON 1 MG
1 KIT INJECTION
Status: DISCONTINUED | OUTPATIENT
Start: 2025-06-27 | End: 2025-06-27 | Stop reason: HOSPADM

## 2025-06-27 RX ORDER — PHENYLEPHRINE HYDROCHLORIDE 10 MG/ML
INJECTION INTRAVENOUS
Status: DISCONTINUED | OUTPATIENT
Start: 2025-06-27 | End: 2025-06-27

## 2025-06-27 RX ORDER — PHENAZOPYRIDINE HYDROCHLORIDE 100 MG/1
100 TABLET, FILM COATED ORAL 3 TIMES DAILY PRN
Qty: 30 TABLET | Refills: 0 | Status: SHIPPED | OUTPATIENT
Start: 2025-06-27 | End: 2025-07-07

## 2025-06-27 RX ORDER — TRAMADOL HYDROCHLORIDE 50 MG/1
50 TABLET, FILM COATED ORAL EVERY 6 HOURS PRN
Qty: 5 TABLET | Refills: 0 | Status: SHIPPED | OUTPATIENT
Start: 2025-06-27

## 2025-06-27 RX ORDER — PROPOFOL 10 MG/ML
VIAL (ML) INTRAVENOUS
Status: DISCONTINUED | OUTPATIENT
Start: 2025-06-27 | End: 2025-06-27

## 2025-06-27 RX ORDER — FENTANYL CITRATE 50 UG/ML
25 INJECTION, SOLUTION INTRAMUSCULAR; INTRAVENOUS EVERY 5 MIN PRN
Status: DISCONTINUED | OUTPATIENT
Start: 2025-06-27 | End: 2025-06-27 | Stop reason: HOSPADM

## 2025-06-27 RX ORDER — LIDOCAINE HYDROCHLORIDE 20 MG/ML
INJECTION, SOLUTION EPIDURAL; INFILTRATION; INTRACAUDAL; PERINEURAL
Status: DISCONTINUED | OUTPATIENT
Start: 2025-06-27 | End: 2025-06-27

## 2025-06-27 RX ORDER — HYOSCYAMINE SULFATE 0.125 MG
125 TABLET ORAL EVERY 4 HOURS PRN
Qty: 30 TABLET | Refills: 0 | Status: SHIPPED | OUTPATIENT
Start: 2025-06-27 | End: 2025-07-27

## 2025-06-27 RX ORDER — FENTANYL CITRATE 50 UG/ML
INJECTION, SOLUTION INTRAMUSCULAR; INTRAVENOUS
Status: DISCONTINUED | OUTPATIENT
Start: 2025-06-27 | End: 2025-06-27

## 2025-06-27 RX ORDER — HYDROMORPHONE HYDROCHLORIDE 2 MG/ML
0.2 INJECTION, SOLUTION INTRAMUSCULAR; INTRAVENOUS; SUBCUTANEOUS EVERY 5 MIN PRN
Status: DISCONTINUED | OUTPATIENT
Start: 2025-06-27 | End: 2025-06-27 | Stop reason: HOSPADM

## 2025-06-27 RX ORDER — PHENAZOPYRIDINE HYDROCHLORIDE 100 MG/1
100 TABLET, FILM COATED ORAL
Status: DISCONTINUED | OUTPATIENT
Start: 2025-06-27 | End: 2025-06-27

## 2025-06-27 RX ORDER — CEFAZOLIN SODIUM 1 G/3ML
INJECTION, POWDER, FOR SOLUTION INTRAMUSCULAR; INTRAVENOUS
Status: DISCONTINUED | OUTPATIENT
Start: 2025-06-27 | End: 2025-06-27

## 2025-06-27 RX ORDER — CIPROFLOXACIN 2 MG/ML
400 INJECTION, SOLUTION INTRAVENOUS
Status: COMPLETED | OUTPATIENT
Start: 2025-06-27 | End: 2025-06-27

## 2025-06-27 RX ORDER — DIPHENHYDRAMINE HYDROCHLORIDE 50 MG/ML
INJECTION, SOLUTION INTRAMUSCULAR; INTRAVENOUS
Status: DISCONTINUED | OUTPATIENT
Start: 2025-06-27 | End: 2025-06-27

## 2025-06-27 RX ORDER — ONDANSETRON HYDROCHLORIDE 2 MG/ML
4 INJECTION, SOLUTION INTRAVENOUS
Status: COMPLETED | OUTPATIENT
Start: 2025-06-27 | End: 2025-06-27

## 2025-06-27 RX ORDER — PHENAZOPYRIDINE HYDROCHLORIDE 200 MG/1
200 TABLET, FILM COATED ORAL
Status: DISCONTINUED | OUTPATIENT
Start: 2025-06-27 | End: 2025-06-27 | Stop reason: HOSPADM

## 2025-06-27 RX ORDER — DEXAMETHASONE SODIUM PHOSPHATE 4 MG/ML
INJECTION, SOLUTION INTRA-ARTICULAR; INTRALESIONAL; INTRAMUSCULAR; INTRAVENOUS; SOFT TISSUE
Status: DISCONTINUED | OUTPATIENT
Start: 2025-06-27 | End: 2025-06-27

## 2025-06-27 RX ORDER — LIDOCAINE HYDROCHLORIDE 10 MG/ML
1 INJECTION, SOLUTION EPIDURAL; INFILTRATION; INTRACAUDAL; PERINEURAL ONCE
Status: DISCONTINUED | OUTPATIENT
Start: 2025-06-27 | End: 2025-06-27 | Stop reason: HOSPADM

## 2025-06-27 RX ADMIN — DEXAMETHASONE SODIUM PHOSPHATE 8 MG: 4 INJECTION, SOLUTION INTRA-ARTICULAR; INTRALESIONAL; INTRAMUSCULAR; INTRAVENOUS; SOFT TISSUE at 09:06

## 2025-06-27 RX ADMIN — DIPHENHYDRAMINE HYDROCHLORIDE 25 MG: 50 INJECTION INTRAMUSCULAR; INTRAVENOUS at 09:06

## 2025-06-27 RX ADMIN — MIDAZOLAM HYDROCHLORIDE 2 MG: 1 INJECTION, SOLUTION INTRAMUSCULAR; INTRAVENOUS at 09:06

## 2025-06-27 RX ADMIN — SODIUM CHLORIDE, SODIUM GLUCONATE, SODIUM ACETATE, POTASSIUM CHLORIDE AND MAGNESIUM CHLORIDE: 526; 502; 368; 37; 30 INJECTION, SOLUTION INTRAVENOUS at 07:06

## 2025-06-27 RX ADMIN — FENTANYL CITRATE 25 MCG: 50 INJECTION INTRAMUSCULAR; INTRAVENOUS at 10:06

## 2025-06-27 RX ADMIN — CEFAZOLIN 2 G: 1 INJECTION, POWDER, FOR SOLUTION INTRAVENOUS at 09:06

## 2025-06-27 RX ADMIN — CIPROFLOXACIN 400 MG: 2 INJECTION INTRAVENOUS at 09:06

## 2025-06-27 RX ADMIN — LIDOCAINE HYDROCHLORIDE 100 MG: 20 INJECTION, SOLUTION EPIDURAL; INFILTRATION; INTRACAUDAL at 09:06

## 2025-06-27 RX ADMIN — SODIUM CHLORIDE 40 MG: 9 INJECTION, SOLUTION INTRAVENOUS at 10:06

## 2025-06-27 RX ADMIN — PHENYLEPHRINE HYDROCHLORIDE 100 MCG: 10 INJECTION INTRAVENOUS at 09:06

## 2025-06-27 RX ADMIN — ONDANSETRON 4 MG: 2 INJECTION INTRAMUSCULAR; INTRAVENOUS at 08:06

## 2025-06-27 RX ADMIN — PHENAZOPYRIDINE 200 MG: 200 TABLET ORAL at 10:06

## 2025-06-27 RX ADMIN — OXYCODONE HYDROCHLORIDE 5 MG: 5 TABLET ORAL at 10:06

## 2025-06-27 RX ADMIN — FENTANYL CITRATE 25 MCG: 50 INJECTION, SOLUTION INTRAMUSCULAR; INTRAVENOUS at 09:06

## 2025-06-27 RX ADMIN — PROPOFOL 130 MG: 10 INJECTION, EMULSION INTRAVENOUS at 09:06

## 2025-06-27 RX ADMIN — FENTANYL CITRATE 25 MCG: 50 INJECTION INTRAMUSCULAR; INTRAVENOUS at 11:06

## 2025-06-27 RX ADMIN — ACETAMINOPHEN 1000 MG: 10 INJECTION INTRAVENOUS at 09:06

## 2025-06-27 NOTE — PLAN OF CARE
Demonstrated to pt and friend how to empty catheter and change to a leg bag. Both verbalized understanding. Discharge instructions given to pt and family/friend, verbalized understanding and questions answered. Handouts provided. Belongings given back to pt. IV removed- catheter intact. Discharge via wheelchair.

## 2025-06-27 NOTE — ANESTHESIA PREPROCEDURE EVALUATION
06/27/2025  Liliana Hermosillo is a 85 y.o., female.      Pre-op Assessment    I have reviewed the Patient Summary Reports.     I have reviewed the Nursing Notes. I have reviewed the NPO Status.   I have reviewed the Medications.     Review of Systems  Anesthesia Hx:             Denies Family Hx of Anesthesia complications.    Denies Personal Hx of Anesthesia complications.                    Cardiovascular:     Hypertension    Dysrhythmias atrial fibrillation         ECG has been reviewed. RBBB, PAF                           Renal/:  Chronic Renal Disease, CKD                Hepatic/GI:    Hiatal Hernia, GERD Liver Disease,  [place]             Musculoskeletal:         Spine Disorders: lumbar Chronic Pain               Physical Exam  General: Well nourished, Cooperative, Alert and Oriented    Airway:  Mallampati: III / II  Mouth Opening: Small, but > 3cm  Neck ROM: Extension Decreased, Flexion Decreased, Left Lateral Motion Decreased, Right Lateral Motion Decreased    Dental:  Dentures    Chest/Lungs:  Normal Respiratory Rate    Heart:  Rate: Normal  Rhythm: Regular Rhythm        Anesthesia Plan  Type of Anesthesia, risks & benefits discussed:    Anesthesia Type: Gen Supraglottic Airway, Gen ETT  Intra-op Monitoring Plan: Standard ASA Monitors  Post Op Pain Control Plan: multimodal analgesia  Induction:  IV  Airway Plan: , Post-Induction  ASA Score: 3    Ready For Surgery From Anesthesia Perspective.     .

## 2025-06-27 NOTE — RESPIRATORY THERAPY
06/27/25 0717   Patient Assessment/Suction   Level of Consciousness (AVPU) alert   Respiratory Effort Normal;Unlabored   Expansion/Accessory Muscles/Retractions no use of accessory muscles   Rhythm/Pattern, Respiratory pattern regular   Cough Frequency no cough   PRE-TX-O2   Device (Oxygen Therapy) room air   Incentive Spirometer   $ Incentive Spirometer Charges preop instruction   Incentive Spirometer Predicted Level (mL) 1520   Administration (IS) instruction provided, initial   Number of Repetitions (IS) 5   Level Incentive Spirometer (mL) 1500   Patient Tolerance (IS) no adverse signs/symptoms present   Equipment Change   $ RT Equipment incentive spirometer   Education   $ Education Incentive Spirometry;15 min

## 2025-06-27 NOTE — PLAN OF CARE
AAO x4. NAD. VSS. Calm and cooperative. Speech appropriate. Tolerating clear liquids. Denies nausea. Pain controlled. 20G IV to Rt FA SL with dressing CDI. Jara bag changed and old one disposed of per protocol. Jara draining well. Family notified of patient status. All safety measures taken. Bed in low position. Bedrails up x2. Bed locked. All patient belongings in post op. Cleared by anesthesia for phase 2.

## 2025-06-27 NOTE — PLAN OF CARE
Pt prepped for surgery. Consents at bedside. Incentive spirometry taught by respiratory. Pt belongings placed in postop cabinet. Friend set up with text alerts.

## 2025-06-27 NOTE — ANESTHESIA POSTPROCEDURE EVALUATION
Anesthesia Post Evaluation    Patient: Liliana Hermosillo    Procedure(s) Performed: Procedure(s) (LRB):  TURBT (TRANSURETHRAL RESECTION OF BLADDER TUMOR) (N/A)    Final Anesthesia Type: general      Patient location during evaluation: PACU  Patient participation: Yes- Able to Participate  Level of consciousness: awake and alert  Post-procedure vital signs: reviewed and stable  Pain management: adequate  Airway patency: patent    PONV status at discharge: No PONV  Anesthetic complications: no      Cardiovascular status: blood pressure returned to baseline  Respiratory status: unassisted  Hydration status: euvolemic  Follow-up not needed.              Vitals Value Taken Time   /62 06/27/25 11:37   Temp 36.7 °C (98.1 °F) 06/27/25 11:37   Pulse 75 06/27/25 11:37   Resp 12 06/27/25 11:37   SpO2 97 % 06/27/25 11:37         Event Time   Out of Recovery 11:37:00         Pain/David Score: Pain Rating Prior to Med Admin: 7 (6/27/2025 11:00 AM)  Pain Rating Post Med Admin: 4 (6/27/2025 11:15 AM)  David Score: 10 (6/27/2025 11:30 AM)  Modified David Score: 20 (6/27/2025 11:37 AM)

## 2025-06-27 NOTE — H&P
[facility]  Group: Clyde/Marcella/Sal/Asa  NPs: Gely Juárez/Malini Cassidy    Note today written by:Jodi Huang  Date of Service: 06/27/2025      CHIEF COMPLAINT: Gross hematuria.    PRESENTING ILLNESS:    Liliana Hermosillo is a 85 y.o. female who presents for gross hematuria. Last clinic visit was 8/25/22.    Found on cysto to have a small papillary tumor.       5/20/25  Pt states gross hematuria occurred last week, 3 episodes total.   No gross hematuria for the past 3 days  Denies dysuria or flank pain with hematuria  Seen by PCP 5/13/25 and urine obtained:  POCT UA sm bili, protein 30, tr WBC  Urine culture no growth  Urine cytology:  Negative for high grade urothelial carcinoma - Reactive urothelial cells                Crystals present, Degenerating cells      No voiding complaints as baseline    Hx of kidney stone at age 32. Surgery to remove kidney stone. No issues since    Hx of hysterectomy    History of recurrent UTI: denies  Personal or family hx of  malignancy: denies  History of  trauma: denies  Smoking history: former, quit 1985    Urine cultures:   Lab Results   Component Value Date    LABURIN No Significant Growth 05/20/2025    LABURIN No Significant Growth 05/13/2025    LABURIN >100,000 cfu/ml Pseudomonas aeruginosa (A) 04/24/2025    LABURIN (A) 04/24/2025     >100,000 cfu/ml Coagulase-negative Staphylococcus species    LABURIN No growth 08/25/2022         REVIEW OF SYSTEMS: as stated above in hpi    PATIENT HISTORY:  Past Medical History:   Diagnosis Date    Atypical ductal hyperplasia, breast     Chronic constipation     Diverticulitis     Diverticulosis     Edema     Hypertension     Mitral valve prolapse     Ovarian cyst        Past Surgical History:   Procedure Laterality Date    ANKLE ARTHROSCOPY W/ OPEN REPAIR Left     APPENDECTOMY  1972    BREAST BIOPSY Left     CHOLECYSTECTOMY      COLONOSCOPY      CYSTOSCOPY N/A  06/17/2025    Procedure: CYSTOSCOPY;  Surgeon: Jodi Huang MD;  Location: [facility] OR;  Service: Urology;  Laterality: N/A;    EPIDURAL STEROID INJECTION N/A 07/06/2022    Procedure: Injection, Steroid, Epidural NAIDA C7-T1;  Surgeon: Kobe Rodríguez MD;  Location: [facility];  Service: Pain Management;  Laterality: N/A;  Medicare    EPIDURAL STEROID INJECTION INTO CERVICAL SPINE N/A 03/30/2022    Procedure: Injection-steroid-epidural-cervical;  Surgeon: Kobe Rodríguez MD;  Location: [facility] OR;  Service: Pain Management;  Laterality: N/A;  c7-t1    ESOPHAGOGASTRODUODENOSCOPY N/A 07/02/2024    Procedure: EGD (ESOPHAGOGASTRODUODENOSCOPY);  Surgeon: Indio Galvez MD;  Location: [facility];  Service: Endoscopy;  Laterality: N/A;    EYE SURGERY  2012    HIP SURGERY Right     4 screws in hip    HYSTERECTOMY      INJECTION OF NERVE Bilateral 09/07/2022    Procedure: INJECTION, NERVE;  Surgeon: Kobe Rodríguez MD;  Location: [facility] OR;  Service: Anesthesiology;  Laterality: Bilateral;  C2,3 Mbb and TON block    INJECTION OF NERVE Bilateral 10/12/2022    Procedure: INJECTION, NERVE;  Surgeon: Kobe Rodríguez MD;  Location: [facility] OR;  Service: Anesthesiology;  Laterality: Bilateral;  C2, C3 and TON  MBB    KNEE SURGERY      RADIOFREQUENCY ABLATION Bilateral 11/09/2022    Procedure: Radiofrequency Ablation C2-C3 and TON;  Surgeon: Kobe Rodríguez MD;  Location: [facility];  Service: Pain Management;  Laterality: Bilateral;    TONSILLECTOMY  1948    UPPER GASTROINTESTINAL ENDOSCOPY         Allergies:  Gabapentin, Monosodium glutamate, and Pantoprazole      PHYSICAL EXAMINATION:  Constitutional: She is oriented to person, place, and time. She appears well-developed and well-nourished.  She is in no apparent distress.  Abdominal:  She exhibits no distension.  There is no CVA tenderness.   Neurological: She is alert and oriented to person, place, and time.   Psych: Cooperative with normal affect.        Physical  Exam      LABS:      Lab Results   Component Value Date    CREATININE 1.0 05/30/2025     Lab Results   Component Value Date    HGBA1C 5.6 05/05/2025         IMPRESSION:    Encounter Diagnoses   Name Primary?    Gross hematuria        PLAN:  - TURBT today   - The risks and benefits of resection of a bladder tumor were discussed with the patient in detail.  The risks include but are not limited to burning with urination, bleeding, infection, pain, incomplete removal of bladder tumor, no guarantee of cancer cure, injury to the urinary tract with possible need for further procedures. We discussed the possible need for a ureteral stent and its morbidity. She understands the possible need for an indwelling catheter post-operatively. Post-operative intravesical chemotherapy may also be used, and we discussed the risks and benefits of this medication. She was given the chance to ask questions and has elected to proceed with TURBT.        Jodi Huang MD  Urology Department

## 2025-06-27 NOTE — OP NOTE
[facility]  Operative Note    Date: 06/27/2025    Pre-Op Diagnosis: bladder tumor    Post-Op Diagnosis: same    Procedure(s) Performed:   1.  TURBT of small sized bladder tumor (1 cm)  2.  Intravesical instillation of chemotherapeutic agent    Specimen(s): bladder tumor    Staff Surgeon:  Jodi Huang MD    Anesthesia: LMA    Indications: Liliana Hermosillo is a 85 y.o. female with a bladder tumor.  She presents today for surgical resection.      Findings:   1.  Small 1 cm tumor on the left bladder floor/lateral wall   2.  Mitomycin C given in PACU    Estimated Blood Loss: min    Drains: 16 Fr gasca catheter    Procedure in detail:  After the risks, benefits and possible complications of the procedure were explained, consents were obtained. The patient was taken to the operating room and placed under anesthesia. Pre-operative antibiotics were administered 30 minutes prior to expected start time. The patient was placed in the dorsal lithotomy position and prepped and draped in the normal and sterile fashion. Time out was performed.      The resectoscope was assembled with the visual obturator. This was placed into the bladder via the urethra and the visual obturator was exchanged for the resecting mechanism.  The tumor was then resected in a single swipe.  Specimens were then removed and passed off the field for pathologic analysis.      The bladder was drained and hemostasis was achieved.  The resectoscope was removed.  A 16 Fr gasca catheter was placed with 10 cc in the balloon.  The bladder was then irrigated.    The patient tolerated the procedure well and was transferred to recovery in stable condition.    Mitomycin C was then instilled in PACU and left in place for 45 mins.     Disposition:  Will call patient with path results. Gasca can be removed on Monday, 6/30.    Jodi Huang MD

## 2025-06-27 NOTE — TRANSFER OF CARE
"Anesthesia Transfer of Care Note    Patient: Liliana Hermosillo    Procedure(s) Performed: Procedure(s) (LRB):  TURBT (TRANSURETHRAL RESECTION OF BLADDER TUMOR) (N/A)    Patient location: PACU    Anesthesia Type: general    Transport from OR: Transported from OR on 2-3 L/min O2 by NC with adequate spontaneous ventilation    Post pain: adequate analgesia    Post assessment: no apparent anesthetic complications and tolerated procedure well    Post vital signs: stable    Level of consciousness: sedated and responds to stimulation    Nausea/Vomiting: no nausea/vomiting    Complications: none    Transfer of care protocol was followed      Last vitals: Visit Vitals  BP (!) 141/65 (BP Location: Right arm, Patient Position: Lying)   Pulse 68   Temp 36.6 °C (97.9 °F) (Skin)   Resp 16   Ht 5' 3" (1.6 m)   Wt 63 kg (139 lb)   SpO2 99%   Breastfeeding No   BMI 24.62 kg/m²     "

## 2025-06-27 NOTE — DISCHARGE INSTRUCTIONS
Post Cystoscopy and Transurethral resection of Bladder Tumor Instructions  Do not strain to have a bowel movement  No strenuous exercise x 7 days  No driving while you are on narcotic pain medications or if your gasca  catheter is in place    You can expect:  To see blood in your urine.    Go to the ER if:  Your catheter stops draining  You are having severe abdominal pain  Inability to void if you do not have a catheter    Call the doctor if:  Temperature is greater than 101F  Persistent vomiting and inability to keep food down        General Information:    1.  Do not drink alcoholic beverages including beer for 24 hours or as long as you are on pain medication..  2.  Do not drive a motor vehicle, operate machinery or power tools, or signs legal papers for 24 hours or as long as you are on pain medication.   3.  You may experience light-headedness, dizziness, and sleepiness following surgery. Please do not stay alone. A responsible adult should be with you for this 24 hour period.  4.  Go home and rest.    5. Progress slowly to a normal diet unless instructed.  Otherwise, begin with liquids such as soft drinks, then soup and crackers working up to solid foods. Drink plenty of nonalcoholic fluids.  6.  Certain anesthetics and pain medications produce nausea and vomiting in certain       individuals. If nausea becomes a problem at home, call you doctor.    7. A nurse will be calling you sometime after surgery. Do not be alarmed. This is our way of finding out how you are doing.    8. Several times every hour while you are awake, take 2-3 deep breaths and cough. If you had stomach surgery hold a pillow or rolled towel firmly against your stomach before you cough. This will help with any pain the cough might cause.  9. Several times every hour while you are awake, pump and flex your feet 5-6 times and do foot circles. This will help prevent blood clots.    10.Call your doctor for severe pain, bleeding, fever, or  signs or symptoms of infection (pain, swelling, redness, foul odor, drainage).      Post op instructions for prevention of DVT  What is deep vein thrombosis?  Deep vein thrombosis (DVT) is the medical term for blood clots in the deep veins of the leg.  These blood clots can be dangerous.  A DVT can block a blood vessel and keep blood from getting where it needs to go.  Another problem is that the clot can travel to other parts of the body such as the lungs.  A clot that travels to the lungs is called a pulmonary embolus (PE) and can cause serious problems with breathing which can lead to death.  Am I at risk for DVT/PE?  If you are not very active, you are at risk of DVT.  Anyone confined to bed, sitting for long periods of time, recovering from surgery, etc. increases the risk of DVT.  Other risk factors are cancer diagnosis, certain medications, estrogen replacement in any form,older age, obesity, pregnancy, smoking, history of clotting disorders, and dehydration.  How will I know if I have a DVT?  Swelling in the lower leg  Pain  Warmth, redness, hardness or bulging of the vein  If you have any of these symptoms, call your doctors office right away.  Some people will not have any symptoms until the clot moves to the lungs.  What are the symptoms of a PE?  Panting, shortness of breath, or trouble breathing  Sharp, knife-like chest pain when you breathe  Coughing or coughing up blood  Rapid heartbeat  If you have any of these symptoms or get worse quickly, call 911 for emergency treatment.  How can I prevent a DVT?  Avoid long periods of inactivity and dont cross your legs--get up and walk around every hour or so.  Stay active--walking after surgery is highly encouraged.  This means you should get out of the house and walk in the neighborhood.  Going up and down stairs will not impair healing (unless advised against such activity by your doctor).    Drink plenty of noncaffeinated, nonalcoholic fluids each day to  prevent dehydration.  Wear special support stockings, if they have been advised by your doctor.  If you travel, stop at least once an hour and walk around.  Avoid smoking (assistance with stopping is available through your healthcare provider)  Always notify your doctor if you are not able to follow the post operative instructions that are given to you at the time of discharge.  It may be necessary to prescribe one of the medications available to prevent DVT.      Using an Incentive Spirometer    An incentive spirometer is a device that helps you do deep breathing exercises. These exercises expand your lungs, aid in circulation, and help prevent pneumonia. Deep breathing exercises also help you breathe better and improve the function of your lungs by:  Keeping your lungs clear  Strengthening your breathing muscles  Helping prevent respiratory complications or problems  The incentive spirometer gives you a way to take an active part in recover. A nurse or therapist will teach you breathing exercises. To do these exercises, you will breathe in through your mouth and not your nose. The incentive spirometer only works correctly if you breathe in through your mouth.  Steps to clear lungs  Step 1. Exhale normally. Then, inhale normally.  Relax and breathe out.  Step 2. Place your lips tightly around the mouthpiece.  Make sure the device is upright and not tilted.  Step 3. Inhale as much air as you can through the mouthpiece (don't breath through your nose).  Inhale slowly and deeply.  Hold your breath long enough to keep the balls or disk raised for at least 3 to 5 seconds, or as instructed by your healthcare provider.  Some spirometers have an indicator to let you know that you are breathing in too fast. If the indicator goes off, breathe in more slowly.  Step 4. Repeat the exercise regularly.  Do this exercise every hour while you're awake, or as instructed by your healthcare provider.  If you were taught deep breathing  and coughing exercises, do them regularly as instructed by your healthcare provider.       We hope your stay was comfortable as you heal now, mend and rest.    For we have enjoyed taking care of you by giving your our best.    And as you get better, by regaining your health and strength;   We count it as a privilege to have served you and hope your time at [facility] was well spent.      Thank  You!!!

## 2025-06-27 NOTE — DISCHARGE SUMMARY
[facility]  Discharge Note  Short Stay    Admit Date: 6/27/2025    Discharge Date and Time: 06/27/2025 9:50 AM      Attending Physician: Jodi Huang MD     Discharge Provider: Jodi Huang    Diagnoses:  Active Hospital Problems    Diagnosis  POA    *Gross hematuria [R31.0]  Yes      Resolved Hospital Problems   No resolved problems to display.       Discharged Condition: good    Hospital Course: Patient was admitted for outpatient procedure and tolerated the procedure well with no complications. The patient was discharged home in good condition on the same day.       Final Diagnoses: Same as principal problem.    Disposition: Home or Self Care    Follow up/Patient Instructions:    Medications:  Reconciled Home Medications:   Current Discharge Medication List        START taking these medications    Details   hyoscyamine (ANASPAZ,LEVSIN) 0.125 mg Tab Take 1 tablet (125 mcg total) by mouth every 4 (four) hours as needed.  Qty: 30 tablet, Refills: 0      phenazopyridine (PYRIDIUM) 100 MG tablet Take 1 tablet (100 mg total) by mouth 3 (three) times daily as needed.  Qty: 30 tablet, Refills: 0      traMADoL (ULTRAM) 50 mg tablet Take 1 tablet (50 mg total) by mouth every 6 (six) hours as needed for Pain.  Qty: 5 tablet, Refills: 0    Comments: n/a   Associated Diagnoses: Gross hematuria           CONTINUE these medications which have NOT CHANGED    Details   ASHWAGANDHA ROOT EXTRACT ORAL Take by mouth.      hydroCHLOROthiazide (HYDRODIURIL) 25 MG tablet Take 1 tablet (25 mg total) by mouth once daily.  Qty: 90 tablet, Refills: 3    Comments: .      ibuprofen (ADVIL,MOTRIN) 200 MG tablet Take 400 mg by mouth every 6 (six) hours as needed for Pain.      meclizine (ANTIVERT) 25 mg tablet Take 1 tablet (25 mg total) by mouth 3 (three) times daily as needed for Dizziness.  Qty: 20 tablet, Refills: 0      multivitamin (THERAGRAN) per tablet Take 1 tablet by mouth once daily.      NEXLETOL 180 mg Tab  Take 1 tablet by mouth.      omeprazole (PRILOSEC) 40 MG capsule Take 1 capsule (40 mg total) by mouth 2 (two) times daily before meals.  Qty: 180 capsule, Refills: 3    Associated Diagnoses: Gastroesophageal reflux disease without esophagitis; Long-term current use of proton pump inhibitor therapy; Nausea      TURMERIC ORAL Take 1 tablet by mouth 2 (two) times a day.      vitamin E 600 UNIT capsule Take 600 Units by mouth once daily.      zolpidem (AMBIEN) 5 MG Tab Take 1 tablet (5 mg total) by mouth nightly as needed (insomnia).  Qty: 30 tablet, Refills: 0    Associated Diagnoses: Insomnia, unspecified type      azelastine (OPTIVAR) 0.05 % ophthalmic solution Place 1 drop into both eyes 2 (two) times daily.  Qty: 6 mL, Refills: 11      clobetasoL (TEMOVATE) 0.05 % cream Apply topically every evening. 6-12 weeks  Qty: 60 g, Refills: 2    Associated Diagnoses: Vaginal itching      hydrOXYzine pamoate (VISTARIL) 25 MG Cap Take 1 capsule (25 mg total) by mouth every 8 (eight) hours as needed (anxiety).  Qty: 90 capsule, Refills: 11    Associated Diagnoses: Anxiety; Weakness; Dizziness; Hypoglycemia      ondansetron (ZOFRAN-ODT) 4 MG TbDL PLACE ONE TABLET ON TONGUE AND ALLOW TO DISINTEGRATE EVERY 6 HOURS AS NEEDED FOR NAUSEA *THANK YOU!*  Qty: 25 tablet, Refills: 3    Comments: This prescription was filled on 4/14/2025. Any refills authorized will be placed on file.      predniSONE (DELTASONE) 20 MG tablet Take two tablets every morning  Qty: 14 tablet, Refills: 0      traZODone (DESYREL) 50 MG tablet Take 1-2 tablets ( mg total) by mouth nightly as needed for Insomnia.  Qty: 60 tablet, Refills: 11    Associated Diagnoses: Insomnia, unspecified type           Discharge Procedure Orders   Diet general     Call MD for:  temperature >100.4     Call MD for:  persistent nausea and vomiting     Call MD for:  severe uncontrolled pain     No dressing needed     Activity as tolerated      Follow-up Information        Jodi Huang MD. Call.    Specialty: Urology  Why: with path results  Contact information:  [address] [place] [address] [address]IVE  [address]  [facility] [address] [phone]                             Jodi Huang MD  Urology Department

## 2025-06-30 ENCOUNTER — RESULTS FOLLOW-UP (OUTPATIENT)
Dept: UROLOGY | Facility: CLINIC | Age: 85
End: 2025-06-30

## 2025-06-30 VITALS
WEIGHT: 139 LBS | HEART RATE: 75 BPM | SYSTOLIC BLOOD PRESSURE: 132 MMHG | HEIGHT: 63 IN | RESPIRATION RATE: 12 BRPM | TEMPERATURE: 98 F | DIASTOLIC BLOOD PRESSURE: 62 MMHG | OXYGEN SATURATION: 97 % | BODY MASS INDEX: 24.63 KG/M2

## 2025-06-30 DIAGNOSIS — C67.2 MALIGNANT NEOPLASM OF LATERAL WALL OF URINARY BLADDER: Primary | ICD-10-CM

## 2025-06-30 NOTE — PROGRESS NOTES
Procedure Order to Urology [[id number]]    Electronically signed by: Jodi Huang MD on 06/30/25 1420 Status: Active   Ordering user: Jodi Huang MD 06/30/25 1420 Authorized by: Jodi Huang MD   Ordering mode: Standard   Frequency:  06/30/25 -     Diagnoses  Malignant neoplasm of lateral wall of urinary bladder [C67.2]   Questionnaire    Question Answer   Procedure Cystoscopy Comment - 9/16   Facility Name: [facility]   [facility], Please order Urine POCT without micro . Local sedation (no urine Dr Bray or Dr huang)

## 2025-08-04 ENCOUNTER — OFFICE VISIT (OUTPATIENT)
Dept: FAMILY MEDICINE | Facility: CLINIC | Age: 85
End: 2025-08-04
Payer: MEDICARE

## 2025-08-04 VITALS
HEART RATE: 70 BPM | OXYGEN SATURATION: 96 % | SYSTOLIC BLOOD PRESSURE: 118 MMHG | WEIGHT: 137.81 LBS | BODY MASS INDEX: 24.42 KG/M2 | DIASTOLIC BLOOD PRESSURE: 64 MMHG | HEIGHT: 63 IN | RESPIRATION RATE: 14 BRPM

## 2025-08-04 DIAGNOSIS — R39.9 UTI SYMPTOMS: Primary | ICD-10-CM

## 2025-08-04 DIAGNOSIS — C67.2 MALIGNANT NEOPLASM OF LATERAL WALL OF URINARY BLADDER: ICD-10-CM

## 2025-08-04 DIAGNOSIS — N89.8 VAGINAL LESION: ICD-10-CM

## 2025-08-04 LAB
BILIRUBIN, UA POC OHS: ABNORMAL
BLOOD, UA POC OHS: NEGATIVE
CLARITY, UA POC OHS: ABNORMAL
COLOR, UA POC OHS: YELLOW
GLUCOSE, UA POC OHS: NEGATIVE
KETONES, UA POC OHS: NEGATIVE
LEUKOCYTES, UA POC OHS: ABNORMAL
NITRITE, UA POC OHS: NEGATIVE
PH, UA POC OHS: 5.5
PROTEIN, UA POC OHS: 30
SPECIFIC GRAVITY, UA POC OHS: >=1.03
UROBILINOGEN, UA POC OHS: 1

## 2025-08-04 PROCEDURE — 87086 URINE CULTURE/COLONY COUNT: CPT | Performed by: NURSE PRACTITIONER

## 2025-08-04 PROCEDURE — 99214 OFFICE O/P EST MOD 30 MIN: CPT | Mod: S$PBB,,, | Performed by: NURSE PRACTITIONER

## 2025-08-04 PROCEDURE — 99999PBSHW POCT URINALYSIS(INSTRUMENT): Mod: PBBFAC,,,

## 2025-08-04 PROCEDURE — 99214 OFFICE O/P EST MOD 30 MIN: CPT | Mod: PBBFAC,PN | Performed by: NURSE PRACTITIONER

## 2025-08-04 PROCEDURE — 81003 URINALYSIS AUTO W/O SCOPE: CPT | Mod: PBBFAC,PN | Performed by: NURSE PRACTITIONER

## 2025-08-04 PROCEDURE — 99999 PR PBB SHADOW E&M-EST. PATIENT-LVL IV: CPT | Mod: PBBFAC,,, | Performed by: NURSE PRACTITIONER

## 2025-08-04 RX ORDER — PREDNISOLONE ACETATE 10 MG/ML
SUSPENSION/ DROPS OPHTHALMIC
COMMUNITY
Start: 2025-06-23

## 2025-08-04 NOTE — PROGRESS NOTES
To Subjective:       Patient ID: Liliana Hermosillo is a 85 y.o. female.    Chief Complaint: Urinary Tract Infection (Burning sensation while urinating, ongoing for roughly a week.)    Liliana Hermosillo presents to the clinic today for concerns of a UTI       Under the care of the following:  PCP: Dr. Matias  Urology: ZANDER Juárez  Gastroenterology: INGRID Brasher  Otolaryngologist: Dr. Jaramillo  GYN: Dr. Cantu/ ZARINA MCCARTY  Urology: Dr. Huang     Patient Active Problem List  Diagnosis  ·Closed right hip fracture  ·Hypertension  ·Dyslipidemia  ·Gastroesophageal reflux disease  ·Sciatica of right side  ·Family history of colon cancer  ·Family history of lung cancer  ·Vertebral compression fracture  ·Age-related osteoporosis without current pathological fracture  ·Constipation  ·Diverticulosis  ·Multiple thyroid nodules  ·Advice given about COVID-19 virus infection  ·Stiffness of cervical spine  ·Neck pain, musculoskeletal  ·Gait instability  ·Abdominal pain  ·Hiatal hernia  ·Fatty liver  ·Paroxysmal atrial fibrillation  ·Aortic atherosclerosis  ·Insomnia  ·Degenerative disease of nervous system, unspecified  ·Stage 3a chronic kidney disease       history of hysterectomy  History of renal stone x1 in her twenties  Hx of hematuria   Underwent cystoscopy with Dr. Huang 6/17/2025  Underwent TURBT 6/30/2025   Low grade Ta urothelial carcinoma present on path specimen. Low risk.   Scheduled for repeat cystoscopy 9/16 at [place].     Reports burning with urination   Started 1 week ago    Reports burning sensation to vaginal area.   Hx of dryness, was on HRT in the past, uses a cream nightly- reports only applies externally  Felt an internal discomfort- reports felt a sore like area to inner left vaginal area   Reports she did have to apply slight pressure to feel and the area of concern was about the size of a BB  Denies any vaginal discharge, vaginal bleeding         Review of Systems    Problem List[1]    Objective:     "  Physical Exam  Vitals and nursing note reviewed.   Constitutional:       General: She is not in acute distress.     Appearance: Normal appearance. She is not ill-appearing.   Eyes:      Conjunctiva/sclera: Conjunctivae normal.   Cardiovascular:      Rate and Rhythm: Normal rate.   Pulmonary:      Effort: Pulmonary effort is normal. No respiratory distress.   Abdominal:      General: Bowel sounds are normal. There is no distension.      Tenderness: There is no abdominal tenderness.   Skin:     General: Skin is warm and dry.      Coloration: Skin is not jaundiced.      Findings: No rash.   Neurological:      Mental Status: She is alert.      Comments: Ambulating with walker     Psychiatric:         Mood and Affect: Mood normal.         Behavior: Behavior normal.         Lab Results   Component Value Date    WBC 5.11 05/05/2025    HGB 13.6 05/05/2025    HCT 42.0 05/05/2025     05/05/2025    CHOL 229 (H) 05/05/2025    TRIG 169 (H) 05/05/2025    HDL 58 05/05/2025    ALT 23 05/13/2025    AST 26 05/13/2025     05/13/2025    K 3.7 05/13/2025     05/13/2025    CREATININE 1.0 05/30/2025    BUN 21 05/13/2025    CO2 28 05/13/2025    TSH 2.538 05/05/2025    INR 0.9 06/30/2014    HGBA1C 5.6 05/05/2025     The ASCVD Risk score (Angelica DK, et al., 2019) failed to calculate for the following reasons:    The 2019 ASCVD risk score is only valid for ages 40 to 79  Visit Vitals  /64 (BP Location: Left arm, Patient Position: Sitting)   Pulse 70   Resp 14   Ht 5' 3" (1.6 m)   Wt 62.5 kg (137 lb 12.8 oz)   SpO2 96%   BMI 24.41 kg/m²      Assessment:       1. UTI symptoms    2. Malignant neoplasm of lateral wall of urinary bladder    3. Vaginal lesion        Plan:       1. UTI symptoms  -     POCT Urinalysis(Instrument)  -     Urine Culture High Risk ($$)  Discussed UA results with patient, will send for culture   Maintain fluids   2. Malignant neoplasm of lateral wall of urinary bladder  -     Urine Culture High " Risk ($$)  Keep scheduled follow up appointments with Urology   3. Vaginal lesion  -     Ambulatory referral/consult to Gynecology; Future; Expected date: 08/11/2025  Continue topical  Warm compresses, sitz bath for discomfort   Recommend GYN follow up      No follow-ups on file.      Future Appointments       Date Provider Specialty Appt Notes    8/7/2025 Thea Julian, [facility] Obstetrics and Gynecology referral    10/2/2025 Keara Matias MD Family Medicine 6m f/u                  [1]   Patient Active Problem List  Diagnosis    Closed right hip fracture    Hypertension    Dyslipidemia    Gastroesophageal reflux disease    Sciatica of right side    Family history of colon cancer    Family history of lung cancer    Vertebral compression fracture    Age-related osteoporosis without current pathological fracture    Constipation    Diverticulosis    Multiple thyroid nodules    Advice given about COVID-19 virus infection    Stiffness of cervical spine    Neck pain, musculoskeletal    Gait instability    Abdominal pain    Hiatal hernia    Fatty liver    Paroxysmal atrial fibrillation    Aortic atherosclerosis    Insomnia    Degenerative disease of nervous system, unspecified    Stage 3a chronic kidney disease    Gross hematuria

## 2025-08-05 ENCOUNTER — PATIENT MESSAGE (OUTPATIENT)
Dept: OTOLARYNGOLOGY | Facility: CLINIC | Age: 85
End: 2025-08-05
Payer: MEDICARE

## 2025-08-05 DIAGNOSIS — R42 DIZZINESS: Primary | ICD-10-CM

## 2025-08-05 LAB — BACTERIA UR CULT: NORMAL

## 2025-08-05 RX ORDER — ONDANSETRON 4 MG/1
TABLET, ORALLY DISINTEGRATING ORAL
Qty: 20 TABLET | Refills: 1 | Status: SHIPPED | OUTPATIENT
Start: 2025-08-05

## 2025-08-07 ENCOUNTER — OFFICE VISIT (OUTPATIENT)
Dept: OBSTETRICS AND GYNECOLOGY | Facility: CLINIC | Age: 85
End: 2025-08-07
Payer: MEDICARE

## 2025-08-07 VITALS
HEIGHT: 63 IN | WEIGHT: 138 LBS | HEART RATE: 72 BPM | SYSTOLIC BLOOD PRESSURE: 137 MMHG | BODY MASS INDEX: 24.45 KG/M2 | DIASTOLIC BLOOD PRESSURE: 76 MMHG

## 2025-08-07 DIAGNOSIS — N94.89 VULVAR BURNING: ICD-10-CM

## 2025-08-07 DIAGNOSIS — N89.8 VAGINAL LESION: ICD-10-CM

## 2025-08-07 DIAGNOSIS — N95.2 VAGINAL ATROPHY: Primary | ICD-10-CM

## 2025-08-07 PROCEDURE — 99999 PR PBB SHADOW E&M-EST. PATIENT-LVL IV: CPT | Mod: PBBFAC,,,

## 2025-08-07 PROCEDURE — 99214 OFFICE O/P EST MOD 30 MIN: CPT | Mod: PBBFAC

## 2025-08-07 RX ORDER — ESTRADIOL 0.1 MG/G
CREAM VAGINAL
Qty: 42.5 G | Refills: 2 | Status: SHIPPED | OUTPATIENT
Start: 2025-08-07 | End: 2026-08-21

## 2025-08-07 NOTE — PROGRESS NOTES
Gynecology Visit  History & Physical      SUBJECTIVE:     History of Present Illness:  Patient is a 85 y.o. female presents with complaints of vulvar burning with possible abrasion palpated on left vaginal sidewall.  Patient reports while applying clobetasol cream with slight pressure noticed Bb sized lump on left sidewall.  Patient reports tenderness noted to area of concern.    Chief Complaint   Patient presents with    Vaginal Atrophy     Pt states she has ongoing burning and possibly has a cut or tear in vaginal area        Review of patient's allergies indicates:   Allergen Reactions    Gabapentin Swelling     Swelling of face    Ciprofloxacin Hives and Dermatitis    Monosodium glutamate Palpitations and Rash    Pantoprazole Nausea And Vomiting       Current Medications[1]  OB History          1    Para   1    Term   1            AB        Living   1         SAB        IAB        Ectopic        Multiple        Live Births                 No LMP recorded. Patient has had a hysterectomy.      Past Medical History:   Diagnosis Date    Atypical ductal hyperplasia, breast     Chronic constipation     Diverticulitis     Diverticulosis     Edema     Hypertension     Mitral valve prolapse     Ovarian cyst      Past Surgical History:   Procedure Laterality Date    ANKLE ARTHROSCOPY W/ OPEN REPAIR Left     APPENDECTOMY  1972    BREAST BIOPSY Left     CHOLECYSTECTOMY      COLONOSCOPY      CYSTOSCOPY N/A 2025    Procedure: CYSTOSCOPY;  Surgeon: Jodi Huang MD;  Location: [facility] OR;  Service: Urology;  Laterality: N/A;    EPIDURAL STEROID INJECTION N/A 2022    Procedure: Injection, Steroid, Epidural NAIDA C7-T1;  Surgeon: Kobe Rodríguez MD;  Location: [facility] OR;  Service: Pain Management;  Laterality: N/A;  Medicare    EPIDURAL STEROID INJECTION INTO CERVICAL SPINE N/A 2022    Procedure: Injection-steroid-epidural-cervical;  Surgeon: Kobe Rodríguez MD;  Location: [facility] OR;  Service:  "Pain Management;  Laterality: N/A;  c7-t1    ESOPHAGOGASTRODUODENOSCOPY N/A 07/02/2024    Procedure: EGD (ESOPHAGOGASTRODUODENOSCOPY);  Surgeon: Indio Galvez MD;  Location: [facility];  Service: Endoscopy;  Laterality: N/A;    EYE SURGERY  2012    HIP SURGERY Right     4 screws in hip    HYSTERECTOMY      INJECTION OF NERVE Bilateral 09/07/2022    Procedure: INJECTION, NERVE;  Surgeon: Kobe Rodríguez MD;  Location: [facility] OR;  Service: Anesthesiology;  Laterality: Bilateral;  C2,3 Mbb and TON block    INJECTION OF NERVE Bilateral 10/12/2022    Procedure: INJECTION, NERVE;  Surgeon: Kobe Rodríguez MD;  Location: [facility] OR;  Service: Anesthesiology;  Laterality: Bilateral;  C2, C3 and TON  MBB    KNEE SURGERY      RADIOFREQUENCY ABLATION Bilateral 11/09/2022    Procedure: Radiofrequency Ablation C2-C3 and TON;  Surgeon: Kobe Rodríguez MD;  Location: [facility] OR;  Service: Pain Management;  Laterality: Bilateral;    TONSILLECTOMY  1948    TURBT (TRANSURETHRAL RESECTION OF BLADDER TUMOR) N/A 6/27/2025    Procedure: TURBT (TRANSURETHRAL RESECTION OF BLADDER TUMOR);  Surgeon: Jodi Huang MD;  Location: [facility];  Service: Urology;  Laterality: N/A;    UPPER GASTROINTESTINAL ENDOSCOPY       Family History   Problem Relation Name Age of Onset    Anuerysm Father      Colon cancer Mother      Breast cancer Sister Lucinad Mangeot     Breast cancer Maternal Aunt Alyce     Collagen disease Neg Hx      Ovarian cancer Neg Hx      Uterine cancer Neg Hx       Social History[2]     OBJECTIVE:     Vital Signs (Most Recent)  Pulse: 72 (08/07/25 1313)  BP: 137/76 (08/07/25 1313)  5' 3" (1.6 m)  62.6 kg (138 lb)     Physical Exam:  Physical Exam  Vitals and nursing note reviewed. Exam conducted with a chaperone present.   Constitutional:       General: She is awake.   Pulmonary:      Effort: Pulmonary effort is normal.   Genitourinary:     General: Normal vulva.      Exam position: Lithotomy position.      Vagina: Signs of " injury (atrophy present, otherwise unremarkable exam) present.      Comments: Pt attempted to find area of concern during physical exam and was unable to locate  Skin:     General: Skin is warm and dry.   Neurological:      Mental Status: She is alert and oriented to person, place, and time.   Psychiatric:         Attention and Perception: Attention and perception normal.         Mood and Affect: Mood and affect normal.         Speech: Speech normal.         Behavior: Behavior normal. Behavior is cooperative.         Thought Content: Thought content normal.         Cognition and Memory: Cognition normal.         Judgment: Judgment normal.       ASSESSMENT/PLAN:       ICD-10-CM ICD-9-CM   1. Vaginal atrophy  N95.2 627.3   2. Vaginal lesion  N89.8 623.8   3. Vulvar burning  N94.89 625.9       PLAN:  Rx for Estrace cream sent to patient pharmacy with instructions to apply pea size amount externally to vulva nightly x2 weeks, decrease to 2-3 times per week after initial dosing  May discontinue clobetasol cream if symptoms are managed with Estrace  Follow up in 1 year for annual exam or sooner as needed    Thank you for allowing me to participate in your care today. It is an honor to be a part of your healthcare team at [facility]. If you have any questions or concerns regarding your visit today, please do not hesitate to contact us.    Thea Julian, [facility]  Gynecology    [address] [place] [address] [phone]          [1]   Current Outpatient Medications   Medication Sig Dispense Refill    azelastine (OPTIVAR) 0.05 % ophthalmic solution Place 1 drop into both eyes 2 (two) times daily. 6 mL 11    clobetasoL (TEMOVATE) 0.05 % cream Apply topically every evening. 6-12 weeks 60 g 2    hydroCHLOROthiazide (HYDRODIURIL) 25 MG tablet Take 1 tablet (25 mg total) by mouth once daily. 90 tablet 3    hyoscyamine (ANASPAZ,LEVSIN) 0.125 mg Tab Take 1 tablet (125 mcg total) by mouth every 4 (four) hours as needed.  30 tablet 0    ibuprofen (ADVIL,MOTRIN) 200 MG tablet Take 400 mg by mouth every 6 (six) hours as needed for Pain.      NEXLETOL 180 mg Tab Take 1 tablet by mouth.      omeprazole (PRILOSEC) 40 MG capsule Take 1 capsule (40 mg total) by mouth 2 (two) times daily before meals. 180 capsule 3    ondansetron (ZOFRAN-ODT) 4 MG TbDL PLACE ONE TABLET ON TONGUE AND ALLOW TO DISINTEGRATE EVERY 6 HOURS AS NEEDED FOR NAUSEA *THANK YOU!* 25 tablet 3    ondansetron (ZOFRAN-ODT) 4 MG TbDL Take 1 every 4 hours as needed for nausea 20 tablet 1    prednisoLONE acetate (PRED FORTE) 1 % DrpS SHAKE WELL AND PLACE 1 DROP IN EACH EYE THREE TIMES DAILY FOR FOUR DAYS. THANK YOU!      traZODone (DESYREL) 50 MG tablet Take 1-2 tablets ( mg total) by mouth nightly as needed for Insomnia. 60 tablet 11    zolpidem (AMBIEN) 5 MG Tab Take 1 tablet (5 mg total) by mouth nightly as needed (insomnia). (Patient taking differently: Take 5 mg by mouth nightly as needed (insomnia). Takes 2.5mg, cutting in half. PRN) 30 tablet 0    ASHWAGANDHA ROOT EXTRACT ORAL Take by mouth. (Patient not taking: Reported on 2025)      estradioL (ESTRACE) 0.01 % (0.1 mg/gram) vaginal cream Place 1 g vaginally every evening for 14 days, THEN 1 g twice a week. 42.5 g 2    multivitamin (THERAGRAN) per tablet Take 1 tablet by mouth once daily. (Patient not taking: Reported on 2025)      TURMERIC ORAL Take 1 tablet by mouth 2 (two) times a day. (Patient not taking: Reported on 2025)      vitamin E 600 UNIT capsule Take 600 Units by mouth once daily. (Patient not taking: Reported on 2025)       No current facility-administered medications for this visit.   [2]   Social History  Tobacco Use    Smoking status: Former     Current packs/day: 0.00     Average packs/day: 2.0 packs/day for 25.9 years (51.8 ttl pk-yrs)     Types: Cigarettes     Start date: 1960     Quit date: 1985     Years since quittin.7     Passive exposure: Never    Smokeless  tobacco: Never   Substance Use Topics    Alcohol use: Yes     Alcohol/week: 5.0 standard drinks of alcohol     Types: 4 Glasses of wine, 1 Shots of liquor per week    Drug use: No

## 2025-08-25 ENCOUNTER — PATIENT MESSAGE (OUTPATIENT)
Dept: OTOLARYNGOLOGY | Facility: CLINIC | Age: 85
End: 2025-08-25
Payer: MEDICARE

## 2025-08-26 RX ORDER — FLUTICASONE PROPIONATE 50 MCG
2 SPRAY, SUSPENSION (ML) NASAL NIGHTLY
Qty: 15.8 ML | Refills: 11 | Status: SHIPPED | OUTPATIENT
Start: 2025-08-26 | End: 2026-08-26

## (undated) DEVICE — CANNULA SUPERSOFT CO2 7FT

## (undated) DEVICE — GLOVE SURG ULTRA TOUCH 7.5

## (undated) DEVICE — NDL HYPODERMIC BLUNT 18G 1.5IN

## (undated) DEVICE — TRAY NERVE BLOCK

## (undated) DEVICE — NDL 10CC 20GAX1 COMBO

## (undated) DEVICE — APPLICATOR CHLORAPREP CLR 10.5

## (undated) DEVICE — SYR LUER SLIP GLASS 5ML

## (undated) DEVICE — EVACUATOR BLADDER UROVAC ADPT

## (undated) DEVICE — GLOVE SENSICARE PI ALOE 6.5

## (undated) DEVICE — BAG LINGEMAN DRAIN UROLOGY

## (undated) DEVICE — NDL SAFETY 25G X 1.5 ECLIPSE

## (undated) DEVICE — SPONGE BULKEE II ABSRB 6X6.75

## (undated) DEVICE — NDL SPINAL 25GX3.5 SPINOCAN

## (undated) DEVICE — COVER TABLE 44X90 STERILE

## (undated) DEVICE — GLOVE SENSICARE PI SURG 6.5

## (undated) DEVICE — BOWL STERILE LARGE 32OZ

## (undated) DEVICE — SYR LUER-LOCK STERILE 3ML

## (undated) DEVICE — MARKER SKIN STND TIP BLUE BARR

## (undated) DEVICE — STRAP OR TABLE 5IN X 72IN

## (undated) DEVICE — SYR LUER-LOCK STERILE 5ML

## (undated) DEVICE — KIT NERVE BLOCK PREP BAPTIST

## (undated) DEVICE — NDL TUOHY EPIDURAL 20G X 3.5

## (undated) DEVICE — SYR 10CC LUER LOCK

## (undated) DEVICE — SYS LABEL CORRECT MED

## (undated) DEVICE — LOOP CUTTING RESECT 12 D 24F

## (undated) DEVICE — SOL NACL IRR 1000ML BTL

## (undated) DEVICE — PAD GROUNDING DISPER ELECTRODE

## (undated) DEVICE — DRAPE LEGGINGS CUFF 33X51IN

## (undated) DEVICE — SCRUB DYNA-HEX LIQ 4% CHG 4OZ

## (undated) DEVICE — DEVICE SNAPSECURE FOL CATH

## (undated) DEVICE — SCRUB HIBICLENS 4% CHG 4OZ

## (undated) DEVICE — CANNULA ETCO2 ADULT 7 O2/CO2

## (undated) DEVICE — GOWN POLY REINF BRTH SLV LG

## (undated) DEVICE — TUBING SUC UNIV W/CONN 12FT

## (undated) DEVICE — GLOVE SENSICARE PI SURG 7

## (undated) DEVICE — SET CYSTO IRR DRP CHMBR 84IN

## (undated) DEVICE — Device

## (undated) DEVICE — JELLY SURGILUBE LUBE TUBE 2OZ

## (undated) DEVICE — TOWEL OR DISP STRL BLUE 4/PK

## (undated) DEVICE — PAD PREPS ALCOHOL 2-PLY LARGE

## (undated) DEVICE — TUBING ARTHRO IRR 4-LEAD

## (undated) DEVICE — SPONGE COTTON TRAY 4X4IN

## (undated) DEVICE — SLEEVE SCD EXPRESS KNEE MEDIUM

## (undated) DEVICE — CONTAINER SPECIMEN OR STER 4OZ

## (undated) DEVICE — DRAPE CYSTOSCOPY LARGE

## (undated) DEVICE — CANNULA RADIOPAQUE 20G CURVED

## (undated) DEVICE — NDL TUOHY EPIDRL YLW 20G 3.5IN

## (undated) DEVICE — DRAPE THREE-QUARTER 53X77IN

## (undated) DEVICE — BAG DRAIN ANTI REFLUX 2000ML

## (undated) DEVICE — SYR 50ML CATH TIP

## (undated) DEVICE — SOL NACL IRR 3000ML